# Patient Record
Sex: MALE | Race: WHITE | NOT HISPANIC OR LATINO | Employment: UNEMPLOYED | URBAN - METROPOLITAN AREA
[De-identification: names, ages, dates, MRNs, and addresses within clinical notes are randomized per-mention and may not be internally consistent; named-entity substitution may affect disease eponyms.]

---

## 2020-01-14 ENCOUNTER — HOSPITAL ENCOUNTER (INPATIENT)
Facility: HOSPITAL | Age: 30
LOS: 4 days | Discharge: HOME | End: 2020-01-18
Attending: EMERGENCY MEDICINE | Admitting: INTERNAL MEDICINE

## 2020-01-14 ENCOUNTER — APPOINTMENT (OUTPATIENT)
Dept: GENERAL RADIOLOGY | Facility: HOSPITAL | Age: 30
End: 2020-01-14

## 2020-01-14 ENCOUNTER — APPOINTMENT (OUTPATIENT)
Dept: CT IMAGING | Facility: HOSPITAL | Age: 30
End: 2020-01-14

## 2020-01-14 DIAGNOSIS — F10.10 ALCOHOL ABUSE: ICD-10-CM

## 2020-01-14 DIAGNOSIS — R11.2 NON-INTRACTABLE VOMITING WITH NAUSEA, UNSPECIFIED VOMITING TYPE: ICD-10-CM

## 2020-01-14 DIAGNOSIS — R79.89 ELEVATED LFTS: ICD-10-CM

## 2020-01-14 DIAGNOSIS — K85.20 ALCOHOL-INDUCED ACUTE PANCREATITIS WITHOUT INFECTION OR NECROSIS: Primary | ICD-10-CM

## 2020-01-14 DIAGNOSIS — E87.6 HYPOKALEMIA: ICD-10-CM

## 2020-01-14 PROBLEM — K85.90 PANCREATITIS: Status: ACTIVE | Noted: 2020-01-14

## 2020-01-14 LAB
ALBUMIN SERPL-MCNC: 4.5 G/DL (ref 3.5–5.2)
ALBUMIN/GLOB SERPL: 1.3 G/DL
ALP SERPL-CCNC: 124 U/L (ref 39–117)
ALT SERPL W P-5'-P-CCNC: 120 U/L (ref 1–41)
ANION GAP SERPL CALCULATED.3IONS-SCNC: 19 MMOL/L (ref 5–15)
AST SERPL-CCNC: 211 U/L (ref 1–40)
BASOPHILS # BLD AUTO: 0.06 10*3/MM3 (ref 0–0.2)
BASOPHILS NFR BLD AUTO: 0.7 % (ref 0–1.5)
BILIRUB SERPL-MCNC: 1 MG/DL (ref 0.2–1.2)
BILIRUB UR QL STRIP: NEGATIVE
BUN BLD-MCNC: 4 MG/DL (ref 6–20)
BUN/CREAT SERPL: 6.3 (ref 7–25)
CALCIUM SPEC-SCNC: 9.4 MG/DL (ref 8.6–10.5)
CHLORIDE SERPL-SCNC: 95 MMOL/L (ref 98–107)
CLARITY UR: CLEAR
CO2 SERPL-SCNC: 24 MMOL/L (ref 22–29)
COLOR UR: YELLOW
CREAT BLD-MCNC: 0.63 MG/DL (ref 0.76–1.27)
DEPRECATED RDW RBC AUTO: 43.3 FL (ref 37–54)
EOSINOPHIL # BLD AUTO: 0.13 10*3/MM3 (ref 0–0.4)
EOSINOPHIL NFR BLD AUTO: 1.4 % (ref 0.3–6.2)
ERYTHROCYTE [DISTWIDTH] IN BLOOD BY AUTOMATED COUNT: 12.1 % (ref 12.3–15.4)
GFR SERPL CREATININE-BSD FRML MDRD: 149 ML/MIN/1.73
GLOBULIN UR ELPH-MCNC: 3.4 GM/DL
GLUCOSE BLD-MCNC: 125 MG/DL (ref 65–99)
GLUCOSE UR STRIP-MCNC: NEGATIVE MG/DL
HCT VFR BLD AUTO: 48.4 % (ref 37.5–51)
HGB BLD-MCNC: 16.9 G/DL (ref 13–17.7)
HGB UR QL STRIP.AUTO: NEGATIVE
HOLD SPECIMEN: NORMAL
HOLD SPECIMEN: NORMAL
IMM GRANULOCYTES # BLD AUTO: 0.02 10*3/MM3 (ref 0–0.05)
IMM GRANULOCYTES NFR BLD AUTO: 0.2 % (ref 0–0.5)
KETONES UR QL STRIP: ABNORMAL
LEUKOCYTE ESTERASE UR QL STRIP.AUTO: NEGATIVE
LIPASE SERPL-CCNC: 785 U/L (ref 13–60)
LYMPHOCYTES # BLD AUTO: 1.93 10*3/MM3 (ref 0.7–3.1)
LYMPHOCYTES NFR BLD AUTO: 21.4 % (ref 19.6–45.3)
MCH RBC QN AUTO: 33.9 PG (ref 26.6–33)
MCHC RBC AUTO-ENTMCNC: 34.9 G/DL (ref 31.5–35.7)
MCV RBC AUTO: 97.2 FL (ref 79–97)
MONOCYTES # BLD AUTO: 1.21 10*3/MM3 (ref 0.1–0.9)
MONOCYTES NFR BLD AUTO: 13.4 % (ref 5–12)
NEUTROPHILS # BLD AUTO: 5.68 10*3/MM3 (ref 1.7–7)
NEUTROPHILS NFR BLD AUTO: 62.9 % (ref 42.7–76)
NITRITE UR QL STRIP: NEGATIVE
NRBC BLD AUTO-RTO: 0 /100 WBC (ref 0–0.2)
PH UR STRIP.AUTO: 6 [PH] (ref 5–8)
PLATELET # BLD AUTO: 125 10*3/MM3 (ref 140–450)
PMV BLD AUTO: 11.4 FL (ref 6–12)
POTASSIUM BLD-SCNC: 2.9 MMOL/L (ref 3.5–5.2)
PROT SERPL-MCNC: 7.9 G/DL (ref 6–8.5)
PROT UR QL STRIP: ABNORMAL
RBC # BLD AUTO: 4.98 10*6/MM3 (ref 4.14–5.8)
SODIUM BLD-SCNC: 138 MMOL/L (ref 136–145)
SP GR UR STRIP: 1.01 (ref 1–1.03)
UROBILINOGEN UR QL STRIP: ABNORMAL
WBC NRBC COR # BLD: 9.03 10*3/MM3 (ref 3.4–10.8)
WHOLE BLOOD HOLD SPECIMEN: NORMAL
WHOLE BLOOD HOLD SPECIMEN: NORMAL

## 2020-01-14 PROCEDURE — 81003 URINALYSIS AUTO W/O SCOPE: CPT | Performed by: EMERGENCY MEDICINE

## 2020-01-14 PROCEDURE — 84100 ASSAY OF PHOSPHORUS: CPT | Performed by: NURSE PRACTITIONER

## 2020-01-14 PROCEDURE — 83690 ASSAY OF LIPASE: CPT

## 2020-01-14 PROCEDURE — 25010000002 IOPAMIDOL 61 % SOLUTION: Performed by: EMERGENCY MEDICINE

## 2020-01-14 PROCEDURE — 80053 COMPREHEN METABOLIC PANEL: CPT

## 2020-01-14 PROCEDURE — 74177 CT ABD & PELVIS W/CONTRAST: CPT

## 2020-01-14 PROCEDURE — 25010000002 HYDROMORPHONE PER 4 MG: Performed by: EMERGENCY MEDICINE

## 2020-01-14 PROCEDURE — 80306 DRUG TEST PRSMV INSTRMNT: CPT | Performed by: NURSE PRACTITIONER

## 2020-01-14 PROCEDURE — 71045 X-RAY EXAM CHEST 1 VIEW: CPT

## 2020-01-14 PROCEDURE — 85025 COMPLETE CBC W/AUTO DIFF WBC: CPT

## 2020-01-14 PROCEDURE — 83735 ASSAY OF MAGNESIUM: CPT | Performed by: NURSE PRACTITIONER

## 2020-01-14 PROCEDURE — 80061 LIPID PANEL: CPT | Performed by: INTERNAL MEDICINE

## 2020-01-14 PROCEDURE — 99284 EMERGENCY DEPT VISIT MOD MDM: CPT

## 2020-01-14 PROCEDURE — 25010000003 POTASSIUM CHLORIDE 10 MEQ/100ML SOLUTION: Performed by: PHYSICIAN ASSISTANT

## 2020-01-14 PROCEDURE — 25010000002 ONDANSETRON PER 1 MG: Performed by: EMERGENCY MEDICINE

## 2020-01-14 RX ORDER — SODIUM CHLORIDE, SODIUM LACTATE, POTASSIUM CHLORIDE, CALCIUM CHLORIDE 600; 310; 30; 20 MG/100ML; MG/100ML; MG/100ML; MG/100ML
999 INJECTION, SOLUTION INTRAVENOUS CONTINUOUS
Status: ACTIVE | OUTPATIENT
Start: 2020-01-14 | End: 2020-01-15

## 2020-01-14 RX ORDER — ONDANSETRON 2 MG/ML
4 INJECTION INTRAMUSCULAR; INTRAVENOUS ONCE
Status: COMPLETED | OUTPATIENT
Start: 2020-01-14 | End: 2020-01-14

## 2020-01-14 RX ORDER — HYDROMORPHONE HYDROCHLORIDE 1 MG/ML
0.5 INJECTION, SOLUTION INTRAMUSCULAR; INTRAVENOUS; SUBCUTANEOUS ONCE
Status: COMPLETED | OUTPATIENT
Start: 2020-01-14 | End: 2020-01-14

## 2020-01-14 RX ORDER — SODIUM CHLORIDE 0.9 % (FLUSH) 0.9 %
10 SYRINGE (ML) INJECTION AS NEEDED
Status: DISCONTINUED | OUTPATIENT
Start: 2020-01-14 | End: 2020-01-18 | Stop reason: HOSPADM

## 2020-01-14 RX ORDER — SODIUM CHLORIDE, SODIUM LACTATE, POTASSIUM CHLORIDE, CALCIUM CHLORIDE 600; 310; 30; 20 MG/100ML; MG/100ML; MG/100ML; MG/100ML
250 INJECTION, SOLUTION INTRAVENOUS CONTINUOUS
Status: DISCONTINUED | OUTPATIENT
Start: 2020-01-14 | End: 2020-01-15

## 2020-01-14 RX ORDER — POTASSIUM CHLORIDE 7.45 MG/ML
10 INJECTION INTRAVENOUS
Status: DISPENSED | OUTPATIENT
Start: 2020-01-14 | End: 2020-01-15

## 2020-01-14 RX ADMIN — HYDROMORPHONE HYDROCHLORIDE 0.5 MG: 1 INJECTION, SOLUTION INTRAMUSCULAR; INTRAVENOUS; SUBCUTANEOUS at 22:55

## 2020-01-14 RX ADMIN — SODIUM CHLORIDE 1000 ML: 9 INJECTION, SOLUTION INTRAVENOUS at 22:52

## 2020-01-14 RX ADMIN — IOPAMIDOL 90 ML: 612 INJECTION, SOLUTION INTRAVENOUS at 23:12

## 2020-01-14 RX ADMIN — POTASSIUM CHLORIDE 10 MEQ: 7.46 INJECTION, SOLUTION INTRAVENOUS at 22:56

## 2020-01-14 RX ADMIN — ONDANSETRON 4 MG: 2 INJECTION INTRAMUSCULAR; INTRAVENOUS at 22:55

## 2020-01-15 PROBLEM — R79.89 ELEVATED LFTS: Status: ACTIVE | Noted: 2020-01-15

## 2020-01-15 PROBLEM — IMO0001 ALCOHOLISM /ALCOHOL ABUSE: Status: ACTIVE | Noted: 2020-01-15

## 2020-01-15 PROBLEM — E87.6 HYPOKALEMIA: Status: ACTIVE | Noted: 2020-01-15

## 2020-01-15 LAB
AMPHET+METHAMPHET UR QL: NEGATIVE
AMPHETAMINES UR QL: NEGATIVE
BARBITURATES UR QL SCN: NEGATIVE
BENZODIAZ UR QL SCN: POSITIVE
BUPRENORPHINE SERPL-MCNC: POSITIVE NG/ML
CANNABINOIDS SERPL QL: NEGATIVE
CHOLEST SERPL-MCNC: 156 MG/DL (ref 0–200)
COCAINE UR QL: NEGATIVE
HDLC SERPL-MCNC: 90 MG/DL (ref 40–60)
LDLC SERPL CALC-MCNC: 45 MG/DL (ref 0–100)
LDLC/HDLC SERPL: 0.5 {RATIO}
MAGNESIUM SERPL-MCNC: 1.8 MG/DL (ref 1.6–2.6)
MAGNESIUM SERPL-MCNC: 2.3 MG/DL (ref 1.6–2.6)
METHADONE UR QL SCN: NEGATIVE
OPIATES UR QL: NEGATIVE
OXYCODONE UR QL SCN: NEGATIVE
PCP UR QL SCN: NEGATIVE
PHOSPHATE SERPL-MCNC: 2.4 MG/DL (ref 2.5–4.5)
POTASSIUM BLD-SCNC: 3.5 MMOL/L (ref 3.5–5.2)
PROPOXYPH UR QL: NEGATIVE
TRICYCLICS UR QL SCN: NEGATIVE
TRIGL SERPL-MCNC: 107 MG/DL (ref 0–150)
TSH SERPL DL<=0.05 MIU/L-ACNC: 4.47 UIU/ML (ref 0.27–4.2)
VLDLC SERPL-MCNC: 21.4 MG/DL

## 2020-01-15 PROCEDURE — 25010000002 LORAZEPAM PER 2 MG: Performed by: NURSE PRACTITIONER

## 2020-01-15 PROCEDURE — 83735 ASSAY OF MAGNESIUM: CPT | Performed by: INTERNAL MEDICINE

## 2020-01-15 PROCEDURE — 99223 1ST HOSP IP/OBS HIGH 75: CPT | Performed by: INTERNAL MEDICINE

## 2020-01-15 PROCEDURE — 25010000002 HYDROMORPHONE PER 4 MG: Performed by: INTERNAL MEDICINE

## 2020-01-15 PROCEDURE — 25010000002 THIAMINE PER 100 MG: Performed by: NURSE PRACTITIONER

## 2020-01-15 PROCEDURE — 84443 ASSAY THYROID STIM HORMONE: CPT | Performed by: NURSE PRACTITIONER

## 2020-01-15 PROCEDURE — 25010000003 POTASSIUM CHLORIDE 10 MEQ/100ML SOLUTION: Performed by: PHYSICIAN ASSISTANT

## 2020-01-15 PROCEDURE — 25010000003 POTASSIUM CHLORIDE 10 MEQ/100ML SOLUTION: Performed by: NURSE PRACTITIONER

## 2020-01-15 PROCEDURE — 25010000002 ONDANSETRON PER 1 MG: Performed by: NURSE PRACTITIONER

## 2020-01-15 PROCEDURE — 84132 ASSAY OF SERUM POTASSIUM: CPT | Performed by: INTERNAL MEDICINE

## 2020-01-15 PROCEDURE — 25010000002 ENOXAPARIN PER 10 MG: Performed by: NURSE PRACTITIONER

## 2020-01-15 RX ORDER — ONDANSETRON 2 MG/ML
4 INJECTION INTRAMUSCULAR; INTRAVENOUS EVERY 6 HOURS PRN
Status: DISCONTINUED | OUTPATIENT
Start: 2020-01-15 | End: 2020-01-18 | Stop reason: HOSPADM

## 2020-01-15 RX ORDER — OXYCODONE AND ACETAMINOPHEN 7.5; 325 MG/1; MG/1
1 TABLET ORAL EVERY 4 HOURS PRN
Status: DISCONTINUED | OUTPATIENT
Start: 2020-01-15 | End: 2020-01-18 | Stop reason: HOSPADM

## 2020-01-15 RX ORDER — SODIUM CHLORIDE 0.9 % (FLUSH) 0.9 %
10 SYRINGE (ML) INJECTION EVERY 12 HOURS SCHEDULED
Status: DISCONTINUED | OUTPATIENT
Start: 2020-01-15 | End: 2020-01-18 | Stop reason: HOSPADM

## 2020-01-15 RX ORDER — MAGNESIUM SULFATE HEPTAHYDRATE 40 MG/ML
2 INJECTION, SOLUTION INTRAVENOUS AS NEEDED
Status: DISCONTINUED | OUTPATIENT
Start: 2020-01-15 | End: 2020-01-18 | Stop reason: HOSPADM

## 2020-01-15 RX ORDER — LORAZEPAM 2 MG/ML
2 INJECTION INTRAMUSCULAR
Status: DISCONTINUED | OUTPATIENT
Start: 2020-01-15 | End: 2020-01-16

## 2020-01-15 RX ORDER — SODIUM CHLORIDE 0.9 % (FLUSH) 0.9 %
10 SYRINGE (ML) INJECTION AS NEEDED
Status: DISCONTINUED | OUTPATIENT
Start: 2020-01-15 | End: 2020-01-18 | Stop reason: HOSPADM

## 2020-01-15 RX ORDER — LORAZEPAM 1 MG/1
1 TABLET ORAL
Status: DISCONTINUED | OUTPATIENT
Start: 2020-01-15 | End: 2020-01-16

## 2020-01-15 RX ORDER — HYDROMORPHONE HYDROCHLORIDE 1 MG/ML
0.5 INJECTION, SOLUTION INTRAMUSCULAR; INTRAVENOUS; SUBCUTANEOUS
Status: DISCONTINUED | OUTPATIENT
Start: 2020-01-15 | End: 2020-01-18 | Stop reason: HOSPADM

## 2020-01-15 RX ORDER — POTASSIUM CHLORIDE 1.5 G/1.77G
40 POWDER, FOR SOLUTION ORAL AS NEEDED
Status: DISCONTINUED | OUTPATIENT
Start: 2020-01-15 | End: 2020-01-18 | Stop reason: HOSPADM

## 2020-01-15 RX ORDER — MAGNESIUM SULFATE HEPTAHYDRATE 40 MG/ML
4 INJECTION, SOLUTION INTRAVENOUS AS NEEDED
Status: DISCONTINUED | OUTPATIENT
Start: 2020-01-15 | End: 2020-01-18 | Stop reason: HOSPADM

## 2020-01-15 RX ORDER — POTASSIUM CHLORIDE 7.45 MG/ML
10 INJECTION INTRAVENOUS
Status: DISCONTINUED | OUTPATIENT
Start: 2020-01-15 | End: 2020-01-18 | Stop reason: HOSPADM

## 2020-01-15 RX ORDER — LORAZEPAM 1 MG/1
2 TABLET ORAL
Status: DISCONTINUED | OUTPATIENT
Start: 2020-01-15 | End: 2020-01-16

## 2020-01-15 RX ORDER — BUPRENORPHINE HYDROCHLORIDE AND NALOXONE HYDROCHLORIDE DIHYDRATE 8; 2 MG/1; MG/1
1 TABLET SUBLINGUAL DAILY
COMMUNITY
End: 2020-01-18 | Stop reason: HOSPADM

## 2020-01-15 RX ORDER — BUSPIRONE HYDROCHLORIDE 10 MG/1
10 TABLET ORAL 2 TIMES DAILY
Status: DISCONTINUED | OUTPATIENT
Start: 2020-01-15 | End: 2020-01-18 | Stop reason: HOSPADM

## 2020-01-15 RX ORDER — SODIUM CHLORIDE 9 MG/ML
75 INJECTION, SOLUTION INTRAVENOUS CONTINUOUS
Status: DISCONTINUED | OUTPATIENT
Start: 2020-01-15 | End: 2020-01-18 | Stop reason: HOSPADM

## 2020-01-15 RX ORDER — ONDANSETRON 4 MG/1
4 TABLET, FILM COATED ORAL EVERY 6 HOURS PRN
Status: DISCONTINUED | OUTPATIENT
Start: 2020-01-15 | End: 2020-01-18 | Stop reason: HOSPADM

## 2020-01-15 RX ORDER — NICOTINE 21 MG/24HR
PATCH, TRANSDERMAL 24 HOURS TRANSDERMAL
Status: COMPLETED
Start: 2020-01-15 | End: 2020-01-15

## 2020-01-15 RX ORDER — NICOTINE 21 MG/24HR
1 PATCH, TRANSDERMAL 24 HOURS TRANSDERMAL
Status: DISCONTINUED | OUTPATIENT
Start: 2020-01-16 | End: 2020-01-15

## 2020-01-15 RX ORDER — NICOTINE 21 MG/24HR
1 PATCH, TRANSDERMAL 24 HOURS TRANSDERMAL
Status: DISCONTINUED | OUTPATIENT
Start: 2020-01-15 | End: 2020-01-18 | Stop reason: HOSPADM

## 2020-01-15 RX ORDER — POTASSIUM CHLORIDE 750 MG/1
40 CAPSULE, EXTENDED RELEASE ORAL AS NEEDED
Status: DISCONTINUED | OUTPATIENT
Start: 2020-01-15 | End: 2020-01-18 | Stop reason: HOSPADM

## 2020-01-15 RX ORDER — LORAZEPAM 2 MG/ML
1 INJECTION INTRAMUSCULAR
Status: DISCONTINUED | OUTPATIENT
Start: 2020-01-15 | End: 2020-01-16

## 2020-01-15 RX ADMIN — LORAZEPAM 2 MG: 1 TABLET ORAL at 19:41

## 2020-01-15 RX ADMIN — HYDROMORPHONE HYDROCHLORIDE 0.5 MG: 1 INJECTION, SOLUTION INTRAMUSCULAR; INTRAVENOUS; SUBCUTANEOUS at 19:36

## 2020-01-15 RX ADMIN — HYDROMORPHONE HYDROCHLORIDE 0.5 MG: 1 INJECTION, SOLUTION INTRAMUSCULAR; INTRAVENOUS; SUBCUTANEOUS at 16:05

## 2020-01-15 RX ADMIN — POTASSIUM CHLORIDE 10 MEQ: 7.46 INJECTION, SOLUTION INTRAVENOUS at 04:45

## 2020-01-15 RX ADMIN — LORAZEPAM 2 MG: 1 TABLET ORAL at 09:09

## 2020-01-15 RX ADMIN — ENOXAPARIN SODIUM 40 MG: 40 INJECTION SUBCUTANEOUS at 06:11

## 2020-01-15 RX ADMIN — ONDANSETRON 4 MG: 2 INJECTION INTRAMUSCULAR; INTRAVENOUS at 09:09

## 2020-01-15 RX ADMIN — MAGNESIUM SULFATE HEPTAHYDRATE 4 G: 40 INJECTION, SOLUTION INTRAVENOUS at 02:55

## 2020-01-15 RX ADMIN — LORAZEPAM 1 MG: 2 INJECTION INTRAMUSCULAR; INTRAVENOUS at 20:41

## 2020-01-15 RX ADMIN — SODIUM CHLORIDE, PRESERVATIVE FREE 10 ML: 5 INJECTION INTRAVENOUS at 08:17

## 2020-01-15 RX ADMIN — LORAZEPAM 1 MG: 2 INJECTION INTRAMUSCULAR; INTRAVENOUS at 04:52

## 2020-01-15 RX ADMIN — HYDROMORPHONE HYDROCHLORIDE 0.5 MG: 1 INJECTION, SOLUTION INTRAMUSCULAR; INTRAVENOUS; SUBCUTANEOUS at 09:09

## 2020-01-15 RX ADMIN — BUSPIRONE HYDROCHLORIDE 10 MG: 10 TABLET ORAL at 20:41

## 2020-01-15 RX ADMIN — POTASSIUM CHLORIDE 10 MEQ: 7.46 INJECTION, SOLUTION INTRAVENOUS at 07:00

## 2020-01-15 RX ADMIN — LORAZEPAM 1 MG: 2 INJECTION INTRAMUSCULAR; INTRAVENOUS at 02:44

## 2020-01-15 RX ADMIN — BUSPIRONE HYDROCHLORIDE 10 MG: 10 TABLET ORAL at 12:34

## 2020-01-15 RX ADMIN — POTASSIUM CHLORIDE 10 MEQ: 7.46 INJECTION, SOLUTION INTRAVENOUS at 05:54

## 2020-01-15 RX ADMIN — NICOTINE 1 PATCH: 21 PATCH, EXTENDED RELEASE TRANSDERMAL at 20:44

## 2020-01-15 RX ADMIN — SODIUM CHLORIDE, PRESERVATIVE FREE 10 ML: 5 INJECTION INTRAVENOUS at 02:55

## 2020-01-15 RX ADMIN — SODIUM CHLORIDE 150 ML/HR: 9 INJECTION, SOLUTION INTRAVENOUS at 02:55

## 2020-01-15 RX ADMIN — OXYCODONE HYDROCHLORIDE AND ACETAMINOPHEN 1 TABLET: 7.5; 325 TABLET ORAL at 12:33

## 2020-01-15 RX ADMIN — POTASSIUM CHLORIDE 10 MEQ: 7.46 INJECTION, SOLUTION INTRAVENOUS at 01:01

## 2020-01-15 RX ADMIN — HYDROMORPHONE HYDROCHLORIDE 0.5 MG: 1 INJECTION, SOLUTION INTRAMUSCULAR; INTRAVENOUS; SUBCUTANEOUS at 03:19

## 2020-01-15 RX ADMIN — SODIUM CHLORIDE, PRESERVATIVE FREE 10 ML: 5 INJECTION INTRAVENOUS at 20:41

## 2020-01-15 RX ADMIN — LORAZEPAM 2 MG: 2 INJECTION INTRAMUSCULAR; INTRAVENOUS at 08:17

## 2020-01-15 RX ADMIN — HYDROMORPHONE HYDROCHLORIDE 0.5 MG: 1 INJECTION, SOLUTION INTRAMUSCULAR; INTRAVENOUS; SUBCUTANEOUS at 06:10

## 2020-01-15 RX ADMIN — POTASSIUM CHLORIDE 10 MEQ: 7.46 INJECTION, SOLUTION INTRAVENOUS at 03:44

## 2020-01-15 RX ADMIN — LORAZEPAM 2 MG: 1 TABLET ORAL at 16:06

## 2020-01-15 RX ADMIN — NICOTINE: 21 PATCH, EXTENDED RELEASE TRANSDERMAL at 02:44

## 2020-01-15 RX ADMIN — POTASSIUM CHLORIDE 40 MEQ: 750 CAPSULE, EXTENDED RELEASE ORAL at 20:42

## 2020-01-15 RX ADMIN — ONDANSETRON 4 MG: 2 INJECTION INTRAMUSCULAR; INTRAVENOUS at 03:19

## 2020-01-15 RX ADMIN — THIAMINE HYDROCHLORIDE 100 ML/HR: 100 INJECTION, SOLUTION INTRAMUSCULAR; INTRAVENOUS at 08:17

## 2020-01-15 RX ADMIN — OXYCODONE HYDROCHLORIDE AND ACETAMINOPHEN 1 TABLET: 7.5; 325 TABLET ORAL at 17:11

## 2020-01-15 RX ADMIN — POTASSIUM CHLORIDE 40 MEQ: 750 CAPSULE, EXTENDED RELEASE ORAL at 17:11

## 2020-01-15 RX ADMIN — LORAZEPAM 2 MG: 2 INJECTION INTRAMUSCULAR; INTRAVENOUS at 10:53

## 2020-01-15 NOTE — PLAN OF CARE
Problem: Patient Care Overview  Goal: Plan of Care Review  Outcome: Ongoing (interventions implemented as appropriate)  Flowsheets (Taken 1/15/2020 0519)  Progress: no change  Plan of Care Reviewed With: patient; spouse  Outcome Summary: Pt newly admitted to floor tonight. Mg and potassium replaced per protocol. PRN medication given for pain. CIWAS at 8 and ativan given per protocol. Vital signs stable, will continue to monitor.     Problem: Pain, Chronic (Adult)  Goal: Identify Related Risk Factors and Signs and Symptoms  Outcome: Ongoing (interventions implemented as appropriate)  Flowsheets (Taken 1/15/2020 0519)  Related Risk Factors (Chronic Pain): pain control inadequate  Signs and Symptoms (Chronic Pain): verbalization of pain descriptors; verbalization of pain/discomfort for a prolonged time period     Problem: Pain, Chronic (Adult)  Goal: Acceptable Pain/Comfort Level and Functional Ability  Outcome: Ongoing (interventions implemented as appropriate)  Flowsheets (Taken 1/15/2020 0519)  Acceptable Pain/Comfort Level and Functional Ability: making progress toward outcome     Problem: Pancreatitis, Acute/Chronic (Adult)  Goal: Signs and Symptoms of Listed Potential Problems Will be Absent, Minimized or Managed (Pancreatitis, Acute/Chronic)  Outcome: Ongoing (interventions implemented as appropriate)  Flowsheets (Taken 1/15/2020 0519)  Problems Assessed (Pancreatitis): all  Problems Present (Pancreatitis): nausea and vomiting; pain     Problem: Alcohol Withdrawal Acute, Risk/Actual (Adult)  Goal: Signs and Symptoms of Listed Potential Problems Will be Absent, Minimized or Managed (Alcohol Withdrawal Acute, Risk/Actual)  Outcome: Ongoing (interventions implemented as appropriate)  Flowsheets (Taken 1/15/2020 0519)  Problems Assessed (Alcohol Withdrawal Syndrome): all  Problems Present (Alcohol W/D Syndrome): situational response

## 2020-01-15 NOTE — PROGRESS NOTES
Discharge Planning Assessment  [facility]     Patient Name: Eusebio Holley  MRN: [id number]  Today's Date: 1/15/2020    Admit Date: 1/14/2020    Discharge Needs Assessment     Row Name 01/15/20 1054       Living Environment    Lives With  spouse    Name(s) of Who Lives With Patient  Angélica Sher    Current Living Arrangements  home/apartment/condo    Primary Care Provided by  self    Provides Primary Care For  no one    Family Caregiver if Needed  spouse;parent(s)    Family Caregiver Names  Angélica Sher or mother    Quality of Family Relationships  involved mother    Able to Return to Prior Arrangements  yes possible rehab for chemical dependency       Resource/Environmental Concerns    Resource/Environmental Concerns  none       Transition Planning    Patient/Family Anticipates Transition to  home with family    Transportation Anticipated  family or friend will provide       Discharge Needs Assessment    Readmission Within the Last 30 Days  no previous admission in last 30 days    Concerns to be Addressed  no discharge needs identified;denies needs/concerns at this time    Equipment Currently Used at Home  none    Anticipated Changes Related to Illness  none    Equipment Needed After Discharge  none    Discharge Facility/Level of Care Needs  substance abuse facility    Provided post acute provider list?  Refused    Current Discharge Risk  substance use/abuse    Discharge Coordination/Progress  Home vs rehab for substance abuse        Discharge Plan     Row Name 01/15/20 1057       Plan    Plan  Home vs rehab for substance abuse    Patient/Family in Agreement with Plan  yes    Plan Comments  Spoke with patient at bedside regarding discharge planning, mother at bedside.  Patient denies use or need for HH or DME and reports that he has prescription coverage.  Patient reports he lives wiht he wife, Angélica, in a single level house with 3 steps to access and denies concerns regarding home safety.  Discussed with  "patient that the physican has placed an order for Chemical Dependency Team to see him and that they will be by today, patient mother verbalizes, \"HE NEEDS REHAB\".  No discharge needs verbalized at this time.  CM following.  Patient plan is either to discharge home or to rehab faciliyt for substance abuse.      Final Discharge Disposition Code  01 - home or self-care;65 - psychiatric hospital or unit    Row Name 01/15/20 0929       Plan    Plan  update    Plan Comments  Per CM consult, called Fabi, with Chemical Dependency Team to advise and left voicemail with request to see.  CM following.      Final Discharge Disposition Code  30 - still a patient        Destination      Coordination has not been started for this encounter.      Durable Medical Equipment      Coordination has not been started for this encounter.      Dialysis/Infusion      Coordination has not been started for this encounter.      Home Medical Care      Coordination has not been started for this encounter.      Therapy      Coordination has not been started for this encounter.      Community Resources      Coordination has not been started for this encounter.        Expected Discharge Date and Time     Expected Discharge Date Expected Discharge Time    Jan 17, 2020         Demographic Summary     Row Name 01/15/20 1041       General Information    Admission Type  inpatient    Arrived From  home    Referral Source  admission list    Reason for Consult  discharge planning    Preferred Language  English     Used During This Interaction  no    General Information Comments  Sam Watters MD       Contact Information    Permission Granted to Share Info With      Contact Information Obtained for      Contact Information Comments  Angélica Sher, spouse  [phone]        Functional Status     Row Name 01/15/20 1054       Functional Status    Usual Activity Tolerance  good    Current Activity Tolerance  moderate "       Functional Status, IADL    Medications  independent    Meal Preparation  independent    Housekeeping  independent    Laundry  independent    Shopping  independent       Employment/    Employment/ Comments  [place]        Psychosocial    No documentation.       Abuse/Neglect    No documentation.       Legal    No documentation.       Substance Abuse    No documentation.       Patient Forms    No documentation.           Alena Munoz RN

## 2020-01-15 NOTE — ED PROVIDER NOTES
Subjective    Eusebio Holley is a 29 y.o. male who presents to the ED with complaints of upper abdominal pain. Three days ago Mr. Holley began to experience abdominal pain, left chest pain, and middle back pain. He also endorses congestion, mild cough, decreased appetite, decreased bowel movements, nausea, and vomiting. He has vomited about 6 times today. He also notes burning and pressure with urination. He states he has had pancreatitis before and this pain feels similar. His last pancreatitis flare up was about 3 months ago. Mr. Holley has taken over the counter pain medications and Suboxone. He also gets mild relief from an ice pack on his abdomen. Mr. Holley has not had any past abdomen surgeries. He drinks about a pint of vodka every day and has decreased his alcohol intake after past ailments. He has no other acute complaints at this time.        History provided by:  Patient   used: No    Abdominal Pain   Pain location:  Generalized  Pain quality: burning and pressure    Pain radiates to:  Chest and back  Pain severity:  Moderate  Onset quality:  Sudden  Timing:  Constant  Chronicity:  New  Context: alcohol use and recent illness    Relieved by:  Cold and OTC medications  Associated symptoms: cough, dysuria, nausea and vomiting    Associated symptoms: no chills, no fever and no hematuria    Risk factors: alcohol abuse        Review of Systems   Constitutional: Positive for appetite change (Decreased). Negative for chills, diaphoresis and fever.   HENT: Positive for congestion and postnasal drip.    Respiratory: Positive for cough.    Gastrointestinal: Positive for abdominal pain (epigastric and LUQ), nausea and vomiting.   Genitourinary: Positive for dysuria. Negative for hematuria and urgency.   Psychiatric/Behavioral:        Daily ETOH use.   All other systems reviewed and are negative.      No past medical history on file.    Allergies   Allergen Reactions   • Penicillins Other (See  Comments)     Unknown; patient was a child       No past surgical history on file.    No family history on file.    Social History     Socioeconomic History   • Marital status:      Spouse name: Not on file   • Number of children: Not on file   • Years of education: Not on file   • Highest education level: Not on file         Objective   Physical Exam   Constitutional: He is oriented to person, place, and time. He appears well-developed and well-nourished.   Patient appears to be ill.   HENT:   Head: Normocephalic and atraumatic.   Nose: Nose normal.   Oral mucous membranes are dry.   Eyes: Conjunctivae are normal. No scleral icterus.   Neck: Normal range of motion. Neck supple.   Cardiovascular: Regular rhythm and normal heart sounds. Tachycardia present.   Mild tachycardia.   Pulmonary/Chest: Effort normal and breath sounds normal. No respiratory distress.   Abdominal: Soft. Bowel sounds are normal. There is tenderness. There is no CVA tenderness.   Moderate epigastric and left upper quadrant tenderness. Mild bilateral suprapubic tenderness. No flank or cva tenderness.   Musculoskeletal: Normal range of motion.   Neurological: He is alert and oriented to person, place, and time.   Skin: Skin is warm and dry.   Psychiatric: He has a normal mood and affect. His behavior is normal.   Nursing note and vitals reviewed.      Procedures         ED Course  ED Course as of Sanjiv 15 0008   Tue Jan 14, 2020 2130 Lipase(!): 785 [RS]   2130 ALT (SGPT)(!): 120 [RS]   2130 AST (SGOT)(!): 211 [RS]   2130 Alkaline Phosphatase(!): 124 [RS]   Wed Sanjiv 15, 2020   0001 CBC was within normal limits.  Chemistries reveal BUN and creatinine of 4 and 0.63.  Potassium is 2.9.  LFTs reveal , , and alk phos 124.  Total bilirubin is 1.0.  Lipase is 785.  CAT scan of the abdomen and pelvis with contrast reveals acute pancreatitis.  There is no drainable peripancreatic fluid collection.  Normal appendix.  Normal gallbladder.   Hepatic steatosis.  Patient is n.p.o. and we are giving aggressive IV fluids.  I ordered KCl 10 mEq per 100ml x3 runs.  I reevaluated the patient.  He is feeling much better and resting comfortably.  I paged the hospitalist to discuss admission.    [FC]   0002 I discussed the case with Dr. Prescott, hospitalist.  He is agreeable to admission on telemetry.  Patient exhibits no sign of alcohol withdrawal at this time.  Vital signs are stable.    [FC]      ED Course User Index  [FC] Kamille Ward PA-C  [RS] Berlin Hopson MD                                    Recent Results (from the past 24 hour(s))   Comprehensive Metabolic Panel    Collection Time: 01/14/20  8:34 PM   Result Value Ref Range    Glucose 125 (H) 65 - 99 mg/dL    BUN 4 (L) 6 - 20 mg/dL    Creatinine 0.63 (L) 0.76 - 1.27 mg/dL    Sodium 138 136 - 145 mmol/L    Potassium 2.9 (L) 3.5 - 5.2 mmol/L    Chloride 95 (L) 98 - 107 mmol/L    CO2 24.0 22.0 - 29.0 mmol/L    Calcium 9.4 8.6 - 10.5 mg/dL    Total Protein 7.9 6.0 - 8.5 g/dL    Albumin 4.50 3.50 - 5.20 g/dL    ALT (SGPT) 120 (H) 1 - 41 U/L    AST (SGOT) 211 (H) 1 - 40 U/L    Alkaline Phosphatase 124 (H) 39 - 117 U/L    Total Bilirubin 1.0 0.2 - 1.2 mg/dL    eGFR Non African Amer 149 >60 mL/min/1.73    Globulin 3.4 gm/dL    A/G Ratio 1.3 g/dL    BUN/Creatinine Ratio 6.3 (L) 7.0 - 25.0    Anion Gap 19.0 (H) 5.0 - 15.0 mmol/L   Lipase    Collection Time: 01/14/20  8:34 PM   Result Value Ref Range    Lipase 785 (H) 13 - 60 U/L   Light Blue Top    Collection Time: 01/14/20  8:34 PM   Result Value Ref Range    Extra Tube hold for add-on    Green Top (Gel)    Collection Time: 01/14/20  8:34 PM   Result Value Ref Range    Extra Tube Hold for add-ons.    Lavender Top    Collection Time: 01/14/20  8:34 PM   Result Value Ref Range    Extra Tube hold for add-on    Gold Top - SST    Collection Time: 01/14/20  8:34 PM   Result Value Ref Range    Extra Tube Hold for add-ons.    CBC Auto Differential     Collection Time: 01/14/20  8:34 PM   Result Value Ref Range    WBC 9.03 3.40 - 10.80 10*3/mm3    RBC 4.98 4.14 - 5.80 10*6/mm3    Hemoglobin 16.9 13.0 - 17.7 g/dL    Hematocrit 48.4 37.5 - 51.0 %    MCV 97.2 (H) 79.0 - 97.0 fL    MCH 33.9 (H) 26.6 - 33.0 pg    MCHC 34.9 31.5 - 35.7 g/dL    RDW 12.1 (L) 12.3 - 15.4 %    RDW-SD 43.3 37.0 - 54.0 fl    MPV 11.4 6.0 - 12.0 fL    Platelets 125 (L) 140 - 450 10*3/mm3    Neutrophil % 62.9 42.7 - 76.0 %    Lymphocyte % 21.4 19.6 - 45.3 %    Monocyte % 13.4 (H) 5.0 - 12.0 %    Eosinophil % 1.4 0.3 - 6.2 %    Basophil % 0.7 0.0 - 1.5 %    Immature Grans % 0.2 0.0 - 0.5 %    Neutrophils, Absolute 5.68 1.70 - 7.00 10*3/mm3    Lymphocytes, Absolute 1.93 0.70 - 3.10 10*3/mm3    Monocytes, Absolute 1.21 (H) 0.10 - 0.90 10*3/mm3    Eosinophils, Absolute 0.13 0.00 - 0.40 10*3/mm3    Basophils, Absolute 0.06 0.00 - 0.20 10*3/mm3    Immature Grans, Absolute 0.02 0.00 - 0.05 10*3/mm3    nRBC 0.0 0.0 - 0.2 /100 WBC   Urinalysis With Microscopic If Indicated (No Culture) - Urine, Clean Catch    Collection Time: 01/14/20  9:44 PM   Result Value Ref Range    Color, UA Yellow Yellow, Straw    Appearance, UA Clear Clear    pH, UA 6.0 5.0 - 8.0    Specific Gravity, UA 1.006 1.001 - 1.030    Glucose, UA Negative Negative    Ketones, UA Trace (A) Negative    Bilirubin, UA Negative Negative    Blood, UA Negative Negative    Protein, UA Trace (A) Negative    Leuk Esterase, UA Negative Negative    Nitrite, UA Negative Negative    Urobilinogen, UA 1.0 E.U./dL 0.2 - 1.0 E.U./dL     Note: In addition to lab results from this visit, the labs listed above may include labs taken at another facility or during a different encounter within the last 24 hours. Please correlate lab times with ED admission and discharge times for further clarification of the services performed during this visit.    CT Abdomen Pelvis With Contrast   Final Result      1. Acute pancreatitis. No drainable peripancreatic fluid  "collections.   2. Normal appendix.   3. Hepatic steatosis.   4. Small amount of free pelvic fluid.               Signer Name: Kurt Mandujano MD    Signed: 1/14/2020 11:30 PM    Workstation Name: [facility]     Radiology [facility]      XR Chest 1 View   Final Result   No acute cardiopulmonary findings.      Signer Name: Kurt Mandujano MD    Signed: 1/14/2020 10:40 PM    Workstation Name: [facility]        Vitals:    01/14/20 2018   BP: (!) 145/107   BP Location: Left arm   Patient Position: Sitting   Pulse: 105   Resp: 16   Temp: 98.3 °F (36.8 °C)   TempSrc: Oral   SpO2: 97%   Weight: 72.6 kg (160 lb)   Height: 177.8 cm (70\")     Medications   sodium chloride 0.9 % flush 10 mL (has no administration in time range)   potassium chloride 10 mEq in 100 mL IVPB (10 mEq Intravenous New Bag 1/14/20 2256)   lactated ringers infusion (has no administration in time range)   lactated ringers infusion (has no administration in time range)   thiamine (B-1) 100 mg in sodium chloride 0.9 % 100 mL IVPB (has no administration in time range)   sodium chloride 0.9 % bolus 1,000 mL (1,000 mL Intravenous New Bag 1/14/20 2252)   HYDROmorphone (DILAUDID) injection 0.5 mg (0.5 mg Intravenous Given 1/14/20 2255)   ondansetron (ZOFRAN) injection 4 mg (4 mg Intravenous Given 1/14/20 2255)   iopamidol (ISOVUE-300) 61 % injection 100 mL (90 mL Intravenous Given 1/14/20 1516)     ECG/EMG Results (last 24 hours)     ** No results found for the last 24 hours. **        No orders to display                   MDM    Final diagnoses:   Alcohol-induced acute pancreatitis without infection or necrosis   Hypokalemia   Non-intractable vomiting with nausea, unspecified vomiting type   Alcohol abuse   Elevated LFTs       Documentation assistance provided by deanne Galvan.  Information recorded by the scribe was done at my direction and has been verified and validated by me.     Cole, " Richard  01/14/20 2306       eJrry Gloria  01/14/20 2310       Kamille Wadr PA-C  01/15/20 0006       Kamille Ward PA-C  01/15/20 0008

## 2020-01-15 NOTE — H&P
[facility]  HISTORY AND PHYSICAL    Patient Name: Eusebio Holley  : 1990  MRN: [id number]  Primary Care Physician: Provider, No Known  Date of admission: 2020      Subjective   Subjective     Chief Complaint:  Abdominal Pain    HPI:  Eusebio Holley is a 29 y.o. male with a history of pancreatitis with the last flare up three months ago, alcohol abuse, and reports that he drinks a pint of vodka daily, present to the ED with complaints of upper abdominal pain with onset three days ago.  He reports left chest pain, middle back pain, loss of appetite, nausea, vomiting, diarrhea, and dysuria.  His abdominal pain is diffuse but worse in the left upper quadrant.  He denies fever, shortness of air, or any other complaints at this time.  He has taken over the counter pain medications, Suboxone, and ice packs to his abdomen for pain relief.  He was recently prescribed Suboxone but missed his appointment and has been buying them off the street for the past three weeks.  Reports that 2-3 months ago he went to inpatient rehab for his alcohol abuse.  Has went through withdrawal in the past but has never had a seizure.  CT of the abdomen pelvis shows acute pancreatitis, no drainable peripancreatic fluid collections.  Patient was bolused with IV fluids in the ED, and is being admitted to the Hospitalist for further evaluation and management.    Review of Systems   Constitutional: Positive for appetite change and fatigue. Negative for activity change, chills, diaphoresis and fever.   HENT: Negative.    Eyes: Negative.    Respiratory: Negative.    Cardiovascular: Positive for chest pain. Negative for palpitations and leg swelling.   Gastrointestinal: Positive for abdominal pain, diarrhea, nausea and vomiting. Negative for abdominal distention and blood in stool.   Endocrine: Negative.    Genitourinary: Positive for dysuria. Negative for difficulty urinating, frequency and  urgency.   Musculoskeletal: Positive for back pain. Negative for arthralgias, gait problem, joint swelling, neck pain and neck stiffness.   Skin: Negative.    Allergic/Immunologic: Negative.    Neurological: Positive for tremors (hands). Negative for dizziness, seizures, weakness, light-headedness, numbness and headaches.   Hematological: Negative.    Psychiatric/Behavioral: Negative.           Personal History     Past Medical History:   Diagnosis Date   • Hypertension        History reviewed. No pertinent surgical history.    Family History: family history is not on file. Otherwise pertinent FHx was reviewed and unremarkable.     Social History:  reports that he has been smoking cigarettes. He has been smoking about 1.00 pack per day. He has never used smokeless tobacco. He reports that he drinks alcohol. He reports that he has current or past drug history. Drugs: Benzodiazepines and Marijuana. Frequency: 1.00 time per week.  Social History     Social History Narrative   • Not on file       Medications:  Available home medication information reviewed.  Medications Prior to Admission   Medication Sig Dispense Refill Last Dose   • buprenorphine-naloxone (SUBOXONE) 8-2 MG per SL tablet Place 1 tablet under the tongue Daily.          Allergies   Allergen Reactions   • Penicillins Other (See Comments)     Unknown; patient was a child       Objective   Objective     Vital Signs:   Temp:  [98.3 °F (36.8 °C)-98.9 °F (37.2 °C)] 98.9 °F (37.2 °C)  Heart Rate:  [] 109  Resp:  [16-18] 18  BP: (127-145)/() 127/99        Physical Exam   Constitutional: He is oriented to person, place, and time. He appears well-developed. No distress.   HENT:   Head: Normocephalic.   Eyes: Pupils are equal, round, and reactive to light.   Neck: Normal range of motion. Neck supple.   Cardiovascular: Normal rate, regular rhythm, normal heart sounds and intact distal pulses. Exam reveals no gallop and no friction rub.   No murmur  heard.  Pulmonary/Chest: Effort normal and breath sounds normal. No stridor. No respiratory distress. He has no wheezes. He has no rales.   Abdominal: Soft. Bowel sounds are normal. He exhibits no distension and no mass. There is tenderness (diffuse but worse in the LUQ). There is no rebound and no guarding.   Musculoskeletal: Normal range of motion. He exhibits no edema, tenderness or deformity.   Neurological: He is alert and oriented to person, place, and time. No cranial nerve deficit.   Skin: Skin is warm and dry. No rash noted. He is not diaphoretic. No erythema. No pallor.   Psychiatric: He has a normal mood and affect. His behavior is normal. Judgment and thought content normal.          Results Reviewed:  I have personally reviewed current lab and radiology data.    Results from last 7 days   Lab Units 01/14/20 2034   WBC 10*3/mm3 9.03   HEMOGLOBIN g/dL 16.9   HEMATOCRIT % 48.4   PLATELETS 10*3/mm3 125*     Results from last 7 days   Lab Units 01/14/20 2034   SODIUM mmol/L 138   POTASSIUM mmol/L 2.9*   CHLORIDE mmol/L 95*   CO2 mmol/L 24.0   BUN mg/dL 4*   CREATININE mg/dL 0.63*   GLUCOSE mg/dL 125*   CALCIUM mg/dL 9.4   ALT (SGPT) U/L 120*   AST (SGOT) U/L 211*     Estimated Creatinine Clearance: 182.6 mL/min (A) (by C-G formula based on SCr of 0.63 mg/dL (L)).  Brief Urine Lab Results  (Last result in the past 365 days)      Color   Clarity   Blood   Leuk Est   Nitrite   Protein   CREAT   Urine HCG        01/14/20 2144 Yellow Clear Negative Negative Negative Trace             Imaging Results (Last 24 Hours)     Procedure Component Value Units Date/Time    CT Abdomen Pelvis With Contrast [[id number]] Collected:  01/14/20 2330     Updated:  01/14/20 2332    Narrative:       CT Abdomen Pelvis W    INDICATION:   29-year-old male with right lower quadrant abdominal pain for 2 to 3 days.    TECHNIQUE:   CT of the abdomen and pelvis with IV contrast. Coronal and sagittal reconstructions were obtained.   Radiation dose reduction techniques included automated exposure control or exposure modulation based on body size. Count of known CT and cardiac nuc med  studies performed in previous 12 months: 0.     COMPARISON:   None available.    FINDINGS:  Visualized lung bases are unremarkable.    Abdomen:   Diffuse fatty infiltration throughout the liver. The spleen is within normal limits. There is edema and inflammatory stranding surrounding the pancreas, consistent with acute pancreatitis. No peripancreatic fluid collections. The gallbladder is normal.  Both adrenal glands are normal. The kidneys are normal. Abdominal aorta normal in course and caliber. Small bowel is unremarkable without obstruction. Normal appendix. The colon is unremarkable. No free fluid or free air.    Pelvis:   The urinary bladder is unremarkable.  The prostate gland is unremarkable. Small amount of free pelvic fluid.    No acute osseous abnormality.        Impression:         1. Acute pancreatitis. No drainable peripancreatic fluid collections.  2. Normal appendix.  3. Hepatic steatosis.  4. Small amount of free pelvic fluid.          Signer Name: Kurt Mandujano MD   Signed: 1/14/2020 11:30 PM   Workstation Name: [facility]    Radiology [facility]    XR Chest 1 View [[id number]] Collected:  01/14/20 2240     Updated:  01/14/20 2243    Narrative:       CR Chest 1 Vw    INDICATION:   29-year-old male with mid chest pain over the last couple of months.     COMPARISON:    None available.    FINDINGS:  Single portable AP view(s) of the chest.  The heart and mediastinal contours are normal. The lungs are clear. No pneumothorax or pleural effusion.      Impression:       No acute cardiopulmonary findings.    Signer Name: Kurt Mandujano MD   Signed: 1/14/2020 10:40 PM   Workstation Name: [facility]    Radiology [facility]             Assessment/Plan   Assessment & Plan     Active Hospital Problems    Diagnosis POA   •  **Pancreatitis [K85.90] Yes   • Alcoholism /alcohol abuse (CMS/HCC) [F10.20] Yes   • Hypokalemia [E87.6] Yes   • Elevated LFTs [R94.5] Yes     Assessment and Plan:    Alcohol induced pancreatitis  Alcohol abuse  --received 1 liter of LR, and 1 liter of NS in the ED  --NS @ 150 ml/hr  --npo  --CIWA protocol  --Rally Pack x 3 days  --UDS  --prn ativan  --fall/seizure precautions  --consult case management  --bmp, tsh, in the am    Elevated LFT's  --check hepatitis panel    Hypokalemia  --sliding scale replacement  --check mg and po4 now  --bmp in the am    History of drug abuse  --recently treated at suboxone clinic, but missed appointment 3 weeks ago and has been buying it off the street.  --consult case management/addiction    Tobacco abuse  --nicotine patch  --smoking cessation education      DVT prophylaxis:  Lovenox    CODE STATUS:    Code Status and Medical Interventions:   Ordered at: 01/15/20 0117     Level Of Support Discussed With:    Patient     Code Status:    CPR     Medical Interventions (Level of Support Prior to Arrest):    Full       Admission Status:  I believe this patient meets INPATIENT status due to pancreatitis, alcohol withdrawal, alcohol dependency, polysubstance abuse.  I feel patient’s risk for adverse outcomes and need for care warrant INPATIENT evaluation and I predict the patient’s care encounter to likely last beyond 2 midnights.      Attending   Admission Attestation       I have seen and examined the patient, performing an independent face-to-face diagnostic evaluation with plan of care reviewed and developed with the advanced practice clinician (APC).      Brief Summary Statement:   Eusebio Holley is a 29 y.o. male with past medical history significant for alcoholism, polysubstance abuse, history of pancreatitis who drinks 1 pint of vodka daily with his last drink being approximately 24 hours to 36 hours ago who presents to the ER with complaints of abdominal pain with onset 3 days  ago.    Patient reports epigastric abdominal pain that radiates to his back, loss of appetite, nausea, vomiting, diarrhea, and dysuria.  Patient has been taking Suboxone for his abdominal pain.  He was previously prescribed this as an outpatient but missed 1 of his appointments and therefore has been buying it off the street.  He reports that he begins to have alcohol withdrawal after 24 to 48 hours of his last drink with tremors.  He denies ever having a seizure.  Pain rated 10 out of 10 in severity initially, now 4 out of 10 after Dilaudid administration    CT imaging in the ER noted to have evidence for pancreatitis.    Remainder of detailed HPI is as noted by APC and has been reviewed and/or edited by me for completeness.    Attending Physical Exam:  Constitutional: Awake, alert  Eyes: PERRLA, sclerae anicteric, no conjunctival injection  HENT: NCAT, mucous membranes moist  Neck: Supple, no thyromegaly, no lymphadenopathy, trachea midline  Respiratory: Clear to auscultation bilaterally, nonlabored respirations   Cardiovascular: Tachy, no murmurs, rubs, or gallops, palpable pedal pulses bilaterally  Gastrointestinal: Positive bowel sounds, soft, tenderness in the epigastric and left upper quadrant, nondistended  Musculoskeletal: No bilateral ankle edema, no clubbing or cyanosis to extremities  Psychiatric: Appropriate affect, cooperative  Neurologic: Oriented x 3, strength symmetric in all extremities, Cranial Nerves grossly intact to confrontation, speech clear  Skin: No rashes    Brief Assessment/Plan :  See detailed assessment and plan developed with APC which I have reviewed and/or edited for completeness.    Electronically signed by Reid Prescott DO, 01/15/20, 3:31 AM.              Electronically signed by URIAH Kaminski, 01/15/20, 12:55 AM.

## 2020-01-15 NOTE — PROGRESS NOTES
[facility]  ADMISSION FOLLOW-UP NOTE          Patient admitted after midnight, H&P by my partner performed earlier on today's date reviewed.  Interim findings, labs, and charting also reviewed.        The [facility] Problem List has been managed and updated to include any new diagnoses:  Active Hospital Problems    Diagnosis  POA   • **Pancreatitis [K85.90]  Yes   • Alcoholism /alcohol abuse (CMS/HCC) [F10.20]  Yes   • Hypokalemia [E87.6]  Yes   • Elevated LFTs [R94.5]  Yes      Resolved Hospital Problems   No resolved problems to display.         ADDITIONAL PLAN:  - detailed assessment and plan form admission reviewed  -Continue n.p.o. except water.  Possibly start clears tomorrow.  Continue IV fluid.  Continue monitoring of alcohol withdrawal and Ativan replacement as needed.  Electrolytes replaced.  Recheck laboratory studies tomorrow.  Need for sobriety discussed at length.  Patient is trying to decide if he is motivated to quit.  His family is encouraging him.      Electronically signed by Delon Smiley MD, 01/15/20, 11:05 AM.

## 2020-01-15 NOTE — PROGRESS NOTES
"Continued Stay Note  [facility]     Patient Name: Eusebio Holley  MRN: [id number]  Today's Date: 1/15/2020    Admit Date: 1/14/2020    Discharge Plan     Row Name 01/15/20 1543       Plan    Plan  Ongoing    Plan Comments Met with patient and patient's wife, Angélica at bedside.  Detailed substance history obtained.  ETOH last ingestion:  1 day prior to admission  ETOH level at admission: no ETOH level obtained  ETOH Hx:  Has been drinking since about a year and a half ago.  He also had an opiate use disorder in the past- he had been going to a Suboxone Clinic and missed his last appointment, he admits to buying it off the street since then.  He had previously been drinking a 5th of Vodka daily  ETOH current consumption:  Has weaned himself down to a pint of Vodka daily  AUDIT: 20= Severe level of AUD  CIWA:  11  Previous treatment: [facility] a few months ago for 5 days- he began drinking almost immediately after discharge  Longest length of sobriety: 1 year- \"I had a good job, things were going well for me\"  Support System: Mom, his wife Angélica- however, she is an admitted alcoholic as well and states that she has contributed to his drinking, she desires treatment as well  Potential Barriers: follow-through, and the fact that he and his wife are both alcoholic/addict.  They state that their current living environment contributes to increased drinking.    Insurance: [employer]  Recommendations: AUD treatment- 30 day is recommended secondary to AUDIT, CIWA, and previous treatment which was only a detox  Tentative Plan:  The patient appears to be agreeable to IOP (Intensive Outpatient Program) this would consist of group sessions 3 days a week for 3 hours at a time for 6-8 weeks.  The patient could be restarted on Suboxone therapy during that time as well.  Vivitrol would not be an option should he continue his Suboxone.  Therefore, if desired other pharmaceutical agents could be used for the AUD.  These meds " can also be prescribed at the IOP.  The patient and patient's wife are going to think it over.  [facility] is the program that is recommended for this patient.  There is a chance that the patient's wife can also enter treatment at the same IOP, she would just have to do opposite hours from the patient- i.e.- he does days, she does nights.  I discussed at length the health risks of continued ETOH consumption and the overall detrimental and deadly effects of AUD.    We will revisit the patient and attempt lock in a commitment for AUD treatment.  Thank you very much for this consult.        Discharge Codes    No documentation.       Expected Discharge Date and Time     Expected Discharge Date Expected Discharge Time    Jan 17, 2020             Fabi Valdez RN ,BSN   Addiction Coordinator

## 2020-01-15 NOTE — PROGRESS NOTES
"Continued Stay Note  [facility]     Patient Name: Eusebio Holley  MRN: [id number]  Today's Date: 1/15/2020    Admit Date: 1/14/2020    Discharge Plan     Row Name 01/15/20 1204       Plan    Plan  Ongoing    Plan Comments  Received consult on patient through case management- will be up to discuss options with him today.  Thank you very much for the consult.    Row Name 01/15/20 1057       Plan    Plan  Home vs rehab for substance abuse    Patient/Family in Agreement with Plan  yes    Plan Comments  Spoke with patient at bedside regarding discharge planning, mother at bedside.  Patient denies use or need for HH or DME and reports that he has prescription coverage.  Patient reports he lives wiht he wife, Angélica, in a single level house with 3 steps to access and denies concerns regarding home safety.  Discussed with patient that the physican has placed an order for Chemical Dependency Team to see him and that they will be by today, patient mother verbalizes, \"HE NEEDS REHAB\".  No discharge needs verbalized at this time.  CM following.  Patient plan is either to discharge home or to rehab faciliyt for substance abuse.      Final Discharge Disposition Code  01 - home or self-care;65 - psychiatric hospital or unit    Row Name 01/15/20 0929       Plan    Plan  update    Plan Comments  Per CM consult, called Fabi, with Chemical Dependency Team to advise and left voicemail with request to see.  CM following.      Final Discharge Disposition Code  30 - still a patient        Discharge Codes    No documentation.       Expected Discharge Date and Time     Expected Discharge Date Expected Discharge Time    Jan 17, 2020             Fabi Vadlez RN ,BSN   Addiction Coordinator     "

## 2020-01-15 NOTE — PROGRESS NOTES
Continued Stay Note  [facility]     Patient Name: Eusebio Holley  MRN: [id number]  Today's Date: 1/15/2020    Admit Date: 1/14/2020    Discharge Plan     Row Name 01/15/20 0929       Plan    Plan  update    Plan Comments  Per CM consult, called Fabi, with Chemical Dependency Team to advise and left voicemail with request to see.  CM following.      Final Discharge Disposition Code  30 - still a patient        Discharge Codes    No documentation.       Expected Discharge Date and Time     Expected Discharge Date Expected Discharge Time    Jan 17, 2020             Alena Munoz RN

## 2020-01-15 NOTE — PAYOR COMM NOTE
"Ref # [id number]  Jennifer Merino RN, BSN  Phone # [phone]  Fax # [phone]  Eusebio Morrissey (29 y.o. Male)     Date of Birth Social Security Number Address Home Phone MRN    1990  [address] [facility] [address] [phone] [id number]    Holiness Marital Status          Worship        Admission Date Admission Type Admitting Provider Attending Provider Department, Room/Bed    20 Emergency Delon Smiley MD Furlow, Stephen Matthew, MD [facility] 6B, N645/1    Discharge Date Discharge Disposition Discharge Destination                       Attending Provider:  Delon Smiley MD    Allergies:  Penicillins    Isolation:  None   Infection:  None   Code Status:  CPR    Ht:  175.3 cm (69\")   Wt:  76 kg (167 lb 9.6 oz)    Admission Cmt:  None   Principal Problem:  Pancreatitis [K85.90]                 Active Insurance as of 2020     Primary Coverage     Payor Plan Insurance Group Employer/Plan Group    ANTHEM MEDICAID [place] MEDICAID [id number]     Payor Plan Address Payor Plan Phone Number Payor Plan Fax Number Effective Dates    [address] [address] [phone]  2019 - None Entered    [facility] [id number]       Subscriber Name Subscriber Birth Date Member ID       EUSEBIO MORRISSEY 1990 [id number]                        History & Physical      Reid Prescott DO at 01/15/20 0055              [facility] Medicine Services  HISTORY AND PHYSICAL    Patient Name: Eusebio Morrissey  : 1990  MRN: [id number]  Primary Care Physician: Provider, No Known  Date of admission: 2020      Subjective   Subjective     Chief Complaint:  Abdominal Pain    HPI:  Eusebio Morrissey is a 29 y.o. male with a history of pancreatitis with the last flare up three months ago, alcohol abuse, and reports that he drinks a pint of vodka daily, present to the ED with complaints of upper abdominal pain with onset three days ago.  He " reports left chest pain, middle back pain, loss of appetite, nausea, vomiting, diarrhea, and dysuria.  His abdominal pain is diffuse but worse in the left upper quadrant.  He denies fever, shortness of air, or any other complaints at this time.  He has taken over the counter pain medications, Suboxone, and ice packs to his abdomen for pain relief.  He was recently prescribed Suboxone but missed his appointment and has been buying them off the street for the past three weeks.  Reports that 2-3 months ago he went to inpatient rehab for his alcohol abuse.  Has went through withdrawal in the past but has never had a seizure.  CT of the abdomen pelvis shows acute pancreatitis, no drainable peripancreatic fluid collections.  Patient was bolused with IV fluids in the ED, and is being admitted to the Hospitalist for further evaluation and management.    Review of Systems   Constitutional: Positive for appetite change and fatigue. Negative for activity change, chills, diaphoresis and fever.   HENT: Negative.    Eyes: Negative.    Respiratory: Negative.    Cardiovascular: Positive for chest pain. Negative for palpitations and leg swelling.   Gastrointestinal: Positive for abdominal pain, diarrhea, nausea and vomiting. Negative for abdominal distention and blood in stool.   Endocrine: Negative.    Genitourinary: Positive for dysuria. Negative for difficulty urinating, frequency and urgency.   Musculoskeletal: Positive for back pain. Negative for arthralgias, gait problem, joint swelling, neck pain and neck stiffness.   Skin: Negative.    Allergic/Immunologic: Negative.    Neurological: Positive for tremors (hands). Negative for dizziness, seizures, weakness, light-headedness, numbness and headaches.   Hematological: Negative.    Psychiatric/Behavioral: Negative.           Personal History     Past Medical History:   Diagnosis Date   • Hypertension        History reviewed. No pertinent surgical history.    Family History:  family history is not on file. Otherwise pertinent FHx was reviewed and unremarkable.     Social History:  reports that he has been smoking cigarettes. He has been smoking about 1.00 pack per day. He has never used smokeless tobacco. He reports that he drinks alcohol. He reports that he has current or past drug history. Drugs: Benzodiazepines and Marijuana. Frequency: 1.00 time per week.  Social History     Social History Narrative   • Not on file       Medications:  Available home medication information reviewed.  Medications Prior to Admission   Medication Sig Dispense Refill Last Dose   • buprenorphine-naloxone (SUBOXONE) 8-2 MG per SL tablet Place 1 tablet under the tongue Daily.          Allergies   Allergen Reactions   • Penicillins Other (See Comments)     Unknown; patient was a child       Objective   Objective     Vital Signs:   Temp:  [98.3 °F (36.8 °C)-98.9 °F (37.2 °C)] 98.9 °F (37.2 °C)  Heart Rate:  [] 109  Resp:  [16-18] 18  BP: (127-145)/() 127/99        Physical Exam   Constitutional: He is oriented to person, place, and time. He appears well-developed. No distress.   HENT:   Head: Normocephalic.   Eyes: Pupils are equal, round, and reactive to light.   Neck: Normal range of motion. Neck supple.   Cardiovascular: Normal rate, regular rhythm, normal heart sounds and intact distal pulses. Exam reveals no gallop and no friction rub.   No murmur heard.  Pulmonary/Chest: Effort normal and breath sounds normal. No stridor. No respiratory distress. He has no wheezes. He has no rales.   Abdominal: Soft. Bowel sounds are normal. He exhibits no distension and no mass. There is tenderness (diffuse but worse in the LUQ). There is no rebound and no guarding.   Musculoskeletal: Normal range of motion. He exhibits no edema, tenderness or deformity.   Neurological: He is alert and oriented to person, place, and time. No cranial nerve deficit.   Skin: Skin is warm and dry. No rash noted. He is not  diaphoretic. No erythema. No pallor.   Psychiatric: He has a normal mood and affect. His behavior is normal. Judgment and thought content normal.          Results Reviewed:  I have personally reviewed current lab and radiology data.    Results from last 7 days   Lab Units 01/14/20 2034   WBC 10*3/mm3 9.03   HEMOGLOBIN g/dL 16.9   HEMATOCRIT % 48.4   PLATELETS 10*3/mm3 125*     Results from last 7 days   Lab Units 01/14/20 2034   SODIUM mmol/L 138   POTASSIUM mmol/L 2.9*   CHLORIDE mmol/L 95*   CO2 mmol/L 24.0   BUN mg/dL 4*   CREATININE mg/dL 0.63*   GLUCOSE mg/dL 125*   CALCIUM mg/dL 9.4   ALT (SGPT) U/L 120*   AST (SGOT) U/L 211*     Estimated Creatinine Clearance: 182.6 mL/min (A) (by C-G formula based on SCr of 0.63 mg/dL (L)).  Brief Urine Lab Results  (Last result in the past 365 days)      Color   Clarity   Blood   Leuk Est   Nitrite   Protein   CREAT   Urine HCG        01/14/20 2144 Yellow Clear Negative Negative Negative Trace             Imaging Results (Last 24 Hours)     Procedure Component Value Units Date/Time    CT Abdomen Pelvis With Contrast [[id number]] Collected:  01/14/20 2330     Updated:  01/14/20 2332    Narrative:       CT Abdomen Pelvis W    INDICATION:   29-year-old male with right lower quadrant abdominal pain for 2 to 3 days.    TECHNIQUE:   CT of the abdomen and pelvis with IV contrast. Coronal and sagittal reconstructions were obtained.  Radiation dose reduction techniques included automated exposure control or exposure modulation based on body size. Count of known CT and cardiac nuc med  studies performed in previous 12 months: 0.     COMPARISON:   None available.    FINDINGS:  Visualized lung bases are unremarkable.    Abdomen:   Diffuse fatty infiltration throughout the liver. The spleen is within normal limits. There is edema and inflammatory stranding surrounding the pancreas, consistent with acute pancreatitis. No peripancreatic fluid collections. The gallbladder is normal.  Both  adrenal glands are normal. The kidneys are normal. Abdominal aorta normal in course and caliber. Small bowel is unremarkable without obstruction. Normal appendix. The colon is unremarkable. No free fluid or free air.    Pelvis:   The urinary bladder is unremarkable.  The prostate gland is unremarkable. Small amount of free pelvic fluid.    No acute osseous abnormality.        Impression:         1. Acute pancreatitis. No drainable peripancreatic fluid collections.  2. Normal appendix.  3. Hepatic steatosis.  4. Small amount of free pelvic fluid.          Signer Name: Kurt Mandujano MD   Signed: 1/14/2020 11:30 PM   Workstation Name: [email or web address]    [facility]    XR Chest 1 View [[id number]] Collected:  01/14/20 2240     Updated:  01/14/20 2243    Narrative:       CR Chest 1 Vw    INDICATION:   29-year-old male with mid chest pain over the last couple of months.     COMPARISON:    None available.    FINDINGS:  Single portable AP view(s) of the chest.  The heart and mediastinal contours are normal. The lungs are clear. No pneumothorax or pleural effusion.      Impression:       No acute cardiopulmonary findings.    Signer Name: Kurt Mandujano MD   Signed: 1/14/2020 10:40 PM   Workstation Name: [facility]    [facility]             Assessment/Plan   Assessment & Plan     Active Hospital Problems    Diagnosis POA   • **Pancreatitis [K85.90] Yes   • Alcoholism /alcohol abuse (CMS/HCC) [F10.20] Yes   • Hypokalemia [E87.6] Yes   • Elevated LFTs [R94.5] Yes     Assessment and Plan:    Alcohol induced pancreatitis  Alcohol abuse  --received 1 liter of LR, and 1 liter of NS in the ED  --NS @ 150 ml/hr  --npo  --CIWA protocol  --Rally Pack x 3 days  --UDS  --prn ativan  --fall/seizure precautions  --consult case management  --bmp, tsh, in the am    Elevated LFT's  --check hepatitis panel    Hypokalemia  --sliding scale replacement  --check mg and po4 now  --bmp in the  am    History of drug abuse  --recently treated at suboxone clinic, but missed appointment 3 weeks ago and has been buying it off the street.  --consult case management/addiction    Tobacco abuse  --nicotine patch  --smoking cessation education      DVT prophylaxis:  Lovenox    CODE STATUS:    Code Status and Medical Interventions:   Ordered at: 01/15/20 0117     Level Of Support Discussed With:    Patient     Code Status:    CPR     Medical Interventions (Level of Support Prior to Arrest):    Full       Admission Status:  I believe this patient meets INPATIENT status due to pancreatitis, alcohol withdrawal, alcohol dependency, polysubstance abuse.  I feel patient’s risk for adverse outcomes and need for care warrant INPATIENT evaluation and I predict the patient’s care encounter to likely last beyond 2 midnights.      Attending   Admission Attestation       I have seen and examined the patient, performing an independent face-to-face diagnostic evaluation with plan of care reviewed and developed with the advanced practice clinician (APC).      Brief Summary Statement:   Eusebio Holley is a 29 y.o. male with past medical history significant for alcoholism, polysubstance abuse, history of pancreatitis who drinks 1 pint of vodka daily with his last drink being approximately 24 hours to 36 hours ago who presents to the ER with complaints of abdominal pain with onset 3 days ago.    Patient reports epigastric abdominal pain that radiates to his back, loss of appetite, nausea, vomiting, diarrhea, and dysuria.  Patient has been taking Suboxone for his abdominal pain.  He was previously prescribed this as an outpatient but missed 1 of his appointments and therefore has been buying it off the street.  He reports that he begins to have alcohol withdrawal after 24 to 48 hours of his last drink with tremors.  He denies ever having a seizure.  Pain rated 10 out of 10 in severity initially, now 4 out of 10 after Dilaudid  administration    CT imaging in the ER noted to have evidence for pancreatitis.    Remainder of detailed HPI is as noted by APC and has been reviewed and/or edited by me for completeness.    Attending Physical Exam:  Constitutional: Awake, alert  Eyes: PERRLA, sclerae anicteric, no conjunctival injection  HENT: NCAT, mucous membranes moist  Neck: Supple, no thyromegaly, no lymphadenopathy, trachea midline  Respiratory: Clear to auscultation bilaterally, nonlabored respirations   Cardiovascular: Tachy, no murmurs, rubs, or gallops, palpable pedal pulses bilaterally  Gastrointestinal: Positive bowel sounds, soft, tenderness in the epigastric and left upper quadrant, nondistended  Musculoskeletal: No bilateral ankle edema, no clubbing or cyanosis to extremities  Psychiatric: Appropriate affect, cooperative  Neurologic: Oriented x 3, strength symmetric in all extremities, Cranial Nerves grossly intact to confrontation, speech clear  Skin: No rashes    Brief Assessment/Plan :  See detailed assessment and plan developed with APC which I have reviewed and/or edited for completeness.    Electronically signed by Reid Prescott DO, 01/15/20, 3:31 AM.              Electronically signed by URIAH Kaminski, 01/15/20, 12:55 AM.          Electronically signed by Reid Prescott DO at 01/15/20 0338          Emergency Department Notes      Kamille Ward PA-C at 01/14/20 2224     Attestation signed by Michele Wilkinson DO at 01/15/20 0700          For this patient encounter, I reviewed the NP or PA documentation, treatment plan, and medical decision making. Michele Wilkinson DO 1/15/2020 7:00 AM                  Subjective    Eusebio Holley is a 29 y.o. male who presents to the ED with complaints of upper abdominal pain. Three days ago Mr. Holley began to experience abdominal pain, left chest pain, and middle back pain. He also endorses congestion, mild cough, decreased appetite, decreased bowel movements, nausea, and vomiting. He  has vomited about 6 times today. He also notes burning and pressure with urination. He states he has had pancreatitis before and this pain feels similar. His last pancreatitis flare up was about 3 months ago. Mr. Holley has taken over the counter pain medications and Suboxone. He also gets mild relief from an ice pack on his abdomen. Mr. Holley has not had any past abdomen surgeries. He drinks about a pint of vodka every day and has decreased his alcohol intake after past ailments. He has no other acute complaints at this time.        History provided by:  Patient   used: No    Abdominal Pain   Pain location:  Generalized  Pain quality: burning and pressure    Pain radiates to:  Chest and back  Pain severity:  Moderate  Onset quality:  Sudden  Timing:  Constant  Chronicity:  New  Context: alcohol use and recent illness    Relieved by:  Cold and OTC medications  Associated symptoms: cough, dysuria, nausea and vomiting    Associated symptoms: no chills, no fever and no hematuria    Risk factors: alcohol abuse        Review of Systems   Constitutional: Positive for appetite change (Decreased). Negative for chills, diaphoresis and fever.   HENT: Positive for congestion and postnasal drip.    Respiratory: Positive for cough.    Gastrointestinal: Positive for abdominal pain (epigastric and LUQ), nausea and vomiting.   Genitourinary: Positive for dysuria. Negative for hematuria and urgency.   Psychiatric/Behavioral:        Daily ETOH use.   All other systems reviewed and are negative.      No past medical history on file.    Allergies   Allergen Reactions   • Penicillins Other (See Comments)     Unknown; patient was a child       No past surgical history on file.    No family history on file.    Social History     Socioeconomic History   • Marital status:      Spouse name: Not on file   • Number of children: Not on file   • Years of education: Not on file   • Highest education level: Not on  file         Objective   Physical Exam   Constitutional: He is oriented to person, place, and time. He appears well-developed and well-nourished.   Patient appears to be ill.   HENT:   Head: Normocephalic and atraumatic.   Nose: Nose normal.   Oral mucous membranes are dry.   Eyes: Conjunctivae are normal. No scleral icterus.   Neck: Normal range of motion. Neck supple.   Cardiovascular: Regular rhythm and normal heart sounds. Tachycardia present.   Mild tachycardia.   Pulmonary/Chest: Effort normal and breath sounds normal. No respiratory distress.   Abdominal: Soft. Bowel sounds are normal. There is tenderness. There is no CVA tenderness.   Moderate epigastric and left upper quadrant tenderness. Mild bilateral suprapubic tenderness. No flank or cva tenderness.   Musculoskeletal: Normal range of motion.   Neurological: He is alert and oriented to person, place, and time.   Skin: Skin is warm and dry.   Psychiatric: He has a normal mood and affect. His behavior is normal.   Nursing note and vitals reviewed.      Procedures        ED Course  ED Course as of Sanjiv 15 0008   Tue Jan 14, 2020 2130 Lipase(!): 785 [RS]   2130 ALT (SGPT)(!): 120 [RS]   2130 AST (SGOT)(!): 211 [RS]   2130 Alkaline Phosphatase(!): 124 [RS]   Wed Sanjiv 15, 2020   0001 CBC was within normal limits.  Chemistries reveal BUN and creatinine of 4 and 0.63.  Potassium is 2.9.  LFTs reveal , , and alk phos 124.  Total bilirubin is 1.0.  Lipase is 785.  CAT scan of the abdomen and pelvis with contrast reveals acute pancreatitis.  There is no drainable peripancreatic fluid collection.  Normal appendix.  Normal gallbladder.  Hepatic steatosis.  Patient is n.p.o. and we are giving aggressive IV fluids.  I ordered KCl 10 mEq per 100ml x3 runs.  I reevaluated the patient.  He is feeling much better and resting comfortably.  I paged the hospitalist to discuss admission.    [FC]   0002 I discussed the case with Dr. Prescott, hospitalist.  He is  agreeable to admission on telemetry.  Patient exhibits no sign of alcohol withdrawal at this time.  Vital signs are stable.    [FC]      ED Course User Index  [FC] Kamille Ward PA-C  [RS] Berlin Hopson MD                                    Recent Results (from the past 24 hour(s))   Comprehensive Metabolic Panel    Collection Time: 01/14/20  8:34 PM   Result Value Ref Range    Glucose 125 (H) 65 - 99 mg/dL    BUN 4 (L) 6 - 20 mg/dL    Creatinine 0.63 (L) 0.76 - 1.27 mg/dL    Sodium 138 136 - 145 mmol/L    Potassium 2.9 (L) 3.5 - 5.2 mmol/L    Chloride 95 (L) 98 - 107 mmol/L    CO2 24.0 22.0 - 29.0 mmol/L    Calcium 9.4 8.6 - 10.5 mg/dL    Total Protein 7.9 6.0 - 8.5 g/dL    Albumin 4.50 3.50 - 5.20 g/dL    ALT (SGPT) 120 (H) 1 - 41 U/L    AST (SGOT) 211 (H) 1 - 40 U/L    Alkaline Phosphatase 124 (H) 39 - 117 U/L    Total Bilirubin 1.0 0.2 - 1.2 mg/dL    eGFR Non African Amer 149 >60 mL/min/1.73    Globulin 3.4 gm/dL    A/G Ratio 1.3 g/dL    BUN/Creatinine Ratio 6.3 (L) 7.0 - 25.0    Anion Gap 19.0 (H) 5.0 - 15.0 mmol/L   Lipase    Collection Time: 01/14/20  8:34 PM   Result Value Ref Range    Lipase 785 (H) 13 - 60 U/L   Light Blue Top    Collection Time: 01/14/20  8:34 PM   Result Value Ref Range    Extra Tube hold for add-on    Green Top (Gel)    Collection Time: 01/14/20  8:34 PM   Result Value Ref Range    Extra Tube Hold for add-ons.    Lavender Top    Collection Time: 01/14/20  8:34 PM   Result Value Ref Range    Extra Tube hold for add-on    Gold Top - SST    Collection Time: 01/14/20  8:34 PM   Result Value Ref Range    Extra Tube Hold for add-ons.    CBC Auto Differential    Collection Time: 01/14/20  8:34 PM   Result Value Ref Range    WBC 9.03 3.40 - 10.80 10*3/mm3    RBC 4.98 4.14 - 5.80 10*6/mm3    Hemoglobin 16.9 13.0 - 17.7 g/dL    Hematocrit 48.4 37.5 - 51.0 %    MCV 97.2 (H) 79.0 - 97.0 fL    MCH 33.9 (H) 26.6 - 33.0 pg    MCHC 34.9 31.5 - 35.7 g/dL    RDW 12.1 (L) 12.3 - 15.4 %    RDW-SD  43.3 37.0 - 54.0 fl    MPV 11.4 6.0 - 12.0 fL    Platelets 125 (L) 140 - 450 10*3/mm3    Neutrophil % 62.9 42.7 - 76.0 %    Lymphocyte % 21.4 19.6 - 45.3 %    Monocyte % 13.4 (H) 5.0 - 12.0 %    Eosinophil % 1.4 0.3 - 6.2 %    Basophil % 0.7 0.0 - 1.5 %    Immature Grans % 0.2 0.0 - 0.5 %    Neutrophils, Absolute 5.68 1.70 - 7.00 10*3/mm3    Lymphocytes, Absolute 1.93 0.70 - 3.10 10*3/mm3    Monocytes, Absolute 1.21 (H) 0.10 - 0.90 10*3/mm3    Eosinophils, Absolute 0.13 0.00 - 0.40 10*3/mm3    Basophils, Absolute 0.06 0.00 - 0.20 10*3/mm3    Immature Grans, Absolute 0.02 0.00 - 0.05 10*3/mm3    nRBC 0.0 0.0 - 0.2 /100 WBC   Urinalysis With Microscopic If Indicated (No Culture) - Urine, Clean Catch    Collection Time: 01/14/20  9:44 PM   Result Value Ref Range    Color, UA Yellow Yellow, Straw    Appearance, UA Clear Clear    pH, UA 6.0 5.0 - 8.0    Specific Gravity, UA 1.006 1.001 - 1.030    Glucose, UA Negative Negative    Ketones, UA Trace (A) Negative    Bilirubin, UA Negative Negative    Blood, UA Negative Negative    Protein, UA Trace (A) Negative    Leuk Esterase, UA Negative Negative    Nitrite, UA Negative Negative    Urobilinogen, UA 1.0 E.U./dL 0.2 - 1.0 E.U./dL     Note: In addition to lab results from this visit, the labs listed above may include labs taken at another facility or during a different encounter within the last 24 hours. Please correlate lab times with ED admission and discharge times for further clarification of the services performed during this visit.    CT Abdomen Pelvis With Contrast   Final Result      1. Acute pancreatitis. No drainable peripancreatic fluid collections.   2. Normal appendix.   3. Hepatic steatosis.   4. Small amount of free pelvic fluid.               Signer Name: Kurt Mandujano MD    Signed: 1/14/2020 11:30 PM    Workstation Name: [email or web address]     [facility]      XR Chest 1 View   Final Result   No acute cardiopulmonary findings.      Signer  "Name: Kurt Mandujano MD    Signed: 1/14/2020 10:40 PM    Workstation Name: [email or web address]     [facility] [place]        Vitals:    01/14/20 2018   BP: (!) 145/107   BP Location: Left arm   Patient Position: Sitting   Pulse: 105   Resp: 16   Temp: 98.3 °F (36.8 °C)   TempSrc: Oral   SpO2: 97%   Weight: 72.6 kg (160 lb)   Height: 177.8 cm (70\")     Medications   sodium chloride 0.9 % flush 10 mL (has no administration in time range)   potassium chloride 10 mEq in 100 mL IVPB (10 mEq Intravenous New Bag 1/14/20 2256)   lactated ringers infusion (has no administration in time range)   lactated ringers infusion (has no administration in time range)   thiamine (B-1) 100 mg in sodium chloride 0.9 % 100 mL IVPB (has no administration in time range)   sodium chloride 0.9 % bolus 1,000 mL (1,000 mL Intravenous New Bag 1/14/20 2252)   HYDROmorphone (DILAUDID) injection 0.5 mg (0.5 mg Intravenous Given 1/14/20 2255)   ondansetron (ZOFRAN) injection 4 mg (4 mg Intravenous Given 1/14/20 2255)   iopamidol (ISOVUE-300) 61 % injection 100 mL (90 mL Intravenous Given 1/14/20 2312)     ECG/EMG Results (last 24 hours)     ** No results found for the last 24 hours. **        No orders to display                   MDM    Final diagnoses:   Alcohol-induced acute pancreatitis without infection or necrosis   Hypokalemia   Non-intractable vomiting with nausea, unspecified vomiting type   Alcohol abuse   Elevated LFTs       Documentation assistance provided by deanne Galvan.  Information recorded by the scribjaime was done at my direction and has been verified and validated by me.     Richard Galvan  01/14/20 3971       Jerry Gloria  01/14/20 2310       Kamille Ward PA-C  01/15/20 0006       Kamille Ward PA-C  01/15/20 0008      Electronically signed by Michele Wilkinson DO at 01/15/20 0700              Physician Progress Notes (last 72 hours) (Notes from 01/12/20 1145 through 01/15/20 1145)      Delon Smiley" MD Homer at 01/15/20 1105                [facility] Medicine Services  ADMISSION FOLLOW-UP NOTE          Patient admitted after midnight, H&P by my partner performed earlier on today's date reviewed.  Interim findings, labs, and charting also reviewed.        The [facility] Problem List has been managed and updated to include any new diagnoses:  Active Hospital Problems    Diagnosis  POA   • **Pancreatitis [K85.90]  Yes   • Alcoholism /alcohol abuse (CMS/HCC) [F10.20]  Yes   • Hypokalemia [E87.6]  Yes   • Elevated LFTs [R94.5]  Yes      Resolved Hospital Problems   No resolved problems to display.         ADDITIONAL PLAN:  - detailed assessment and plan form admission reviewed  -Continue n.p.o. except water.  Possibly start clears tomorrow.  Continue IV fluid.  Continue monitoring of alcohol withdrawal and Ativan replacement as needed.  Electrolytes replaced.  Recheck laboratory studies tomorrow.  Need for sobriety discussed at length.  Patient is trying to decide if he is motivated to quit.  His family is encouraging him.      Electronically signed by Delon Smiley MD, 01/15/20, 11:05 AM.      Electronically signed by Delon Smiley MD at 01/15/20 1106       Alena Munoz, RN      Case Management   Progress Notes   Signed   Date of Service:  01/15/20 0929   Creation Time:  01/15/20 0929            Signed             Show:Clear all  []Manual[x]Template[]Copied    Added by:  [x]Alena Munoz, RN    []Cierra for details  Continued Stay Note  [facility]     Patient Name: Eusebio Holley                     MRN: [id number]  Today's Date: 1/15/2020                     Admit Date: 1/14/2020          Discharge Plan      Row Name 01/15/20 0929           Plan     Plan  update     Plan Comments  Per CM consult, called Fabi, with Chemical Dependency Team to advise and left voicemail with request to see.  CM following.       Final Discharge Disposition Code  30  - still a patient          Discharge Codes    No documentation.              Expected Discharge Date and Time      Expected Discharge Date Expected Discharge Time     Jan 17, 2020                  Alena Munoz, Alena Mckeon, RN      Case Management   Progress Notes   Signed   Date of Service:  01/15/20 1102   Creation Time:  01/15/20 1102            Signed             Show:Clear all  []Manual[x]Template[]Copied    Added by:  [x]Alena Munoz, RN    []Cierra for details  Discharge Planning Assessment  [facility]     Patient Name: Eusebio Holley                     MRN: [id number]  Today's Date: 1/15/2020                     Admit Date: 1/14/2020          Discharge Needs Assessment      Row Name 01/15/20 1054           Living Environment     Lives With  spouse     Name(s) of Who Lives With Patient  Angélica Sher     Current Living Arrangements  home/apartment/condo     Primary Care Provided by  self     Provides Primary Care For  no one     Family Caregiver if Needed  spouse;parent(s)     Family Caregiver Names  Angélica Sher or mother     Quality of Family Relationships  involved mother     Able to Return to Prior Arrangements  yes possible rehab for chemical dependency           Resource/Environmental Concerns     Resource/Environmental Concerns  none           Transition Planning     Patient/Family Anticipates Transition to  home with family     Transportation Anticipated  family or friend will provide           Discharge Needs Assessment     Readmission Within the Last 30 Days  no previous admission in last 30 days     Concerns to be Addressed  no discharge needs identified;denies needs/concerns at this time     Equipment Currently Used at Home  none     Anticipated Changes Related to Illness  none     Equipment Needed After Discharge  none     Discharge Facility/Level of Care Needs  substance abuse facility     Provided post acute provider list?   "Refused     Current Discharge Risk  substance use/abuse     Discharge Coordination/Progress  Home vs rehab for substance abuse               Discharge Plan      Row Name 01/15/20 7107           Plan     Plan  Home vs rehab for substance abuse     Patient/Family in Agreement with Plan  yes     Plan Comments  Spoke with patient at bedside regarding discharge planning, mother at bedside.  Patient denies use or need for HH or DME and reports that he has prescription coverage.  Patient reports he lives wiht he wife, Angélica, in a single level house with 3 steps to access and denies concerns regarding home safety.  Discussed with patient that the physican has placed an order for Chemical Dependency Team to see him and that they will be by today, patient mother verbalizes, \"HE NEEDS REHAB\".  No discharge needs verbalized at this time.  CM following.  Patient plan is either to discharge home or to rehab faciliyt for substance abuse.       Final Discharge Disposition Code  01 - home or self-care;65 - psychiatric hospital or unit     Row Name 01/15/20 0929           Plan     Plan  update     Plan Comments  Per CM consult, called Fabi, with Chemical Dependency Team to advise and left voicemail with request to see.  CM following.       Final Discharge Disposition Code  30 - still a patient              Destination       Coordination has not been started for this encounter.               Durable Medical Equipment       Coordination has not been started for this encounter.           Dialysis/Infusion       Coordination has not been started for this encounter.               Home Medical Care       Coordination has not been started for this encounter.               Therapy       Coordination has not been started for this encounter.               Community Resources       Coordination has not been started for this encounter.               Expected Discharge Date and Time      Expected Discharge Date Expected Discharge Time     Sanjiv " 17, 2020                 Demographic Summary      Row Name 01/15/20 1041           General Information     Admission Type  inpatient     Arrived From  home     Referral Source  admission list     Reason for Consult  discharge planning     Preferred Language  English      Used During This Interaction  no     General Information Comments  Sam Watters MD           Contact Information     Permission Granted to Share Info With       Contact Information Obtained for       Contact Information Comments  Angélica Sher, spouse  [phone]               Functional Status      Row Name 01/15/20 1054           Functional Status     Usual Activity Tolerance  good     Current Activity Tolerance  moderate           Functional Status, IADL     Medications  independent     Meal Preparation  independent     Housekeeping  independent     Laundry  independent     Shopping  independent           Employment/     Employment/ Comments  [place] [employer]          Psychosocial    No documentation.         Abuse/Neglect    No documentation.         Legal    No documentation.         Substance Abuse    No documentation.         Patient Forms    No documentation.               Alena Munoz, RN                                     Study Result     CT Abdomen Pelvis W     INDICATION:   29-year-old male with right lower quadrant abdominal pain for 2 to 3 days.     TECHNIQUE:   CT of the abdomen and pelvis with IV contrast. Coronal and sagittal reconstructions were obtained.  Radiation dose reduction techniques included automated exposure control or exposure modulation based on body size. Count of known CT and cardiac nuc med  studies performed in previous 12 months: 0.      COMPARISON:   None available.     FINDINGS:  Visualized lung bases are unremarkable.     Abdomen:   Diffuse fatty infiltration throughout the liver. The spleen is within normal limits. There is edema and inflammatory  stranding surrounding the pancreas, consistent with acute pancreatitis. No peripancreatic fluid collections. The gallbladder is normal.  Both adrenal glands are normal. The kidneys are normal. Abdominal aorta normal in course and caliber. Small bowel is unremarkable without obstruction. Normal appendix. The colon is unremarkable. No free fluid or free air.     Pelvis:   The urinary bladder is unremarkable.  The prostate gland is unremarkable. Small amount of free pelvic fluid.     No acute osseous abnormality.        IMPRESSION:     1. Acute pancreatitis. No drainable peripancreatic fluid collections.  2. Normal appendix.  3. Hepatic steatosis.  4. Small amount of free pelvic fluid.              Signer Name: Kurt Mandujano MD   Signed: 1/14/2020 11:30 PM   Workstation Name: [email or web address]

## 2020-01-16 PROBLEM — I10 ESSENTIAL HYPERTENSION: Status: ACTIVE | Noted: 2020-01-16

## 2020-01-16 PROBLEM — F10.239 ALCOHOL DEPENDENCE WITH WITHDRAWAL (HCC): Status: ACTIVE | Noted: 2020-01-15

## 2020-01-16 PROBLEM — F11.10 NARCOTIC ABUSE (HCC): Status: ACTIVE | Noted: 2020-01-16

## 2020-01-16 LAB
ALBUMIN SERPL-MCNC: 3.7 G/DL (ref 3.5–5.2)
ALBUMIN/GLOB SERPL: 1.4 G/DL
ALP SERPL-CCNC: 101 U/L (ref 39–117)
ALT SERPL W P-5'-P-CCNC: 80 U/L (ref 1–41)
ANION GAP SERPL CALCULATED.3IONS-SCNC: 13 MMOL/L (ref 5–15)
AST SERPL-CCNC: 135 U/L (ref 1–40)
BILIRUB SERPL-MCNC: 1 MG/DL (ref 0.2–1.2)
BUN BLD-MCNC: 3 MG/DL (ref 6–20)
BUN/CREAT SERPL: 5.7 (ref 7–25)
CALCIUM SPEC-SCNC: 8.7 MG/DL (ref 8.6–10.5)
CHLORIDE SERPL-SCNC: 103 MMOL/L (ref 98–107)
CO2 SERPL-SCNC: 23 MMOL/L (ref 22–29)
CREAT BLD-MCNC: 0.53 MG/DL (ref 0.76–1.27)
DEPRECATED RDW RBC AUTO: 46 FL (ref 37–54)
ERYTHROCYTE [DISTWIDTH] IN BLOOD BY AUTOMATED COUNT: 12.3 % (ref 12.3–15.4)
GFR SERPL CREATININE-BSD FRML MDRD: >150 ML/MIN/1.73
GLOBULIN UR ELPH-MCNC: 2.6 GM/DL
GLUCOSE BLD-MCNC: 77 MG/DL (ref 65–99)
HCT VFR BLD AUTO: 40.7 % (ref 37.5–51)
HGB BLD-MCNC: 13.5 G/DL (ref 13–17.7)
MCH RBC QN AUTO: 33.8 PG (ref 26.6–33)
MCHC RBC AUTO-ENTMCNC: 33.2 G/DL (ref 31.5–35.7)
MCV RBC AUTO: 102 FL (ref 79–97)
PLATELET # BLD AUTO: 102 10*3/MM3 (ref 140–450)
PMV BLD AUTO: 12.2 FL (ref 6–12)
POTASSIUM BLD-SCNC: 4.2 MMOL/L (ref 3.5–5.2)
PROT SERPL-MCNC: 6.3 G/DL (ref 6–8.5)
RBC # BLD AUTO: 3.99 10*6/MM3 (ref 4.14–5.8)
SODIUM BLD-SCNC: 139 MMOL/L (ref 136–145)
WBC NRBC COR # BLD: 5.79 10*3/MM3 (ref 3.4–10.8)

## 2020-01-16 PROCEDURE — 25010000002 ENOXAPARIN PER 10 MG: Performed by: NURSE PRACTITIONER

## 2020-01-16 PROCEDURE — 25010000002 LORAZEPAM PER 2 MG: Performed by: NURSE PRACTITIONER

## 2020-01-16 PROCEDURE — 25010000002 THIAMINE PER 100 MG: Performed by: NURSE PRACTITIONER

## 2020-01-16 PROCEDURE — 85027 COMPLETE CBC AUTOMATED: CPT | Performed by: INTERNAL MEDICINE

## 2020-01-16 PROCEDURE — 99233 SBSQ HOSP IP/OBS HIGH 50: CPT | Performed by: INTERNAL MEDICINE

## 2020-01-16 PROCEDURE — 25010000002 ONDANSETRON PER 1 MG: Performed by: NURSE PRACTITIONER

## 2020-01-16 PROCEDURE — 80053 COMPREHEN METABOLIC PANEL: CPT | Performed by: INTERNAL MEDICINE

## 2020-01-16 RX ORDER — THIAMINE MONONITRATE (VIT B1) 100 MG
200 TABLET ORAL DAILY
Status: DISCONTINUED | OUTPATIENT
Start: 2020-01-16 | End: 2020-01-18 | Stop reason: HOSPADM

## 2020-01-16 RX ORDER — SUMATRIPTAN 50 MG/1
50 TABLET, FILM COATED ORAL
COMMUNITY
End: 2021-03-07

## 2020-01-16 RX ORDER — LORAZEPAM 0.5 MG/1
0.5 TABLET ORAL
Status: DISCONTINUED | OUTPATIENT
Start: 2020-01-16 | End: 2020-01-18 | Stop reason: HOSPADM

## 2020-01-16 RX ORDER — LORAZEPAM 1 MG/1
1 TABLET ORAL
Status: DISCONTINUED | OUTPATIENT
Start: 2020-01-16 | End: 2020-01-17

## 2020-01-16 RX ORDER — LORAZEPAM 2 MG/ML
0.5 INJECTION INTRAMUSCULAR
Status: DISCONTINUED | OUTPATIENT
Start: 2020-01-16 | End: 2020-01-18 | Stop reason: HOSPADM

## 2020-01-16 RX ORDER — LORAZEPAM 2 MG/ML
1 INJECTION INTRAMUSCULAR
Status: DISCONTINUED | OUTPATIENT
Start: 2020-01-16 | End: 2020-01-17

## 2020-01-16 RX ORDER — AMLODIPINE BESYLATE 5 MG/1
5 TABLET ORAL
Status: DISCONTINUED | OUTPATIENT
Start: 2020-01-16 | End: 2020-01-16

## 2020-01-16 RX ORDER — LABETALOL HYDROCHLORIDE 5 MG/ML
10 INJECTION, SOLUTION INTRAVENOUS
Status: DISCONTINUED | OUTPATIENT
Start: 2020-01-16 | End: 2020-01-18 | Stop reason: HOSPADM

## 2020-01-16 RX ORDER — CHOLECALCIFEROL (VITAMIN D3) 125 MCG
5 CAPSULE ORAL NIGHTLY
Status: DISCONTINUED | OUTPATIENT
Start: 2020-01-16 | End: 2020-01-18 | Stop reason: HOSPADM

## 2020-01-16 RX ORDER — SUMATRIPTAN 50 MG/1
50 TABLET, FILM COATED ORAL
Status: DISCONTINUED | OUTPATIENT
Start: 2020-01-16 | End: 2020-01-18 | Stop reason: HOSPADM

## 2020-01-16 RX ADMIN — SODIUM CHLORIDE 100 ML/HR: 9 INJECTION, SOLUTION INTRAVENOUS at 14:05

## 2020-01-16 RX ADMIN — MELATONIN TAB 5 MG 5 MG: 5 TAB at 19:43

## 2020-01-16 RX ADMIN — OXYCODONE HYDROCHLORIDE AND ACETAMINOPHEN 1 TABLET: 7.5; 325 TABLET ORAL at 08:27

## 2020-01-16 RX ADMIN — ENOXAPARIN SODIUM 40 MG: 40 INJECTION SUBCUTANEOUS at 07:38

## 2020-01-16 RX ADMIN — BUSPIRONE HYDROCHLORIDE 10 MG: 10 TABLET ORAL at 19:44

## 2020-01-16 RX ADMIN — NICOTINE 1 PATCH: 21 PATCH, EXTENDED RELEASE TRANSDERMAL at 08:27

## 2020-01-16 RX ADMIN — Medication 200 MG: at 13:30

## 2020-01-16 RX ADMIN — OXYCODONE HYDROCHLORIDE AND ACETAMINOPHEN 1 TABLET: 7.5; 325 TABLET ORAL at 19:43

## 2020-01-16 RX ADMIN — SODIUM CHLORIDE 150 ML/HR: 9 INJECTION, SOLUTION INTRAVENOUS at 01:12

## 2020-01-16 RX ADMIN — LORAZEPAM 2 MG: 2 INJECTION INTRAMUSCULAR; INTRAVENOUS at 08:26

## 2020-01-16 RX ADMIN — SODIUM CHLORIDE, PRESERVATIVE FREE 10 ML: 5 INJECTION INTRAVENOUS at 19:44

## 2020-01-16 RX ADMIN — SODIUM CHLORIDE 150 ML/HR: 9 INJECTION, SOLUTION INTRAVENOUS at 08:04

## 2020-01-16 RX ADMIN — THIAMINE HYDROCHLORIDE 100 ML/HR: 100 INJECTION, SOLUTION INTRAMUSCULAR; INTRAVENOUS at 08:26

## 2020-01-16 RX ADMIN — OXYCODONE HYDROCHLORIDE AND ACETAMINOPHEN 1 TABLET: 7.5; 325 TABLET ORAL at 01:26

## 2020-01-16 RX ADMIN — OXYCODONE HYDROCHLORIDE AND ACETAMINOPHEN 1 TABLET: 7.5; 325 TABLET ORAL at 14:18

## 2020-01-16 RX ADMIN — ONDANSETRON 4 MG: 2 INJECTION INTRAMUSCULAR; INTRAVENOUS at 01:27

## 2020-01-16 RX ADMIN — SODIUM CHLORIDE, PRESERVATIVE FREE 10 ML: 5 INJECTION INTRAVENOUS at 08:31

## 2020-01-16 RX ADMIN — BUSPIRONE HYDROCHLORIDE 10 MG: 10 TABLET ORAL at 08:27

## 2020-01-16 NOTE — PLAN OF CARE
Problem: Patient Care Overview  Goal: Plan of Care Review  Outcome: Ongoing (interventions implemented as appropriate)  Flowsheets (Taken 1/15/2020 1800)  Plan of Care Reviewed With: patient  Note:   Pt a/o, BP  elevated, MD notified. Tachycardic, RA. C/o pain addressed with PRN medication. Continue monitoring electrolytes. CIWA assessment ranging from 8-16, Ativan given per protocol. Continue monitoring.

## 2020-01-16 NOTE — PLAN OF CARE
Problem: Patient Care Overview  Goal: Plan of Care Review  Outcome: Ongoing (interventions implemented as appropriate)  Flowsheets (Taken 1/16/2020 0341)  Progress: no change  Plan of Care Reviewed With: patient; spouse; family  Outcome Summary: Pt extremely anxious and upset at beginning of shift and telling nurse that he is thinking of leaving AMA. Charge RN talked to pt and told her he was just very anxious and uncomfortable. CIWA at 11. IV pain mediation and ativan tablet administerd. Pt appeared anxious and with tremors and diastolic bp elevated in the one teens about an hour later. CIWA at 12. IV ativan administered and after pt appeared to rest well and bp decreased. PO pain tablet given for headache and zofran given for nausea. Will continue to monitor.     Problem: Pain, Chronic (Adult)  Goal: Identify Related Risk Factors and Signs and Symptoms  Outcome: Ongoing (interventions implemented as appropriate)  Flowsheets (Taken 1/16/2020 0341)  Related Risk Factors (Chronic Pain): pain control inadequate; coping ineffective; psychosocial factor  Signs and Symptoms (Chronic Pain): verbalization of pain/discomfort for a prolonged time period; verbalization of pain descriptors; guarding behavior/splinting/limp     Problem: Pain, Chronic (Adult)  Goal: Acceptable Pain/Comfort Level and Functional Ability  Outcome: Ongoing (interventions implemented as appropriate)  Flowsheets (Taken 1/16/2020 0341)  Acceptable Pain/Comfort Level and Functional Ability: making progress toward outcome     Problem: Pancreatitis, Acute/Chronic (Adult)  Goal: Signs and Symptoms of Listed Potential Problems Will be Absent, Minimized or Managed (Pancreatitis, Acute/Chronic)  Outcome: Ongoing (interventions implemented as appropriate)  Flowsheets (Taken 1/16/2020 0341)  Problems Assessed (Pancreatitis): all  Problems Present (Pancreatitis): fluid imbalance; pain; nausea and vomiting     Problem: Alcohol Withdrawal Acute, Risk/Actual  (Adult)  Goal: Signs and Symptoms of Listed Potential Problems Will be Absent, Minimized or Managed (Alcohol Withdrawal Acute, Risk/Actual)  Outcome: Ongoing (interventions implemented as appropriate)  Flowsheets (Taken 1/16/2020 0341)  Problems Assessed (Alcohol Withdrawal Syndrome): all  Problems Present (Alcohol W/D Syndrome): effects of CHINMAY (alcohol withdrawal syndrome)     Problem: Fall Risk (Adult)  Goal: Identify Related Risk Factors and Signs and Symptoms  Outcome: Ongoing (interventions implemented as appropriate)  Flowsheets (Taken 1/16/2020 0341)  Related Risk Factors (Fall Risk): depression/anxiety; gait/mobility problems; environment unfamiliar; polypharmacy  Signs and Symptoms (Fall Risk): presence of risk factors     Problem: Fall Risk (Adult)  Goal: Absence of Fall  Outcome: Ongoing (interventions implemented as appropriate)  Flowsheets (Taken 1/16/2020 0341)  Absence of Fall: making progress toward outcome

## 2020-01-16 NOTE — PROGRESS NOTES
[facility] Medicine Services  PROGRESS NOTE    Patient Name: Eusebio Holley  : 1990  MRN: [id number]    Date of Admission: 2020  Primary Care Physician: Alexis Watters MD    Subjective   Subjective     CC:  Abdominal pain and alcohol withdrawal    HPI:  Patient states abdominal pain continues but is substantially improved since yesterday.  He also feels his alcohol withdrawal is greatly improved.  He says he wants to take the Ativan just for anxiety and I have explained that it is primarily for alcohol withdrawal.  He is willing to try BuSpar for anxiety.  He reports being able to tolerate some Jell-O this morning.  His family is at the bedside providing supportive care.  He denies any confusion.  No other new complaints.  He reports intermittent tremors from his alcohol withdrawal.    Review of Systems  No current fevers or chills  No current shortness of breath or cough  No current dysuria or hematuria  No current chest pain or palpitations      Objective   Objective     Vital Signs:   Temp:  [97.9 °F (36.6 °C)-98.5 °F (36.9 °C)] 98.2 °F (36.8 °C)  Heart Rate:  [] 90  Resp:  [18-20] 18  BP: (130-152)/() 145/117        Physical Exam:  Constitutional:Awake, alert  HENT: NCAT, mucous membranes moist, neck supple  Respiratory: Clear to auscultation bilaterally, respiratory effort normal, nonlabored breathing   Cardiovascular: RRR, S1, S2, normal radial pulses  Gastrointestinal: Positive bowel sounds, soft, upper abdominal tenderness without guarding, nondistended  Musculoskeletal: Normal musculature for age, no lower extremity edema, BMI 24  Psychiatric: Appropriate affect, cooperative, conversational  Neurologic: No slurred speech or facial droop, follows commands, oriented  Skin: No rashes or jaundice, warm      Results Reviewed:  Results from last 7 days   Lab Units 20  0110 20   WBC 10*3/mm3 5.79 9.03   HEMOGLOBIN g/dL 13.5 16.9    HEMATOCRIT % 40.7 48.4   PLATELETS 10*3/mm3 102* 125*     Results from last 7 days   Lab Units 01/16/20  0110 01/15/20  1258 01/14/20  2034   SODIUM mmol/L 139  --  138   POTASSIUM mmol/L 4.2 3.5 2.9*   CHLORIDE mmol/L 103  --  95*   CO2 mmol/L 23.0  --  24.0   BUN mg/dL 3*  --  4*   CREATININE mg/dL 0.53*  --  0.63*   GLUCOSE mg/dL 77  --  125*   CALCIUM mg/dL 8.7  --  9.4   ALT (SGPT) U/L 80*  --  120*   AST (SGOT) U/L 135*  --  211*     Estimated Creatinine Clearance: 217.1 mL/min (A) (by C-G formula based on SCr of 0.53 mg/dL (L)).    Microbiology Results Abnormal     None          Imaging Results (Last 24 Hours)     ** No results found for the last 24 hours. **               I have reviewed the medications:  Scheduled Meds:  amLODIPine 5 mg Oral Q24H   busPIRone 10 mg Oral BID   enoxaparin 40 mg Subcutaneous Q24H   melatonin 5 mg Oral Nightly   IV Fluids 1000 mL + additives 100 mL/hr Intravenous Daily   nicotine 1 patch Transdermal Q24H   sodium chloride 10 mL Intravenous Q12H     Continuous Infusions:  sodium chloride 100 mL/hr Last Rate: 150 mL/hr (01/16/20 0804)     PRN Meds:.HYDROmorphone  •  labetalol  •  LORazepam **OR** LORazepam **OR** LORazepam **OR** LORazepam **OR** LORazepam **OR** LORazepam  •  magnesium sulfate **OR** magnesium sulfate **OR** magnesium sulfate  •  ondansetron **OR** ondansetron  •  oxyCODONE-acetaminophen  •  potassium chloride **OR** potassium chloride **OR** potassium chloride  •  sodium chloride  •  sodium chloride    Assessment/Plan   Assessment & Plan     Active Hospital Problems    Diagnosis  POA   • **Pancreatitis [K85.90]  Yes   • Suboxone/narcotic abuse, reportedly buys this on the street [F11.10]  Yes   • Essential hypertension [I10]  Yes   • Alcohol dependence with withdrawal (CMS/HCC) [F10.239]  Yes   • Hypokalemia [E87.6]  Yes   • Elevated LFTs [R94.5]  Yes      Resolved Hospital Problems   No resolved problems to display.        Brief Hospital Course to  date:  Eusebio Holley is a 29 y.o. male with history of polysubstance abuse including alcohol abuse/dependence, Suboxone abuse presents the hospital with upper abdominal pain and was found to have acute on chronic alcoholic pancreatitis.    Plan/comments: IV fluid level decreased.  Diet advanced to clear diet.  Labetalol added as needed for hypertension.  Consult placed to addiction team patient has been provided resources.  Extensive counseling regarding food.  Extensive counseling regarding need for sobriety and alcohol abuse.  Recheck labs tomorrow including CMP and CBC to reassess LFTs.    Acute on chronic alcoholic pancreatitis:  Alcohol cessation counseled.  Received aggressive IV fluid resuscitation  Initially made n.p.o. with gradual advancing diet.  Counseled against spicy or fatty foods.    Hypokalemia: Replaced and normalized.    Alcohol with dependence with withdrawal:  No delirium thus far.  Primarily withdrawal symptoms have been tachycardia and tremor.  Careful adjustment with withdrawal protocol.  Cessation counseled.  Seen by addiction team.    Suboxone abuse:  No narcotic withdrawal currently.  Counseled against buying Suboxone on the street.  Seen by addiction team.  Patient states he is interested in outpatient treatment options.  He has been provided resources    Tobacco abuse: Cessation counseled.  Nicotine patch.    Mild alcoholic hepatitis/transaminitis:  Mild.  Monitor intermittently.    Essential hypertension:  Labetalol added as needed.  Likely elevated due to pancreatitis pain.  Needs blood pressure check at primary care follow-up.        DVT Prophylaxis: Lovenox    Disposition: I expect the patient to be discharged home when improved    CODE STATUS:   Code Status and Medical Interventions:   Ordered at: 01/15/20 0117     Level Of Support Discussed With:    Patient     Code Status:    CPR     Medical Interventions (Level of Support Prior to Arrest):    Full         Electronically signed by  Delon Smiley MD, 01/16/20, 11:57 AM.

## 2020-01-16 NOTE — PROGRESS NOTES
Continued Stay Note  [facility]     Patient Name: Eusebio Holley  MRN: [id number]  Today's Date: 1/16/2020    Admit Date: 1/14/2020    Discharge Plan     Row Name 01/16/20 1404       Plan    Plan  update    Patient/Family in Agreement with Plan  yes    Plan Comments  Spoke with patient and wife at bedside regarding discharge plan.  Patient reports that he is leaning toward outpatient therapy.  No new discharge needs verbalized.  Chemical Dependency following.  CM following.  Patient plan is to discharge home and pursue outpatient therpay for subastance abuse via car with family to transport.      Final Discharge Disposition Code  01 - home or self-care        Discharge Codes    No documentation.       Expected Discharge Date and Time     Expected Discharge Date Expected Discharge Time    Jan 17, 2020             Alena Munoz RN

## 2020-01-17 PROBLEM — D69.6 THROMBOCYTOPENIA (HCC): Status: ACTIVE | Noted: 2020-01-17

## 2020-01-17 PROBLEM — G43.009 MIGRAINE WITHOUT AURA: Status: ACTIVE | Noted: 2020-01-17

## 2020-01-17 LAB
ALBUMIN SERPL-MCNC: 4.3 G/DL (ref 3.5–5.2)
ALBUMIN/GLOB SERPL: 1.1 G/DL
ALP SERPL-CCNC: 142 U/L (ref 39–117)
ALT SERPL W P-5'-P-CCNC: 122 U/L (ref 1–41)
ANION GAP SERPL CALCULATED.3IONS-SCNC: 15 MMOL/L (ref 5–15)
AST SERPL-CCNC: 262 U/L (ref 1–40)
BILIRUB SERPL-MCNC: 1.1 MG/DL (ref 0.2–1.2)
BUN BLD-MCNC: 3 MG/DL (ref 6–20)
BUN/CREAT SERPL: 4.5 (ref 7–25)
CALCIUM SPEC-SCNC: 10 MG/DL (ref 8.6–10.5)
CHLORIDE SERPL-SCNC: 100 MMOL/L (ref 98–107)
CO2 SERPL-SCNC: 25 MMOL/L (ref 22–29)
CREAT BLD-MCNC: 0.67 MG/DL (ref 0.76–1.27)
DEPRECATED RDW RBC AUTO: 44.9 FL (ref 37–54)
ERYTHROCYTE [DISTWIDTH] IN BLOOD BY AUTOMATED COUNT: 12 % (ref 12.3–15.4)
GFR SERPL CREATININE-BSD FRML MDRD: 138 ML/MIN/1.73
GLOBULIN UR ELPH-MCNC: 3.8 GM/DL
GLUCOSE BLD-MCNC: 91 MG/DL (ref 65–99)
HCT VFR BLD AUTO: 50 % (ref 37.5–51)
HGB BLD-MCNC: 16.6 G/DL (ref 13–17.7)
MCH RBC QN AUTO: 33.3 PG (ref 26.6–33)
MCHC RBC AUTO-ENTMCNC: 33.2 G/DL (ref 31.5–35.7)
MCV RBC AUTO: 100.4 FL (ref 79–97)
PLATELET # BLD AUTO: 131 10*3/MM3 (ref 140–450)
PMV BLD AUTO: 12.3 FL (ref 6–12)
POTASSIUM BLD-SCNC: 4.4 MMOL/L (ref 3.5–5.2)
PROT SERPL-MCNC: 8.1 G/DL (ref 6–8.5)
RBC # BLD AUTO: 4.98 10*6/MM3 (ref 4.14–5.8)
SODIUM BLD-SCNC: 140 MMOL/L (ref 136–145)
WBC NRBC COR # BLD: 6.33 10*3/MM3 (ref 3.4–10.8)

## 2020-01-17 PROCEDURE — 25010000002 ONDANSETRON PER 1 MG: Performed by: NURSE PRACTITIONER

## 2020-01-17 PROCEDURE — 85027 COMPLETE CBC AUTOMATED: CPT | Performed by: INTERNAL MEDICINE

## 2020-01-17 PROCEDURE — 25010000002 LORAZEPAM PER 2 MG: Performed by: INTERNAL MEDICINE

## 2020-01-17 PROCEDURE — 80053 COMPREHEN METABOLIC PANEL: CPT | Performed by: INTERNAL MEDICINE

## 2020-01-17 PROCEDURE — 25010000002 HYDROMORPHONE PER 4 MG: Performed by: INTERNAL MEDICINE

## 2020-01-17 PROCEDURE — 99233 SBSQ HOSP IP/OBS HIGH 50: CPT | Performed by: INTERNAL MEDICINE

## 2020-01-17 RX ADMIN — BUSPIRONE HYDROCHLORIDE 10 MG: 10 TABLET ORAL at 23:07

## 2020-01-17 RX ADMIN — SODIUM CHLORIDE 75 ML/HR: 9 INJECTION, SOLUTION INTRAVENOUS at 23:07

## 2020-01-17 RX ADMIN — ONDANSETRON 4 MG: 2 INJECTION INTRAMUSCULAR; INTRAVENOUS at 17:19

## 2020-01-17 RX ADMIN — LORAZEPAM 0.5 MG: 0.5 TABLET ORAL at 13:31

## 2020-01-17 RX ADMIN — ONDANSETRON 4 MG: 2 INJECTION INTRAMUSCULAR; INTRAVENOUS at 06:26

## 2020-01-17 RX ADMIN — Medication 200 MG: at 10:12

## 2020-01-17 RX ADMIN — LORAZEPAM 0.5 MG: 2 INJECTION INTRAMUSCULAR; INTRAVENOUS at 20:07

## 2020-01-17 RX ADMIN — SODIUM CHLORIDE 100 ML/HR: 9 INJECTION, SOLUTION INTRAVENOUS at 10:09

## 2020-01-17 RX ADMIN — OXYCODONE HYDROCHLORIDE AND ACETAMINOPHEN 1 TABLET: 7.5; 325 TABLET ORAL at 10:24

## 2020-01-17 RX ADMIN — OXYCODONE HYDROCHLORIDE AND ACETAMINOPHEN 1 TABLET: 7.5; 325 TABLET ORAL at 17:10

## 2020-01-17 RX ADMIN — SODIUM CHLORIDE 100 ML/HR: 9 INJECTION, SOLUTION INTRAVENOUS at 00:00

## 2020-01-17 RX ADMIN — LORAZEPAM 1 MG: 2 INJECTION INTRAMUSCULAR; INTRAVENOUS at 06:27

## 2020-01-17 RX ADMIN — HYDROMORPHONE HYDROCHLORIDE 0.5 MG: 1 INJECTION, SOLUTION INTRAMUSCULAR; INTRAVENOUS; SUBCUTANEOUS at 20:12

## 2020-01-17 RX ADMIN — HYDROMORPHONE HYDROCHLORIDE 0.5 MG: 1 INJECTION, SOLUTION INTRAMUSCULAR; INTRAVENOUS; SUBCUTANEOUS at 06:26

## 2020-01-17 RX ADMIN — LORAZEPAM 0.5 MG: 0.5 TABLET ORAL at 17:19

## 2020-01-17 RX ADMIN — SUMATRIPTAN SUCCINATE 50 MG: 50 TABLET ORAL at 04:56

## 2020-01-17 RX ADMIN — SODIUM CHLORIDE, PRESERVATIVE FREE 10 ML: 5 INJECTION INTRAVENOUS at 20:06

## 2020-01-17 RX ADMIN — MELATONIN TAB 5 MG 5 MG: 5 TAB at 23:07

## 2020-01-17 RX ADMIN — NICOTINE 1 PATCH: 21 PATCH, EXTENDED RELEASE TRANSDERMAL at 10:13

## 2020-01-17 RX ADMIN — BUSPIRONE HYDROCHLORIDE 10 MG: 10 TABLET ORAL at 10:13

## 2020-01-17 NOTE — PROGRESS NOTES
Continued Stay Note  [facility]     Patient Name: Eusebio Holley  MRN: [id number]  Today's Date: 1/17/2020    Admit Date: 1/14/2020    Discharge Plan     Row Name 01/17/20 1448       Plan    Plan  Home    Patient/Family in Agreement with Plan  yes    Plan Comments  Met with patient and his wife in the room to discuss the discharge plan. Patient's plan remains to begin outpatient substance abuse treatment at discharge. Chemical Dependency team is working with patient on options. CM will continue to follow.     Final Discharge Disposition Code  01 - home or self-care        Discharge Codes    No documentation.       Expected Discharge Date and Time     Expected Discharge Date Expected Discharge Time    Jan 19, 2020             Shonda Jeter RN

## 2020-01-17 NOTE — PLAN OF CARE
Problem: Patient Care Overview  Goal: Plan of Care Review  Outcome: Ongoing (interventions implemented as appropriate)  Flowsheets (Taken 1/17/2020 0325)  Progress: no change  Plan of Care Reviewed With: patient; spouse  Outcome Summary: Pt calm and appeared to rest well througout the shift. PRN medication given for pain. Vital signs stable, will continue to monitor.     Problem: Pain, Chronic (Adult)  Goal: Identify Related Risk Factors and Signs and Symptoms  Outcome: Ongoing (interventions implemented as appropriate)  Flowsheets (Taken 1/17/2020 0325)  Related Risk Factors (Chronic Pain): pain control inadequate; coping ineffective; psychosocial factor  Signs and Symptoms (Chronic Pain): verbalization of pain/discomfort for a prolonged time period; verbalization of pain descriptors     Problem: Pain, Chronic (Adult)  Goal: Acceptable Pain/Comfort Level and Functional Ability  Outcome: Ongoing (interventions implemented as appropriate)  Flowsheets (Taken 1/17/2020 0325)  Acceptable Pain/Comfort Level and Functional Ability: making progress toward outcome     Problem: Pancreatitis, Acute/Chronic (Adult)  Goal: Signs and Symptoms of Listed Potential Problems Will be Absent, Minimized or Managed (Pancreatitis, Acute/Chronic)  Outcome: Ongoing (interventions implemented as appropriate)  Flowsheets (Taken 1/17/2020 0325)  Problems Assessed (Pancreatitis): all  Problems Present (Pancreatitis): fluid imbalance; pain; nausea and vomiting     Problem: Alcohol Withdrawal Acute, Risk/Actual (Adult)  Goal: Signs and Symptoms of Listed Potential Problems Will be Absent, Minimized or Managed (Alcohol Withdrawal Acute, Risk/Actual)  Outcome: Ongoing (interventions implemented as appropriate)  Flowsheets (Taken 1/17/2020 0325)  Problems Assessed (Alcohol Withdrawal Syndrome): all  Problems Present (Alcohol W/D Syndrome): effects of CHINMAY (alcohol withdrawal syndrome)

## 2020-01-17 NOTE — PROGRESS NOTES
Continued Stay Note  [facility]     Patient Name: Eusebio Holley  MRN: [id number]  Today's Date: 1/17/2020    Admit Date: 1/14/2020    Discharge Plan     Row Name 01/17/20 1759       Plan    Plan  [facility] with Suboxone    Plan Comments  Met with patient again today- he is not feeling well, feels as if he is going through withdrawal.  At this time, he is still interested in going into IOP at [facility].  Pt had previously been on Suboxone at [facility]- he missed he last appointment and had been buying the Suboxone off the street.  He desires to go into treatment for AUD and for maintenance therapy with Suboxone for his OUD.  We are not in this weekend, and my partner, Elizabeth Grace, [facility] will be in on Monday.  The patient and wife have all of the necessary paperwork for [facility] as well as outreach numbers for us.  If he is still inpatient on Monday and still desires treatment, the navigator for [facility] can be called to do his intake assessment for treatment at their [facility].  Thank you very much for this consult and involving us in his care.        Discharge Codes    No documentation.       Expected Discharge Date and Time     Expected Discharge Date Expected Discharge Time    Jan 19, 2020             Fabi Valdez RN ,BSN   Addiction Coordinator

## 2020-01-17 NOTE — PLAN OF CARE
Problem: Patient Care Overview  Goal: Plan of Care Review  Outcome: Ongoing (interventions implemented as appropriate)  Flowsheets  Taken 1/16/2020 2042 by Ellen Harmon, RN  Progress: improving  Outcome Summary: ciwa 11 at beginning and then 7, calm today, refused to amb, now clear liquids with no nausea, migraine HA today  Taken 1/16/2020 0341 by Prema Gar RN  Plan of Care Reviewed With: patient;spouse;family

## 2020-01-17 NOTE — PROGRESS NOTES
[facility] Medicine Services  PROGRESS NOTE    Patient Name: Eusebio Holley  : 1990  MRN: [id number]    Date of Admission: 2020  Primary Care Physician: Alexis Watters MD    Subjective   Subjective     CC:  Abdominal pain and alcohol withdrawal    HPI:  Patient states he is mostly doing better.  He did have a bout of nausea vomiting and increased abdominal pain transiently at approximately 3 AM.  He resolved with pain medication and nausea medication.  He is tolerating his clear and is requesting solid food today.  He is hoping he can go home as early as tomorrow.  His family is at the bedside providing support and care.  Patient is saying he is agreeable to try outpatient rehabilitation options.      Review of Systems  No current fevers or chills  No current shortness of breath or cough  No current dysuria or hematuria  No current chest pain or palpitations    Objective   Objective     Vital Signs:   Temp:  [97.5 °F (36.4 °C)-98.4 °F (36.9 °C)] 97.5 °F (36.4 °C)  Heart Rate:  [] 92  Resp:  [16-18] 18  BP: (132-150)/() 150/115        Physical Exam:  Constitutional:Awake, alert  HENT: NCAT, mucous membranes moist, neck supple  Respiratory: Clear to auscultation bilaterally, respiratory effort normal, nonlabored breathing   Cardiovascular: RRR, S1, S2, normal radial pulses  Gastrointestinal: Positive bowel sounds, soft, decreasing upper abdominal tenderness without guarding, nondistended  Musculoskeletal: Normal musculature for age, no lower extremity edema, BMI 24  Psychiatric: Appropriate affect, cooperative, conversational  Neurologic: No slurred speech or facial droop, follows commands, oriented  Skin: No rashes or jaundice, warm      Results Reviewed:  Results from last 7 days   Lab Units 20  0800 20  0110 20   WBC 10*3/mm3 6.33 5.79 9.03   HEMOGLOBIN g/dL 16.6 13.5 16.9   HEMATOCRIT % 50.0 40.7 48.4   PLATELETS 10*3/mm3 131*  102* 125*     Results from last 7 days   Lab Units 01/17/20  0800 01/16/20  0110 01/15/20  1258 01/14/20  2034   SODIUM mmol/L 140 139  --  138   POTASSIUM mmol/L 4.4 4.2 3.5 2.9*   CHLORIDE mmol/L 100 103  --  95*   CO2 mmol/L 25.0 23.0  --  24.0   BUN mg/dL 3* 3*  --  4*   CREATININE mg/dL 0.67* 0.53*  --  0.63*   GLUCOSE mg/dL 91 77  --  125*   CALCIUM mg/dL 10.0 8.7  --  9.4   ALT (SGPT) U/L 122* 80*  --  120*   AST (SGOT) U/L 262* 135*  --  211*     Estimated Creatinine Clearance: 171.7 mL/min (A) (by C-G formula based on SCr of 0.67 mg/dL (L)).    Microbiology Results Abnormal     None          Imaging Results (Last 24 Hours)     ** No results found for the last 24 hours. **               I have reviewed the medications:  Scheduled Meds:    busPIRone 10 mg Oral BID   enoxaparin 40 mg Subcutaneous Q24H   melatonin 5 mg Oral Nightly   nicotine 1 patch Transdermal Q24H   sodium chloride 10 mL Intravenous Q12H   thiamine 200 mg Oral Daily     Continuous Infusions:    sodium chloride 75 mL/hr Last Rate: 100 mL/hr (01/17/20 1009)     PRN Meds:.HYDROmorphone  •  labetalol  •  LORazepam **OR** LORazepam **OR** [DISCONTINUED] LORazepam **OR** [DISCONTINUED] LORazepam **OR** [DISCONTINUED] LORazepam **OR** [DISCONTINUED] LORazepam  •  magnesium sulfate **OR** magnesium sulfate **OR** magnesium sulfate  •  ondansetron **OR** ondansetron  •  oxyCODONE-acetaminophen  •  potassium chloride **OR** potassium chloride **OR** potassium chloride  •  sodium chloride  •  sodium chloride  •  SUMAtriptan    Assessment/Plan   Assessment & Plan     Active Hospital Problems    Diagnosis  POA   • **Pancreatitis [K85.90]  Yes   • Migraine without aura [G43.009]  Yes   • Thrombocytopenia, mild felt to be alcohol related [D69.6]  Yes   • Suboxone/narcotic abuse, reportedly buys this on the street [F11.10]  Yes   • Essential hypertension [I10]  Yes   • Alcohol dependence with withdrawal (CMS/HCC) [F10.239]  Yes   • Hypokalemia [E87.6]  Yes    • Elevated LFTs [R94.5]  Yes      Resolved Hospital Problems   No resolved problems to display.        Brief Hospital Course to date:  Eusebio Holley is a 29 y.o. male with history of polysubstance abuse including alcohol abuse/dependence, Suboxone abuse presents the hospital with upper abdominal pain and was found to have acute on chronic alcoholic pancreatitis.    Plan/comments: Mild thrombocytopenia stable.  Felt to be related to alcohol intake.  Transaminitis slightly increased today but clinically looks better.  Plan to repeat laboratory studies tomorrow including CMP to reassess and continue to monitor clinically.  Advance diet to a bland low-fat mechanical soft diet with no eggs.  Imitrex added for migraines as patient tolerates this medication well.  He is having some improvement so far with migraine control.  IV fluid decreased.  Ativan scale for withdrawal decreased as withdrawal resolving.     Acute on chronic alcoholic pancreatitis:   Alcohol cessation counseled.  Received aggressive IV fluid resuscitation  Initially made n.p.o. with gradual advancing diet.  Counseled against spicy or fatty foods.    Hypokalemia: Replaced and normalized.    Alcohol with dependence with withdrawal:  No delirium    Primarily withdrawal symptoms have been tachycardia and tremor.  Cessation counseled.  Seen by addiction team.    Suboxone abuse:  No narcotic withdrawal currently.  Counseled against buying Suboxone on the street.  Seen by addiction team.  Patient states he is interested in outpatient treatment options.  He has been provided resources    Tobacco abuse: Cessation counseled.  Nicotine patch.    Mild alcoholic hepatitis/transaminitis:  Mild.  Monitor intermittently.    Essential hypertension:  Labetalol added as needed.  Likely elevated due to pancreatitis pain.  Needs blood pressure check at primary care follow-up.        DVT Prophylaxis: Lovenox    Disposition: I expect the patient to be discharged home when  improved    CODE STATUS:   Code Status and Medical Interventions:   Ordered at: 01/15/20 0117     Level Of Support Discussed With:    Patient     Code Status:    CPR     Medical Interventions (Level of Support Prior to Arrest):    Full         Electronically signed by Delon Smiley MD, 01/17/20, 11:30 AM.

## 2020-01-18 VITALS
DIASTOLIC BLOOD PRESSURE: 91 MMHG | WEIGHT: 167.6 LBS | OXYGEN SATURATION: 95 % | SYSTOLIC BLOOD PRESSURE: 127 MMHG | BODY MASS INDEX: 24.82 KG/M2 | TEMPERATURE: 98.3 F | HEART RATE: 85 BPM | HEIGHT: 69 IN | RESPIRATION RATE: 18 BRPM

## 2020-01-18 PROBLEM — E87.6 HYPOKALEMIA: Status: RESOLVED | Noted: 2020-01-15 | Resolved: 2020-01-18

## 2020-01-18 PROBLEM — G43.009 MIGRAINE WITHOUT AURA: Status: RESOLVED | Noted: 2020-01-17 | Resolved: 2020-01-18

## 2020-01-18 LAB
ALBUMIN SERPL-MCNC: 3.8 G/DL (ref 3.5–5.2)
ALBUMIN/GLOB SERPL: 1.3 G/DL
ALP SERPL-CCNC: 118 U/L (ref 39–117)
ALT SERPL W P-5'-P-CCNC: 103 U/L (ref 1–41)
ANION GAP SERPL CALCULATED.3IONS-SCNC: 13 MMOL/L (ref 5–15)
AST SERPL-CCNC: 203 U/L (ref 1–40)
BILIRUB SERPL-MCNC: 0.9 MG/DL (ref 0.2–1.2)
BUN BLD-MCNC: 6 MG/DL (ref 6–20)
BUN/CREAT SERPL: 9.2 (ref 7–25)
CALCIUM SPEC-SCNC: 9.6 MG/DL (ref 8.6–10.5)
CHLORIDE SERPL-SCNC: 102 MMOL/L (ref 98–107)
CO2 SERPL-SCNC: 23 MMOL/L (ref 22–29)
CREAT BLD-MCNC: 0.65 MG/DL (ref 0.76–1.27)
GFR SERPL CREATININE-BSD FRML MDRD: 143 ML/MIN/1.73
GLOBULIN UR ELPH-MCNC: 2.9 GM/DL
GLUCOSE BLD-MCNC: 100 MG/DL (ref 65–99)
POTASSIUM BLD-SCNC: 3.2 MMOL/L (ref 3.5–5.2)
PROT SERPL-MCNC: 6.7 G/DL (ref 6–8.5)
SODIUM BLD-SCNC: 138 MMOL/L (ref 136–145)

## 2020-01-18 PROCEDURE — 25010000002 ENOXAPARIN PER 10 MG: Performed by: NURSE PRACTITIONER

## 2020-01-18 PROCEDURE — 25010000002 ONDANSETRON PER 1 MG: Performed by: NURSE PRACTITIONER

## 2020-01-18 PROCEDURE — 80053 COMPREHEN METABOLIC PANEL: CPT | Performed by: INTERNAL MEDICINE

## 2020-01-18 PROCEDURE — 99239 HOSP IP/OBS DSCHRG MGMT >30: CPT | Performed by: NURSE PRACTITIONER

## 2020-01-18 RX ORDER — BUSPIRONE HYDROCHLORIDE 10 MG/1
10 TABLET ORAL 2 TIMES DAILY
Qty: 60 TABLET | Refills: 0 | Status: ON HOLD | OUTPATIENT
Start: 2020-01-18 | End: 2020-10-26 | Stop reason: SDUPTHER

## 2020-01-18 RX ORDER — TRAMADOL HYDROCHLORIDE 50 MG/1
50 TABLET ORAL EVERY 8 HOURS PRN
Qty: 8 TABLET | Refills: 0 | Status: SHIPPED | OUTPATIENT
Start: 2020-01-18 | End: 2020-05-22

## 2020-01-18 RX ADMIN — POTASSIUM CHLORIDE 40 MEQ: 750 CAPSULE, EXTENDED RELEASE ORAL at 10:35

## 2020-01-18 RX ADMIN — LORAZEPAM 0.5 MG: 0.5 TABLET ORAL at 14:30

## 2020-01-18 RX ADMIN — ONDANSETRON 4 MG: 2 INJECTION INTRAMUSCULAR; INTRAVENOUS at 06:40

## 2020-01-18 RX ADMIN — BUSPIRONE HYDROCHLORIDE 10 MG: 10 TABLET ORAL at 09:53

## 2020-01-18 RX ADMIN — LORAZEPAM 0.5 MG: 0.5 TABLET ORAL at 06:45

## 2020-01-18 RX ADMIN — ENOXAPARIN SODIUM 40 MG: 40 INJECTION SUBCUTANEOUS at 09:54

## 2020-01-18 RX ADMIN — Medication 200 MG: at 09:53

## 2020-01-18 RX ADMIN — POTASSIUM CHLORIDE 40 MEQ: 750 CAPSULE, EXTENDED RELEASE ORAL at 14:30

## 2020-01-18 RX ADMIN — OXYCODONE HYDROCHLORIDE AND ACETAMINOPHEN 1 TABLET: 7.5; 325 TABLET ORAL at 10:35

## 2020-01-18 RX ADMIN — OXYCODONE HYDROCHLORIDE AND ACETAMINOPHEN 1 TABLET: 7.5; 325 TABLET ORAL at 06:40

## 2020-01-18 RX ADMIN — LORAZEPAM 0.5 MG: 0.5 TABLET ORAL at 10:35

## 2020-01-18 NOTE — PLAN OF CARE
Problem: Patient Care Overview  Goal: Plan of Care Review  Outcome: Ongoing (interventions implemented as appropriate)  Flowsheets (Taken 1/18/2020 0328)  Progress: no change  Plan of Care Reviewed With: patient; spouse  Outcome Summary: CIWA at 10 at beginning of shift. IV ativan and pain medication administered and pt appeared to rest well the rest of shift. BP slightly elevated but decreased after IV medications administered. Will continue to monitor.     Problem: Pain, Chronic (Adult)  Goal: Identify Related Risk Factors and Signs and Symptoms  Outcome: Ongoing (interventions implemented as appropriate)  Flowsheets (Taken 1/18/2020 0328)  Related Risk Factors (Chronic Pain): pain control inadequate; coping ineffective; psychosocial factor  Signs and Symptoms (Chronic Pain): abnormal posturing/positioning; verbalization of pain/discomfort for a prolonged time period; verbalization of pain descriptors     Problem: Pain, Chronic (Adult)  Goal: Acceptable Pain/Comfort Level and Functional Ability  Outcome: Ongoing (interventions implemented as appropriate)  Flowsheets (Taken 1/18/2020 0328)  Acceptable Pain/Comfort Level and Functional Ability: making progress toward outcome     Problem: Pancreatitis, Acute/Chronic (Adult)  Goal: Signs and Symptoms of Listed Potential Problems Will be Absent, Minimized or Managed (Pancreatitis, Acute/Chronic)  Outcome: Ongoing (interventions implemented as appropriate)  Flowsheets (Taken 1/18/2020 0328)  Problems Assessed (Pancreatitis): all  Problems Present (Pancreatitis): pain; nausea and vomiting     Problem: Alcohol Withdrawal Acute, Risk/Actual (Adult)  Goal: Signs and Symptoms of Listed Potential Problems Will be Absent, Minimized or Managed (Alcohol Withdrawal Acute, Risk/Actual)  Outcome: Ongoing (interventions implemented as appropriate)  Flowsheets (Taken 1/18/2020 0328)  Problems Assessed (Alcohol Withdrawal Syndrome): all  Problems Present (Alcohol W/D Syndrome):  effects of CHINMAY (alcohol withdrawal syndrome)

## 2020-01-18 NOTE — DISCHARGE SUMMARY
[facility] Medicine Services  DISCHARGE SUMMARY    Patient Name: Eusebio Holley  : 1990  MRN: [id number]    Date of Admission: 2020  9:42 PM  Date of Discharge:  20  Primary Care Physician: Alexis Watters MD    Consults     No orders found from 2019 to 1/15/2020.          Hospital Course     Presenting Problem:   Pancreatitis [K85.90]    Active Hospital Problems    Diagnosis  POA   • **Pancreatitis [K85.90]  Yes   • Thrombocytopenia, mild felt to be alcohol related [D69.6]  Yes   • Suboxone/narcotic abuse, reportedly buys this on the street [F11.10]  Yes   • Essential hypertension [I10]  Yes   • Alcohol dependence with withdrawal (CMS/HCC) [F10.239]  Yes   • Elevated LFTs [R94.5]  Yes      Resolved Hospital Problems    Diagnosis Date Resolved POA   • Migraine without aura [G43.009] 2020 Yes   • Hypokalemia [E87.6] 2020 Yes          Hospital Course:  Eusebio Holley is a 29 y.o. male with history of polysubstance abuse including alcohol abuse/dependence, Suboxone abuse presents the hospital with upper abdominal pain and was found to have acute on chronic alcoholic pancreatitis. Patient received IV thiamine, IV pain medication and aggressive IV fluid resuscitation during stay. Pain has improved and withdrawal symptoms have improved. He was seen by the addiction counselor and was provided with resources for outpatient rehab programs. The patient reports that he intends on attending [facility].      Acute on chronic alcoholic pancreatitis:   Alcohol cessation counseled.  Received aggressive IV fluid resuscitation  Initially made n.p.o. with gradual advancing diet.  Counseled against spicy or fatty foods.  Pain improved  Tolerating oral intake     Hypokalemia  Replaced potassium chloride 40 mEq x 2 today  Follow up BMP with PCP in 1 week     Alcohol with dependence with withdrawal:  No delirium, tremors improved    Primarily withdrawal symptoms  have been tachycardia and tremor.  Cessation counseled.  Seen by addiction team.   Per CM, patient has plans to enroll in [facility] substance abuse program     Suboxone abuse:  No narcotic withdrawal currently.  Counseled against buying Suboxone on the street.  Seen by addiction team.  Patient states he is interested in outpatient treatment at [facility].  He has been provided resources     Tobacco abuse:   Cessation counseled.      Mild alcoholic hepatitis/transaminitis:  Mild.  Monitor intermittently.     Essential hypertension:  Improved  Likely elevated due to pancreatitis pain.  Needs blood pressure check at primary care follow-up.      Discharge Follow Up Recommendations for outpatient labs/diagnostics:  Follow up with PCP in 1 week to check blood pressure and get BMP.     Day of Discharge     HPI:   Patient resting in bed. Significant other at bedside. No acute events overnight per nursing. He states that his is ready to go home. He reports that his tremors have improved. He denies nausea, vomiting, or diarrhea. Patient counseled     Review of Systems  Gen- No fevers, chills  CV- No chest pain, palpitations  Resp- No cough, dyspnea  GI- No N/V/D, abd pain    Vital Signs:   Temp:  [97.8 °F (36.6 °C)-98.5 °F (36.9 °C)] 98.3 °F (36.8 °C)  Heart Rate:  [] 85  Resp:  [18] 18  BP: (125-155)/() 127/91     Physical Exam:  Constitutional: No acute distress, awake, alert  HENT: NCAT, mucous membranes moist  Respiratory: Clear to auscultation bilaterally, respiratory effort normal   Cardiovascular: RRR, no murmurs, palpable pedal pulses bilaterally  Gastrointestinal: Positive bowel sounds, soft, nontender, nondistended  Musculoskeletal: No bilateral ankle edema  Psychiatric: Appropriate affect, cooperative  Neurologic: Oriented x 3, strength symmetric in all extremities, speech clear  Skin: warm, dry, no visible rashes    Pertinent  and/or Most Recent Results     Results from last 7 days   Lab Units  01/18/20  0739 01/17/20  0800 01/16/20  0110 01/15/20  1258 01/14/20  2034   WBC 10*3/mm3  --  6.33 5.79  --  9.03   HEMOGLOBIN g/dL  --  16.6 13.5  --  16.9   HEMATOCRIT %  --  50.0 40.7  --  48.4   PLATELETS 10*3/mm3  --  131* 102*  --  125*   SODIUM mmol/L 138 140 139  --  138   POTASSIUM mmol/L 3.2* 4.4 4.2 3.5 2.9*   CHLORIDE mmol/L 102 100 103  --  95*   CO2 mmol/L 23.0 25.0 23.0  --  24.0   BUN mg/dL 6 3* 3*  --  4*   CREATININE mg/dL 0.65* 0.67* 0.53*  --  0.63*   GLUCOSE mg/dL 100* 91 77  --  125*   CALCIUM mg/dL 9.6 10.0 8.7  --  9.4     Results from last 7 days   Lab Units 01/18/20  0739 01/17/20  0800 01/16/20  0110 01/14/20  2034   BILIRUBIN mg/dL 0.9 1.1 1.0 1.0   ALK PHOS U/L 118* 142* 101 124*   ALT (SGPT) U/L 103* 122* 80* 120*   AST (SGOT) U/L 203* 262* 135* 211*     Results from last 7 days   Lab Units 01/14/20  2034   CHOLESTEROL mg/dL 156   TRIGLYCERIDES mg/dL 107   HDL CHOL mg/dL 90*     Results from last 7 days   Lab Units 01/15/20  1258   TSH uIU/mL 4.470*       Brief Urine Lab Results  (Last result in the past 365 days)      Color   Clarity   Blood   Leuk Est   Nitrite   Protein   CREAT   Urine HCG        01/14/20 2144 Yellow Clear Negative Negative Negative Trace               Microbiology Results Abnormal     None          Imaging Results (All)     Procedure Component Value Units Date/Time    CT Abdomen Pelvis With Contrast [[id number]] Collected:  01/14/20 2330     Updated:  01/14/20 2332    Narrative:       CT Abdomen Pelvis W    INDICATION:   29-year-old male with right lower quadrant abdominal pain for 2 to 3 days.    TECHNIQUE:   CT of the abdomen and pelvis with IV contrast. Coronal and sagittal reconstructions were obtained.  Radiation dose reduction techniques included automated exposure control or exposure modulation based on body size. Count of known CT and cardiac nuc med  studies performed in previous 12 months: 0.     COMPARISON:   None available.    FINDINGS:  Visualized lung  bases are unremarkable.    Abdomen:   Diffuse fatty infiltration throughout the liver. The spleen is within normal limits. There is edema and inflammatory stranding surrounding the pancreas, consistent with acute pancreatitis. No peripancreatic fluid collections. The gallbladder is normal.  Both adrenal glands are normal. The kidneys are normal. Abdominal aorta normal in course and caliber. Small bowel is unremarkable without obstruction. Normal appendix. The colon is unremarkable. No free fluid or free air.    Pelvis:   The urinary bladder is unremarkable.  The prostate gland is unremarkable. Small amount of free pelvic fluid.    No acute osseous abnormality.        Impression:         1. Acute pancreatitis. No drainable peripancreatic fluid collections.  2. Normal appendix.  3. Hepatic steatosis.  4. Small amount of free pelvic fluid.          Signer Name: Kurt Mandujano MD   Signed: 1/14/2020 11:30 PM   [facility]: [email or web address]    [facility]    XR Chest 1 View [[id number]] Collected:  01/14/20 2240     Updated:  01/14/20 2243    Narrative:       CR Chest 1 Vw    INDICATION:   29-year-old male with mid chest pain over the last couple of months.     COMPARISON:    None available.    FINDINGS:  Single portable AP view(s) of the chest.  The heart and mediastinal contours are normal. The lungs are clear. No pneumothorax or pleural effusion.      Impression:       No acute cardiopulmonary findings.    Signer Name: Kurt Mandujano MD   Signed: 1/14/2020 10:40 PM   [facility]station Name: [email or web address]    [facility]iology [facility]                         Plan for Follow-up of Pending Labs/Results: Follow up with PCP in 1 week.     Discharge Details        Discharge Medications      New Medications      Instructions Start Date   busPIRone 10 MG tablet  Commonly known as:  BUSPAR   10 mg, Oral, 2 Times Daily      traMADol 50 MG tablet  Commonly known as:  ULTRAM   50 mg, Oral, Every 8 Hours  PRN         Continue These Medications      Instructions Start Date   SUMAtriptan 50 MG tablet  Commonly known as:  IMITREX   50 mg, Oral, Every 2 Hours PRN, Take one tablet at onset of headache. May repeat dose one time in 2 hours if headache not relieved.          Stop These Medications    buprenorphine-naloxone 8-2 MG per SL tablet  Commonly known as:  SUBOXONE            Allergies   Allergen Reactions   • Penicillins Other (See Comments)     Unknown; patient was a child         Discharge Disposition:  Home or Self Care    Diet:  Hospital:  Diet Order   Procedures   • Diet Regular; [place], GI Soft, Low Fat       Activity:  Activity Instructions     Activity as Tolerated               CODE STATUS:    Code Status and Medical Interventions:   Ordered at: 01/15/20 0117     Level Of Support Discussed With:    Patient     Code Status:    CPR     Medical Interventions (Level of Support Prior to Arrest):    Full       No future appointments.    Additional Instructions for the Follow-ups that You Need to Schedule     Discharge Follow-up with PCP   As directed       Currently Documented PCP:    Alexsi Watters MD    PCP Phone Number:    [phone]     Follow Up Details:  Follow up with PCP in 1 week               Time Spent on Discharge:  31 minutes    Electronically signed by URIAH Bolaños, 01/18/20, 1:18 PM.

## 2020-01-18 NOTE — PLAN OF CARE
Problem: Patient Care Overview  Goal: Plan of Care Review  Outcome: Ongoing (interventions implemented as appropriate)  Flowsheets  Taken 1/17/2020 2116 by Ellen Harmon, RN  Progress: no change  Outcome Summary: ciwa 6-11, no n/v, intermittent abd pain and HA, denies migraines today, amb to BR, BM today, GI soft bland diet today eating well, ns at 75ml/hr  Taken 1/17/2020 1024 by Joanne Camejo, Nursing Student  Plan of Care Reviewed With: patient;spouse

## 2020-01-18 NOTE — PROGRESS NOTES
Case Management Discharge Note      Final Note: Spoke with patient and wife at bedside regarding discharge plan.  Patient reports he has all the paperwork and numbers to call to make arrangments with [facility].  Patient denies discharge needs.  Patient plan is to discharge home today via car with family to transport.      Provided post acute provider list?: Refused    Destination      No service has been selected for the patient.      Durable Medical Equipment      No service has been selected for the patient.      Dialysis/Infusion      No service has been selected for the patient.      Home Medical Care      No service has been selected for the patient.      Therapy      No service has been selected for the patient.      Community Resources      No service has been selected for the patient.             Final Discharge Disposition Code: 01 - home or self-care

## 2020-01-19 ENCOUNTER — READMISSION MANAGEMENT (OUTPATIENT)
Dept: CALL CENTER | Facility: HOSPITAL | Age: 30
End: 2020-01-19

## 2020-01-19 NOTE — OUTREACH NOTE
Prep Survey      Responses   Facility patient discharged from?  [place]   Is patient eligible?  No   What are the reasons patient is not eligible?  [facility]   Discharge diagnosis  A/C alcoholic pancreatitis   Does the patient have one of the following disease processes/diagnoses(primary or secondary)?  Other   Prep survey completed?  Yes          Gala Luke RN

## 2020-05-22 ENCOUNTER — APPOINTMENT (OUTPATIENT)
Dept: GENERAL RADIOLOGY | Facility: HOSPITAL | Age: 30
End: 2020-05-22

## 2020-05-22 ENCOUNTER — HOSPITAL ENCOUNTER (INPATIENT)
Facility: HOSPITAL | Age: 30
LOS: 7 days | Discharge: LEFT BEFORE DISCHARGE | End: 2020-05-29
Attending: EMERGENCY MEDICINE | Admitting: INTERNAL MEDICINE

## 2020-05-22 ENCOUNTER — APPOINTMENT (OUTPATIENT)
Dept: CT IMAGING | Facility: HOSPITAL | Age: 30
End: 2020-05-22

## 2020-05-22 DIAGNOSIS — R19.7 NAUSEA VOMITING AND DIARRHEA: ICD-10-CM

## 2020-05-22 DIAGNOSIS — K85.20 ALCOHOL-INDUCED ACUTE PANCREATITIS WITHOUT INFECTION OR NECROSIS: Primary | ICD-10-CM

## 2020-05-22 DIAGNOSIS — F10.931 DELIRIUM TREMENS (HCC): ICD-10-CM

## 2020-05-22 DIAGNOSIS — K70.10 ALCOHOLIC HEPATITIS WITHOUT ASCITES: ICD-10-CM

## 2020-05-22 DIAGNOSIS — R11.2 NAUSEA VOMITING AND DIARRHEA: ICD-10-CM

## 2020-05-22 DIAGNOSIS — F10.20 ALCOHOLISM (HCC): ICD-10-CM

## 2020-05-22 DIAGNOSIS — R10.84 GENERALIZED ABDOMINAL PAIN: ICD-10-CM

## 2020-05-22 PROBLEM — E87.20 LACTIC ACIDOSIS: Status: ACTIVE | Noted: 2020-05-22

## 2020-05-22 LAB
ALBUMIN SERPL-MCNC: 3 G/DL (ref 3.5–5.2)
ALBUMIN SERPL-MCNC: 3.5 G/DL (ref 3.5–5.2)
ALBUMIN/GLOB SERPL: 0.9 G/DL
ALBUMIN/GLOB SERPL: 1.1 G/DL
ALP SERPL-CCNC: 418 U/L (ref 39–117)
ALP SERPL-CCNC: 490 U/L (ref 39–117)
ALT SERPL W P-5'-P-CCNC: 105 U/L (ref 1–41)
ALT SERPL W P-5'-P-CCNC: 120 U/L (ref 1–41)
AMPHET+METHAMPHET UR QL: NEGATIVE
AMPHETAMINES UR QL: NEGATIVE
ANION GAP SERPL CALCULATED.3IONS-SCNC: 21 MMOL/L (ref 5–15)
ANION GAP SERPL CALCULATED.3IONS-SCNC: 23 MMOL/L (ref 5–15)
APTT PPP: 37.9 SECONDS (ref 24–37)
ARTERIAL PATENCY WRIST A: ABNORMAL
AST SERPL-CCNC: 461 U/L (ref 1–40)
AST SERPL-CCNC: 508 U/L (ref 1–40)
ATMOSPHERIC PRESS: ABNORMAL MM[HG]
BARBITURATES UR QL SCN: NEGATIVE
BASE EXCESS BLDA CALC-SCNC: -0.1 MMOL/L (ref 0–2)
BASOPHILS # BLD AUTO: 0.1 10*3/MM3 (ref 0–0.2)
BASOPHILS # BLD AUTO: 0.12 10*3/MM3 (ref 0–0.2)
BASOPHILS NFR BLD AUTO: 1 % (ref 0–1.5)
BASOPHILS NFR BLD AUTO: 1.2 % (ref 0–1.5)
BDY SITE: ABNORMAL
BENZODIAZ UR QL SCN: NEGATIVE
BILIRUB SERPL-MCNC: 7.4 MG/DL (ref 0.2–1.2)
BILIRUB SERPL-MCNC: 7.4 MG/DL (ref 0.2–1.2)
BILIRUB UR QL STRIP: ABNORMAL
BODY TEMPERATURE: 37 C
BUN BLD-MCNC: 2 MG/DL (ref 6–20)
BUN BLD-MCNC: 2 MG/DL (ref 6–20)
BUN/CREAT SERPL: 3.3 (ref 7–25)
BUN/CREAT SERPL: 3.3 (ref 7–25)
BUPRENORPHINE SERPL-MCNC: POSITIVE NG/ML
CALCIUM SPEC-SCNC: 8.2 MG/DL (ref 8.6–10.5)
CALCIUM SPEC-SCNC: 8.9 MG/DL (ref 8.6–10.5)
CANNABINOIDS SERPL QL: NEGATIVE
CHLORIDE SERPL-SCNC: 100 MMOL/L (ref 98–107)
CHLORIDE SERPL-SCNC: 92 MMOL/L (ref 98–107)
CLARITY UR: CLEAR
CO2 BLDA-SCNC: 23.8 MMOL/L (ref 22–33)
CO2 SERPL-SCNC: 18 MMOL/L (ref 22–29)
CO2 SERPL-SCNC: 23 MMOL/L (ref 22–29)
COCAINE UR QL: NEGATIVE
COHGB MFR BLD: 2 % (ref 0–2)
COLOR UR: ABNORMAL
CREAT BLD-MCNC: 0.6 MG/DL (ref 0.76–1.27)
CREAT BLD-MCNC: 0.6 MG/DL (ref 0.76–1.27)
D-LACTATE SERPL-SCNC: 6.3 MMOL/L (ref 0.5–2)
D-LACTATE SERPL-SCNC: 7.1 MMOL/L (ref 0.5–2)
D-LACTATE SERPL-SCNC: 9.1 MMOL/L (ref 0.5–2)
DEPRECATED RDW RBC AUTO: 66.2 FL (ref 37–54)
DEPRECATED RDW RBC AUTO: 74 FL (ref 37–54)
EOSINOPHIL # BLD AUTO: 0.01 10*3/MM3 (ref 0–0.4)
EOSINOPHIL # BLD AUTO: 0.14 10*3/MM3 (ref 0–0.4)
EOSINOPHIL NFR BLD AUTO: 0.1 % (ref 0.3–6.2)
EOSINOPHIL NFR BLD AUTO: 1.4 % (ref 0.3–6.2)
ERYTHROCYTE [DISTWIDTH] IN BLOOD BY AUTOMATED COUNT: 19.2 % (ref 12.3–15.4)
ERYTHROCYTE [DISTWIDTH] IN BLOOD BY AUTOMATED COUNT: 20 % (ref 12.3–15.4)
ETHANOL BLD-MCNC: 213 MG/DL (ref 0–10)
GFR SERPL CREATININE-BSD FRML MDRD: >150 ML/MIN/1.73
GFR SERPL CREATININE-BSD FRML MDRD: >150 ML/MIN/1.73
GLOBULIN UR ELPH-MCNC: 3.2 GM/DL
GLOBULIN UR ELPH-MCNC: 3.4 GM/DL
GLUCOSE BLD-MCNC: 106 MG/DL (ref 65–99)
GLUCOSE BLD-MCNC: 106 MG/DL (ref 65–99)
GLUCOSE UR STRIP-MCNC: NEGATIVE MG/DL
HAV IGM SERPL QL IA: NORMAL
HBV CORE IGM SERPL QL IA: NORMAL
HBV SURFACE AG SERPL QL IA: NORMAL
HCO3 BLDA-SCNC: 22.8 MMOL/L (ref 20–26)
HCT VFR BLD AUTO: 40.7 % (ref 37.5–51)
HCT VFR BLD AUTO: 42.3 % (ref 37.5–51)
HCT VFR BLD CALC: 40.6 %
HCV AB SER DONR QL: NORMAL
HGB BLD-MCNC: 13.9 G/DL (ref 13–17.7)
HGB BLD-MCNC: 15.3 G/DL (ref 13–17.7)
HGB BLDA-MCNC: 13.3 G/DL (ref 13.5–17.5)
HGB UR QL STRIP.AUTO: NEGATIVE
HOLD SPECIMEN: NORMAL
HOLD SPECIMEN: NORMAL
HOROWITZ INDEX BLD+IHG-RTO: 21 %
IMM GRANULOCYTES # BLD AUTO: 0.02 10*3/MM3 (ref 0–0.05)
IMM GRANULOCYTES # BLD AUTO: 0.05 10*3/MM3 (ref 0–0.05)
IMM GRANULOCYTES NFR BLD AUTO: 0.2 % (ref 0–0.5)
IMM GRANULOCYTES NFR BLD AUTO: 0.5 % (ref 0–0.5)
INR PPP: 1.37 (ref 0.85–1.16)
KETONES UR QL STRIP: ABNORMAL
LACTATE HOLD SPECIMEN: NORMAL
LEUKOCYTE ESTERASE UR QL STRIP.AUTO: NEGATIVE
LIPASE SERPL-CCNC: 245 U/L (ref 13–60)
LIPASE SERPL-CCNC: 331 U/L (ref 13–60)
LYMPHOCYTES # BLD AUTO: 0.94 10*3/MM3 (ref 0.7–3.1)
LYMPHOCYTES # BLD AUTO: 2.72 10*3/MM3 (ref 0.7–3.1)
LYMPHOCYTES NFR BLD AUTO: 27.1 % (ref 19.6–45.3)
LYMPHOCYTES NFR BLD AUTO: 9 % (ref 19.6–45.3)
MAGNESIUM SERPL-MCNC: 1.9 MG/DL (ref 1.6–2.6)
MCH RBC QN AUTO: 34.6 PG (ref 26.6–33)
MCH RBC QN AUTO: 34.7 PG (ref 26.6–33)
MCHC RBC AUTO-ENTMCNC: 34.2 G/DL (ref 31.5–35.7)
MCHC RBC AUTO-ENTMCNC: 36.2 G/DL (ref 31.5–35.7)
MCV RBC AUTO: 101.2 FL (ref 79–97)
MCV RBC AUTO: 95.9 FL (ref 79–97)
METHADONE UR QL SCN: NEGATIVE
METHGB BLD QL: 1 % (ref 0–1.5)
MODALITY: ABNORMAL
MONOCYTES # BLD AUTO: 1.19 10*3/MM3 (ref 0.1–0.9)
MONOCYTES # BLD AUTO: 1.25 10*3/MM3 (ref 0.1–0.9)
MONOCYTES NFR BLD AUTO: 11.5 % (ref 5–12)
MONOCYTES NFR BLD AUTO: 12.5 % (ref 5–12)
NEUTROPHILS # BLD AUTO: 5.77 10*3/MM3 (ref 1.7–7)
NEUTROPHILS # BLD AUTO: 8.1 10*3/MM3 (ref 1.7–7)
NEUTROPHILS NFR BLD AUTO: 57.6 % (ref 42.7–76)
NEUTROPHILS NFR BLD AUTO: 77.9 % (ref 42.7–76)
NITRITE UR QL STRIP: NEGATIVE
NOTE: ABNORMAL
NRBC BLD AUTO-RTO: 0 /100 WBC (ref 0–0.2)
NRBC BLD AUTO-RTO: 0 /100 WBC (ref 0–0.2)
OPIATES UR QL: NEGATIVE
OXYCODONE UR QL SCN: NEGATIVE
OXYHGB MFR BLDV: 91.6 % (ref 94–99)
PCO2 BLDA: 31.5 MM HG (ref 35–45)
PCO2 TEMP ADJ BLD: 31.5 MM HG (ref 35–48)
PCP UR QL SCN: NEGATIVE
PH BLDA: 7.47 PH UNITS (ref 7.35–7.45)
PH UR STRIP.AUTO: 6.5 [PH] (ref 5–8)
PH, TEMP CORRECTED: 7.47 PH UNITS
PLATELET # BLD AUTO: 112 10*3/MM3 (ref 140–450)
PLATELET # BLD AUTO: 135 10*3/MM3 (ref 140–450)
PMV BLD AUTO: 12.2 FL (ref 6–12)
PMV BLD AUTO: 12.4 FL (ref 6–12)
PO2 BLDA: 70.3 MM HG (ref 83–108)
PO2 TEMP ADJ BLD: 70.3 MM HG (ref 83–108)
POTASSIUM BLD-SCNC: 3.5 MMOL/L (ref 3.5–5.2)
POTASSIUM BLD-SCNC: 4.2 MMOL/L (ref 3.5–5.2)
PROPOXYPH UR QL: NEGATIVE
PROT SERPL-MCNC: 6.4 G/DL (ref 6–8.5)
PROT SERPL-MCNC: 6.7 G/DL (ref 6–8.5)
PROT UR QL STRIP: NEGATIVE
PROTHROMBIN TIME: 16.6 SECONDS (ref 11.5–14)
RBC # BLD AUTO: 4.02 10*6/MM3 (ref 4.14–5.8)
RBC # BLD AUTO: 4.41 10*6/MM3 (ref 4.14–5.8)
SODIUM BLD-SCNC: 138 MMOL/L (ref 136–145)
SODIUM BLD-SCNC: 139 MMOL/L (ref 136–145)
SP GR UR STRIP: 1.01 (ref 1–1.03)
TRICYCLICS UR QL SCN: NEGATIVE
TRIGL SERPL-MCNC: 235 MG/DL (ref 0–150)
UROBILINOGEN UR QL STRIP: ABNORMAL
VENTILATOR MODE: ABNORMAL
WBC NRBC COR # BLD: 10.02 10*3/MM3 (ref 3.4–10.8)
WBC NRBC COR # BLD: 10.39 10*3/MM3 (ref 3.4–10.8)
WHOLE BLOOD HOLD SPECIMEN: NORMAL
WHOLE BLOOD HOLD SPECIMEN: NORMAL

## 2020-05-22 PROCEDURE — 80307 DRUG TEST PRSMV CHEM ANLYZR: CPT | Performed by: PHYSICIAN ASSISTANT

## 2020-05-22 PROCEDURE — 85025 COMPLETE CBC W/AUTO DIFF WBC: CPT | Performed by: EMERGENCY MEDICINE

## 2020-05-22 PROCEDURE — 99253 IP/OBS CNSLTJ NEW/EST LOW 45: CPT | Performed by: PHYSICIAN ASSISTANT

## 2020-05-22 PROCEDURE — 83605 ASSAY OF LACTIC ACID: CPT | Performed by: EMERGENCY MEDICINE

## 2020-05-22 PROCEDURE — 99291 CRITICAL CARE FIRST HOUR: CPT | Performed by: INTERNAL MEDICINE

## 2020-05-22 PROCEDURE — 63710000001 PROMETHAZINE PER 12.5 MG: Performed by: INTERNAL MEDICINE

## 2020-05-22 PROCEDURE — 99223 1ST HOSP IP/OBS HIGH 75: CPT | Performed by: INTERNAL MEDICINE

## 2020-05-22 PROCEDURE — 74177 CT ABD & PELVIS W/CONTRAST: CPT

## 2020-05-22 PROCEDURE — 36600 WITHDRAWAL OF ARTERIAL BLOOD: CPT

## 2020-05-22 PROCEDURE — 83735 ASSAY OF MAGNESIUM: CPT | Performed by: PHYSICIAN ASSISTANT

## 2020-05-22 PROCEDURE — 25010000002 LORAZEPAM PER 2 MG: Performed by: INTERNAL MEDICINE

## 2020-05-22 PROCEDURE — 71045 X-RAY EXAM CHEST 1 VIEW: CPT

## 2020-05-22 PROCEDURE — 82805 BLOOD GASES W/O2 SATURATION: CPT

## 2020-05-22 PROCEDURE — 81003 URINALYSIS AUTO W/O SCOPE: CPT | Performed by: EMERGENCY MEDICINE

## 2020-05-22 PROCEDURE — 25010000002 THIAMINE PER 100 MG: Performed by: PHYSICIAN ASSISTANT

## 2020-05-22 PROCEDURE — 84478 ASSAY OF TRIGLYCERIDES: CPT | Performed by: INTERNAL MEDICINE

## 2020-05-22 PROCEDURE — 80053 COMPREHEN METABOLIC PANEL: CPT | Performed by: EMERGENCY MEDICINE

## 2020-05-22 PROCEDURE — 83690 ASSAY OF LIPASE: CPT | Performed by: INTERNAL MEDICINE

## 2020-05-22 PROCEDURE — 85610 PROTHROMBIN TIME: CPT | Performed by: PHYSICIAN ASSISTANT

## 2020-05-22 PROCEDURE — 80074 ACUTE HEPATITIS PANEL: CPT | Performed by: PHYSICIAN ASSISTANT

## 2020-05-22 PROCEDURE — 83605 ASSAY OF LACTIC ACID: CPT | Performed by: INTERNAL MEDICINE

## 2020-05-22 PROCEDURE — 25010000002 ONDANSETRON PER 1 MG: Performed by: PHYSICIAN ASSISTANT

## 2020-05-22 PROCEDURE — 25010000002 CEFTRIAXONE PER 250 MG: Performed by: INTERNAL MEDICINE

## 2020-05-22 PROCEDURE — 85025 COMPLETE CBC W/AUTO DIFF WBC: CPT | Performed by: INTERNAL MEDICINE

## 2020-05-22 PROCEDURE — 80053 COMPREHEN METABOLIC PANEL: CPT | Performed by: INTERNAL MEDICINE

## 2020-05-22 PROCEDURE — 99284 EMERGENCY DEPT VISIT MOD MDM: CPT

## 2020-05-22 PROCEDURE — 87040 BLOOD CULTURE FOR BACTERIA: CPT | Performed by: INTERNAL MEDICINE

## 2020-05-22 PROCEDURE — 25010000002 DIAZEPAM PER 5 MG: Performed by: INTERNAL MEDICINE

## 2020-05-22 PROCEDURE — 83690 ASSAY OF LIPASE: CPT | Performed by: EMERGENCY MEDICINE

## 2020-05-22 PROCEDURE — 25010000002 IOPAMIDOL 61 % SOLUTION: Performed by: EMERGENCY MEDICINE

## 2020-05-22 PROCEDURE — 25010000002 HYDROMORPHONE PER 4 MG: Performed by: INTERNAL MEDICINE

## 2020-05-22 PROCEDURE — 25010000002 VITAMIN K1 PER 1 MG: Performed by: PHYSICIAN ASSISTANT

## 2020-05-22 PROCEDURE — 85730 THROMBOPLASTIN TIME PARTIAL: CPT | Performed by: PHYSICIAN ASSISTANT

## 2020-05-22 RX ORDER — FAMOTIDINE 20 MG/1
20 TABLET, FILM COATED ORAL 2 TIMES DAILY
COMMUNITY
End: 2020-10-20

## 2020-05-22 RX ORDER — LORAZEPAM 2 MG/ML
2 INJECTION INTRAMUSCULAR
Status: DISCONTINUED | OUTPATIENT
Start: 2020-05-22 | End: 2020-05-29

## 2020-05-22 RX ORDER — PANTOPRAZOLE SODIUM 40 MG/10ML
80 INJECTION, POWDER, LYOPHILIZED, FOR SOLUTION INTRAVENOUS ONCE
Status: COMPLETED | OUTPATIENT
Start: 2020-05-22 | End: 2020-05-22

## 2020-05-22 RX ORDER — CLONIDINE HYDROCHLORIDE 0.1 MG/1
0.1 TABLET ORAL 3 TIMES DAILY PRN
Status: DISCONTINUED | OUTPATIENT
Start: 2020-05-24 | End: 2020-05-22

## 2020-05-22 RX ORDER — CLONIDINE HYDROCHLORIDE 0.1 MG/1
0.1 TABLET ORAL ONCE AS NEEDED
Status: DISCONTINUED | OUTPATIENT
Start: 2020-05-26 | End: 2020-05-22

## 2020-05-22 RX ORDER — PROMETHAZINE HYDROCHLORIDE 25 MG/1
12.5 SUPPOSITORY RECTAL EVERY 6 HOURS PRN
Status: DISCONTINUED | OUTPATIENT
Start: 2020-05-22 | End: 2020-05-29 | Stop reason: HOSPADM

## 2020-05-22 RX ORDER — ZINC SULFATE 50(220)MG
220 CAPSULE ORAL 2 TIMES DAILY
Status: DISCONTINUED | OUTPATIENT
Start: 2020-05-22 | End: 2020-05-29 | Stop reason: HOSPADM

## 2020-05-22 RX ORDER — DIAZEPAM 5 MG/ML
10 INJECTION, SOLUTION INTRAMUSCULAR; INTRAVENOUS EVERY 8 HOURS
Status: DISCONTINUED | OUTPATIENT
Start: 2020-05-22 | End: 2020-05-23

## 2020-05-22 RX ORDER — LORAZEPAM 2 MG/ML
1 INJECTION INTRAMUSCULAR
Status: DISCONTINUED | OUTPATIENT
Start: 2020-05-22 | End: 2020-05-29

## 2020-05-22 RX ORDER — FOLIC ACID 5 MG/ML
1 INJECTION, SOLUTION INTRAMUSCULAR; INTRAVENOUS; SUBCUTANEOUS DAILY
Status: DISCONTINUED | OUTPATIENT
Start: 2020-05-23 | End: 2020-05-22

## 2020-05-22 RX ORDER — NICOTINE 21 MG/24HR
1 PATCH, TRANSDERMAL 24 HOURS TRANSDERMAL
Status: DISCONTINUED | OUTPATIENT
Start: 2020-05-22 | End: 2020-05-29 | Stop reason: HOSPADM

## 2020-05-22 RX ORDER — SODIUM CHLORIDE 0.9 % (FLUSH) 0.9 %
10 SYRINGE (ML) INJECTION AS NEEDED
Status: DISCONTINUED | OUTPATIENT
Start: 2020-05-22 | End: 2020-05-22 | Stop reason: SDUPTHER

## 2020-05-22 RX ORDER — SODIUM CHLORIDE 0.9 % (FLUSH) 0.9 %
10 SYRINGE (ML) INJECTION EVERY 12 HOURS SCHEDULED
Status: DISCONTINUED | OUTPATIENT
Start: 2020-05-22 | End: 2020-05-29

## 2020-05-22 RX ORDER — PROMETHAZINE HYDROCHLORIDE 25 MG/ML
12.5 INJECTION, SOLUTION INTRAMUSCULAR; INTRAVENOUS EVERY 6 HOURS PRN
Status: DISCONTINUED | OUTPATIENT
Start: 2020-05-22 | End: 2020-05-29

## 2020-05-22 RX ORDER — PROMETHAZINE HYDROCHLORIDE 12.5 MG/1
12.5 TABLET ORAL EVERY 6 HOURS PRN
Status: DISCONTINUED | OUTPATIENT
Start: 2020-05-22 | End: 2020-05-29 | Stop reason: HOSPADM

## 2020-05-22 RX ORDER — DEXMEDETOMIDINE HYDROCHLORIDE 4 UG/ML
.2-1.5 INJECTION, SOLUTION INTRAVENOUS
Status: DISCONTINUED | OUTPATIENT
Start: 2020-05-22 | End: 2020-05-24

## 2020-05-22 RX ORDER — SODIUM CHLORIDE, SODIUM LACTATE, POTASSIUM CHLORIDE, CALCIUM CHLORIDE 600; 310; 30; 20 MG/100ML; MG/100ML; MG/100ML; MG/100ML
200 INJECTION, SOLUTION INTRAVENOUS CONTINUOUS
Status: ACTIVE | OUTPATIENT
Start: 2020-05-22 | End: 2020-05-23

## 2020-05-22 RX ORDER — SODIUM CHLORIDE, SODIUM LACTATE, POTASSIUM CHLORIDE, CALCIUM CHLORIDE 600; 310; 30; 20 MG/100ML; MG/100ML; MG/100ML; MG/100ML
150 INJECTION, SOLUTION INTRAVENOUS CONTINUOUS
Status: DISCONTINUED | OUTPATIENT
Start: 2020-05-23 | End: 2020-05-23

## 2020-05-22 RX ORDER — CLONIDINE HYDROCHLORIDE 0.1 MG/1
0.1 TABLET ORAL 2 TIMES DAILY PRN
Status: DISCONTINUED | OUTPATIENT
Start: 2020-05-25 | End: 2020-05-22

## 2020-05-22 RX ORDER — THIAMINE HYDROCHLORIDE 100 MG/ML
500 INJECTION, SOLUTION INTRAMUSCULAR; INTRAVENOUS EVERY 8 HOURS
Status: DISCONTINUED | OUTPATIENT
Start: 2020-05-22 | End: 2020-05-22

## 2020-05-22 RX ORDER — BUPRENORPHINE HYDROCHLORIDE AND NALOXONE HYDROCHLORIDE DIHYDRATE 8; 2 MG/1; MG/1
1 TABLET SUBLINGUAL DAILY
Status: DISCONTINUED | OUTPATIENT
Start: 2020-05-22 | End: 2020-05-29 | Stop reason: HOSPADM

## 2020-05-22 RX ORDER — FAMOTIDINE 10 MG/ML
20 INJECTION, SOLUTION INTRAVENOUS ONCE
Status: COMPLETED | OUTPATIENT
Start: 2020-05-22 | End: 2020-05-22

## 2020-05-22 RX ORDER — DIAZEPAM 5 MG/ML
10 INJECTION, SOLUTION INTRAMUSCULAR; INTRAVENOUS ONCE
Status: COMPLETED | OUTPATIENT
Start: 2020-05-22 | End: 2020-05-22

## 2020-05-22 RX ORDER — BUPRENORPHINE HYDROCHLORIDE AND NALOXONE HYDROCHLORIDE DIHYDRATE 8; 2 MG/1; MG/1
1 TABLET SUBLINGUAL DAILY
COMMUNITY
End: 2020-10-26 | Stop reason: HOSPADM

## 2020-05-22 RX ORDER — HYDROMORPHONE HYDROCHLORIDE 1 MG/ML
0.5 INJECTION, SOLUTION INTRAMUSCULAR; INTRAVENOUS; SUBCUTANEOUS ONCE
Status: COMPLETED | OUTPATIENT
Start: 2020-05-22 | End: 2020-05-22

## 2020-05-22 RX ORDER — CLONIDINE HYDROCHLORIDE 0.1 MG/1
0.1 TABLET ORAL 4 TIMES DAILY PRN
Status: DISCONTINUED | OUTPATIENT
Start: 2020-05-23 | End: 2020-05-22

## 2020-05-22 RX ORDER — SODIUM CHLORIDE 0.9 % (FLUSH) 0.9 %
10 SYRINGE (ML) INJECTION AS NEEDED
Status: DISCONTINUED | OUTPATIENT
Start: 2020-05-22 | End: 2020-05-29 | Stop reason: HOSPADM

## 2020-05-22 RX ORDER — BISACODYL 5 MG/1
5 TABLET, DELAYED RELEASE ORAL DAILY PRN
Status: DISCONTINUED | OUTPATIENT
Start: 2020-05-22 | End: 2020-05-29 | Stop reason: HOSPADM

## 2020-05-22 RX ORDER — LORAZEPAM 1 MG/1
1 TABLET ORAL
Status: DISCONTINUED | OUTPATIENT
Start: 2020-05-22 | End: 2020-05-29 | Stop reason: HOSPADM

## 2020-05-22 RX ORDER — CLONIDINE HYDROCHLORIDE 0.1 MG/1
0.1 TABLET ORAL 4 TIMES DAILY PRN
Status: DISCONTINUED | OUTPATIENT
Start: 2020-05-22 | End: 2020-05-22

## 2020-05-22 RX ORDER — PANTOPRAZOLE SODIUM 40 MG/10ML
40 INJECTION, POWDER, LYOPHILIZED, FOR SOLUTION INTRAVENOUS
Status: DISCONTINUED | OUTPATIENT
Start: 2020-05-23 | End: 2020-05-23

## 2020-05-22 RX ORDER — ONDANSETRON 2 MG/ML
4 INJECTION INTRAMUSCULAR; INTRAVENOUS ONCE
Status: COMPLETED | OUTPATIENT
Start: 2020-05-22 | End: 2020-05-22

## 2020-05-22 RX ORDER — POTASSIUM CHLORIDE 750 MG/1
40 CAPSULE, EXTENDED RELEASE ORAL ONCE
Status: COMPLETED | OUTPATIENT
Start: 2020-05-22 | End: 2020-05-22

## 2020-05-22 RX ORDER — LORAZEPAM 1 MG/1
2 TABLET ORAL
Status: DISCONTINUED | OUTPATIENT
Start: 2020-05-22 | End: 2020-05-29 | Stop reason: HOSPADM

## 2020-05-22 RX ADMIN — IOPAMIDOL 95 ML: 612 INJECTION, SOLUTION INTRAVENOUS at 10:56

## 2020-05-22 RX ADMIN — HYDROMORPHONE HYDROCHLORIDE 0.5 MG: 1 INJECTION, SOLUTION INTRAMUSCULAR; INTRAVENOUS; SUBCUTANEOUS at 20:58

## 2020-05-22 RX ADMIN — SODIUM CHLORIDE 1000 ML: 9 INJECTION, SOLUTION INTRAVENOUS at 11:52

## 2020-05-22 RX ADMIN — SODIUM CHLORIDE, POTASSIUM CHLORIDE, SODIUM LACTATE AND CALCIUM CHLORIDE 150 ML/HR: 600; 310; 30; 20 INJECTION, SOLUTION INTRAVENOUS at 14:11

## 2020-05-22 RX ADMIN — POTASSIUM CHLORIDE 40 MEQ: 10 CAPSULE, COATED, EXTENDED RELEASE ORAL at 14:11

## 2020-05-22 RX ADMIN — PANTOPRAZOLE SODIUM 80 MG: 40 INJECTION, POWDER, FOR SOLUTION INTRAVENOUS at 09:35

## 2020-05-22 RX ADMIN — SODIUM CHLORIDE, PRESERVATIVE FREE 10 ML: 5 INJECTION INTRAVENOUS at 21:31

## 2020-05-22 RX ADMIN — ONDANSETRON 4 MG: 2 INJECTION INTRAMUSCULAR; INTRAVENOUS at 09:36

## 2020-05-22 RX ADMIN — LORAZEPAM 2 MG: 2 INJECTION INTRAMUSCULAR; INTRAVENOUS at 21:14

## 2020-05-22 RX ADMIN — SODIUM CHLORIDE 2000 ML: 9 INJECTION, SOLUTION INTRAVENOUS at 09:36

## 2020-05-22 RX ADMIN — LORAZEPAM 2 MG: 2 INJECTION INTRAMUSCULAR; INTRAVENOUS at 20:21

## 2020-05-22 RX ADMIN — LORAZEPAM 2 MG: 2 INJECTION INTRAMUSCULAR; INTRAVENOUS at 20:58

## 2020-05-22 RX ADMIN — FAMOTIDINE 20 MG: 10 INJECTION, SOLUTION INTRAVENOUS at 09:36

## 2020-05-22 RX ADMIN — DEXMEDETOMIDINE HYDROCHLORIDE 0.2 MCG/KG/HR: 4 INJECTION, SOLUTION INTRAVENOUS at 22:26

## 2020-05-22 RX ADMIN — PROMETHAZINE HYDROCHLORIDE 12.5 MG: 12.5 TABLET ORAL at 14:02

## 2020-05-22 RX ADMIN — PHYTONADIONE 10 MG: 10 INJECTION, EMULSION INTRAMUSCULAR; INTRAVENOUS; SUBCUTANEOUS at 16:04

## 2020-05-22 RX ADMIN — LORAZEPAM 2 MG: 2 INJECTION INTRAMUSCULAR; INTRAVENOUS at 20:40

## 2020-05-22 RX ADMIN — LORAZEPAM 2 MG: 2 INJECTION INTRAMUSCULAR; INTRAVENOUS at 21:30

## 2020-05-22 RX ADMIN — LORAZEPAM 2 MG: 2 INJECTION INTRAMUSCULAR; INTRAVENOUS at 19:06

## 2020-05-22 RX ADMIN — DIAZEPAM 10 MG: 5 INJECTION, SOLUTION INTRAMUSCULAR; INTRAVENOUS at 22:39

## 2020-05-22 RX ADMIN — LORAZEPAM 2 MG: 2 INJECTION INTRAMUSCULAR; INTRAVENOUS at 20:06

## 2020-05-22 RX ADMIN — NICOTINE 1 PATCH: 21 PATCH TRANSDERMAL at 14:11

## 2020-05-22 RX ADMIN — CEFTRIAXONE 1 G: 1 INJECTION, POWDER, FOR SOLUTION INTRAMUSCULAR; INTRAVENOUS at 23:08

## 2020-05-22 RX ADMIN — SODIUM CHLORIDE, PRESERVATIVE FREE 10 ML: 5 INJECTION INTRAVENOUS at 14:29

## 2020-05-22 RX ADMIN — LORAZEPAM 2 MG: 2 INJECTION INTRAMUSCULAR; INTRAVENOUS at 21:47

## 2020-05-22 RX ADMIN — SODIUM CHLORIDE, POTASSIUM CHLORIDE, SODIUM LACTATE AND CALCIUM CHLORIDE 1000 ML: 600; 310; 30; 20 INJECTION, SOLUTION INTRAVENOUS at 21:27

## 2020-05-22 RX ADMIN — BUPRENORPHINE AND NALOXONE 1 TABLET: 8; 2 TABLET SUBLINGUAL at 20:41

## 2020-05-22 RX ADMIN — ZINC SULFATE 220 MG (50 MG) CAPSULE 220 MG: CAPSULE at 20:41

## 2020-05-22 RX ADMIN — LORAZEPAM 2 MG: 2 INJECTION INTRAMUSCULAR; INTRAVENOUS at 14:02

## 2020-05-22 RX ADMIN — DIAZEPAM 10 MG: 5 INJECTION, SOLUTION INTRAMUSCULAR; INTRAVENOUS at 21:14

## 2020-05-22 RX ADMIN — LORAZEPAM 2 MG: 2 INJECTION INTRAMUSCULAR; INTRAVENOUS at 22:25

## 2020-05-22 RX ADMIN — THIAMINE HYDROCHLORIDE 100 MG: 100 INJECTION, SOLUTION INTRAMUSCULAR; INTRAVENOUS at 10:27

## 2020-05-22 RX ADMIN — LORAZEPAM 1 MG: 2 INJECTION INTRAMUSCULAR; INTRAVENOUS at 18:04

## 2020-05-22 RX ADMIN — LORAZEPAM 2 MG: 2 INJECTION INTRAMUSCULAR; INTRAVENOUS at 19:51

## 2020-05-23 PROBLEM — F10.931 DELIRIUM TREMENS (HCC): Status: ACTIVE | Noted: 2020-05-23

## 2020-05-23 PROBLEM — R79.89 ELEVATED LFTS: Status: RESOLVED | Noted: 2020-01-15 | Resolved: 2020-05-23

## 2020-05-23 LAB
ALBUMIN SERPL-MCNC: 2.4 G/DL (ref 3.5–5.2)
ALBUMIN/GLOB SERPL: 1 G/DL
ALP SERPL-CCNC: 318 U/L (ref 39–117)
ALT SERPL W P-5'-P-CCNC: 79 U/L (ref 1–41)
ANION GAP SERPL CALCULATED.3IONS-SCNC: 13 MMOL/L (ref 5–15)
ARTERIAL PATENCY WRIST A: ABNORMAL
AST SERPL-CCNC: 343 U/L (ref 1–40)
ATMOSPHERIC PRESS: ABNORMAL MM[HG]
BASE EXCESS BLDA CALC-SCNC: 2.3 MMOL/L (ref 0–2)
BASOPHILS # BLD AUTO: 0.07 10*3/MM3 (ref 0–0.2)
BASOPHILS NFR BLD AUTO: 0.9 % (ref 0–1.5)
BDY SITE: ABNORMAL
BILIRUB SERPL-MCNC: 5.9 MG/DL (ref 0.2–1.2)
BODY TEMPERATURE: 37 C
BUN BLD-MCNC: 2 MG/DL (ref 6–20)
BUN/CREAT SERPL: 4.4 (ref 7–25)
CALCIUM SPEC-SCNC: 7.5 MG/DL (ref 8.6–10.5)
CHLORIDE SERPL-SCNC: 102 MMOL/L (ref 98–107)
CO2 BLDA-SCNC: 27.6 MMOL/L (ref 22–33)
CO2 SERPL-SCNC: 22 MMOL/L (ref 22–29)
COHGB MFR BLD: 1.7 % (ref 0–2)
CREAT BLD-MCNC: 0.45 MG/DL (ref 0.76–1.27)
D-LACTATE SERPL-SCNC: 2.1 MMOL/L (ref 0.5–2)
D-LACTATE SERPL-SCNC: 3.7 MMOL/L (ref 0.5–2)
D-LACTATE SERPL-SCNC: 4.4 MMOL/L (ref 0.5–2)
DEPRECATED RDW RBC AUTO: 72 FL (ref 37–54)
EOSINOPHIL # BLD AUTO: 0.07 10*3/MM3 (ref 0–0.4)
EOSINOPHIL NFR BLD AUTO: 0.9 % (ref 0.3–6.2)
ERYTHROCYTE [DISTWIDTH] IN BLOOD BY AUTOMATED COUNT: 19.9 % (ref 12.3–15.4)
GFR SERPL CREATININE-BSD FRML MDRD: >150 ML/MIN/1.73
GLOBULIN UR ELPH-MCNC: 2.5 GM/DL
GLUCOSE BLD-MCNC: 88 MG/DL (ref 65–99)
HCO3 BLDA-SCNC: 26.4 MMOL/L (ref 20–26)
HCT VFR BLD AUTO: 33.5 % (ref 37.5–51)
HCT VFR BLD CALC: 35.4 %
HGB BLD-MCNC: 11.5 G/DL (ref 13–17.7)
HGB BLDA-MCNC: 11.6 G/DL (ref 13.5–17.5)
HOROWITZ INDEX BLD+IHG-RTO: 32 %
IMM GRANULOCYTES # BLD AUTO: 0.05 10*3/MM3 (ref 0–0.05)
IMM GRANULOCYTES NFR BLD AUTO: 0.6 % (ref 0–0.5)
INR PPP: 1.46 (ref 0.85–1.16)
LIPASE SERPL-CCNC: 235 U/L (ref 13–60)
LYMPHOCYTES # BLD AUTO: 1.17 10*3/MM3 (ref 0.7–3.1)
LYMPHOCYTES NFR BLD AUTO: 15 % (ref 19.6–45.3)
MAGNESIUM SERPL-MCNC: 1.4 MG/DL (ref 1.6–2.6)
MCH RBC QN AUTO: 34.6 PG (ref 26.6–33)
MCHC RBC AUTO-ENTMCNC: 34.3 G/DL (ref 31.5–35.7)
MCV RBC AUTO: 100.9 FL (ref 79–97)
METHGB BLD QL: 0.5 % (ref 0–1.5)
MODALITY: ABNORMAL
MONOCYTES # BLD AUTO: 0.71 10*3/MM3 (ref 0.1–0.9)
MONOCYTES NFR BLD AUTO: 9.1 % (ref 5–12)
NEUTROPHILS # BLD AUTO: 5.72 10*3/MM3 (ref 1.7–7)
NEUTROPHILS NFR BLD AUTO: 73.5 % (ref 42.7–76)
NOTE: ABNORMAL
NRBC BLD AUTO-RTO: 0 /100 WBC (ref 0–0.2)
OXYHGB MFR BLDV: 87.5 % (ref 94–99)
PCO2 BLDA: 38.2 MM HG (ref 35–45)
PCO2 TEMP ADJ BLD: 38.2 MM HG (ref 35–48)
PH BLDA: 7.45 PH UNITS (ref 7.35–7.45)
PH, TEMP CORRECTED: 7.45 PH UNITS
PLATELET # BLD AUTO: 91 10*3/MM3 (ref 140–450)
PMV BLD AUTO: 12.7 FL (ref 6–12)
PO2 BLDA: 55.3 MM HG (ref 83–108)
PO2 TEMP ADJ BLD: 55.3 MM HG (ref 83–108)
POTASSIUM BLD-SCNC: 3.9 MMOL/L (ref 3.5–5.2)
PROT SERPL-MCNC: 4.9 G/DL (ref 6–8.5)
PROTHROMBIN TIME: 17.4 SECONDS (ref 11.5–14)
RBC # BLD AUTO: 3.32 10*6/MM3 (ref 4.14–5.8)
SODIUM BLD-SCNC: 137 MMOL/L (ref 136–145)
VENTILATOR MODE: ABNORMAL
WBC NRBC COR # BLD: 7.79 10*3/MM3 (ref 3.4–10.8)

## 2020-05-23 PROCEDURE — 82805 BLOOD GASES W/O2 SATURATION: CPT

## 2020-05-23 PROCEDURE — 80053 COMPREHEN METABOLIC PANEL: CPT | Performed by: INTERNAL MEDICINE

## 2020-05-23 PROCEDURE — 83605 ASSAY OF LACTIC ACID: CPT | Performed by: INTERNAL MEDICINE

## 2020-05-23 PROCEDURE — 83690 ASSAY OF LIPASE: CPT | Performed by: INTERNAL MEDICINE

## 2020-05-23 PROCEDURE — 25010000002 MAGNESIUM SULFATE 2 GM/50ML SOLUTION: Performed by: NURSE PRACTITIONER

## 2020-05-23 PROCEDURE — 25010000002 CEFTRIAXONE PER 250 MG: Performed by: INTERNAL MEDICINE

## 2020-05-23 PROCEDURE — 25010000002 DIAZEPAM PER 5 MG: Performed by: INTERNAL MEDICINE

## 2020-05-23 PROCEDURE — 85610 PROTHROMBIN TIME: CPT | Performed by: INTERNAL MEDICINE

## 2020-05-23 PROCEDURE — 25010000002 VANCOMYCIN 10 G RECONSTITUTED SOLUTION

## 2020-05-23 PROCEDURE — 83735 ASSAY OF MAGNESIUM: CPT | Performed by: INTERNAL MEDICINE

## 2020-05-23 PROCEDURE — 25010000002 HYDROMORPHONE PER 4 MG: Performed by: NURSE PRACTITIONER

## 2020-05-23 PROCEDURE — 25010000002 LORAZEPAM PER 2 MG: Performed by: INTERNAL MEDICINE

## 2020-05-23 PROCEDURE — 94799 UNLISTED PULMONARY SVC/PX: CPT

## 2020-05-23 PROCEDURE — 36600 WITHDRAWAL OF ARTERIAL BLOOD: CPT

## 2020-05-23 PROCEDURE — 94660 CPAP INITIATION&MGMT: CPT

## 2020-05-23 PROCEDURE — 99233 SBSQ HOSP IP/OBS HIGH 50: CPT | Performed by: INTERNAL MEDICINE

## 2020-05-23 PROCEDURE — 85025 COMPLETE CBC W/AUTO DIFF WBC: CPT | Performed by: PHYSICIAN ASSISTANT

## 2020-05-23 PROCEDURE — 25010000003 MAGNESIUM SULFATE 4 GM/100ML SOLUTION: Performed by: NURSE PRACTITIONER

## 2020-05-23 RX ORDER — MAGNESIUM SULFATE HEPTAHYDRATE 40 MG/ML
4 INJECTION, SOLUTION INTRAVENOUS AS NEEDED
Status: DISCONTINUED | OUTPATIENT
Start: 2020-05-23 | End: 2020-05-29 | Stop reason: HOSPADM

## 2020-05-23 RX ORDER — HYDROMORPHONE HYDROCHLORIDE 1 MG/ML
0.5 INJECTION, SOLUTION INTRAMUSCULAR; INTRAVENOUS; SUBCUTANEOUS ONCE
Status: COMPLETED | OUTPATIENT
Start: 2020-05-23 | End: 2020-05-23

## 2020-05-23 RX ORDER — FOLIC ACID 1 MG/1
1 TABLET ORAL DAILY
Status: DISCONTINUED | OUTPATIENT
Start: 2020-05-24 | End: 2020-05-29 | Stop reason: HOSPADM

## 2020-05-23 RX ORDER — MAGNESIUM SULFATE HEPTAHYDRATE 40 MG/ML
2 INJECTION, SOLUTION INTRAVENOUS AS NEEDED
Status: DISCONTINUED | OUTPATIENT
Start: 2020-05-23 | End: 2020-05-29 | Stop reason: HOSPADM

## 2020-05-23 RX ORDER — THIAMINE MONONITRATE (VIT B1) 100 MG
100 TABLET ORAL DAILY
Status: DISCONTINUED | OUTPATIENT
Start: 2020-05-23 | End: 2020-05-29 | Stop reason: HOSPADM

## 2020-05-23 RX ORDER — SODIUM CHLORIDE, SODIUM LACTATE, POTASSIUM CHLORIDE, CALCIUM CHLORIDE 600; 310; 30; 20 MG/100ML; MG/100ML; MG/100ML; MG/100ML
200 INJECTION, SOLUTION INTRAVENOUS CONTINUOUS
Status: DISCONTINUED | OUTPATIENT
Start: 2020-05-23 | End: 2020-05-24

## 2020-05-23 RX ORDER — DIAZEPAM 5 MG/1
10 TABLET ORAL EVERY 8 HOURS SCHEDULED
Status: DISCONTINUED | OUTPATIENT
Start: 2020-05-23 | End: 2020-05-29 | Stop reason: HOSPADM

## 2020-05-23 RX ORDER — PANTOPRAZOLE SODIUM 40 MG/1
40 TABLET, DELAYED RELEASE ORAL
Status: DISCONTINUED | OUTPATIENT
Start: 2020-05-24 | End: 2020-05-29 | Stop reason: HOSPADM

## 2020-05-23 RX ADMIN — LORAZEPAM 2 MG: 1 TABLET ORAL at 15:54

## 2020-05-23 RX ADMIN — LORAZEPAM 2 MG: 2 INJECTION INTRAMUSCULAR; INTRAVENOUS at 19:52

## 2020-05-23 RX ADMIN — ZINC SULFATE 220 MG (50 MG) CAPSULE 220 MG: CAPSULE at 21:40

## 2020-05-23 RX ADMIN — SODIUM CHLORIDE, POTASSIUM CHLORIDE, SODIUM LACTATE AND CALCIUM CHLORIDE 200 ML/HR: 600; 310; 30; 20 INJECTION, SOLUTION INTRAVENOUS at 20:00

## 2020-05-23 RX ADMIN — FOLIC ACID 1 MG: 5 INJECTION, SOLUTION INTRAMUSCULAR; INTRAVENOUS; SUBCUTANEOUS at 08:10

## 2020-05-23 RX ADMIN — BUPRENORPHINE AND NALOXONE 1 TABLET: 8; 2 TABLET SUBLINGUAL at 21:40

## 2020-05-23 RX ADMIN — LORAZEPAM 2 MG: 1 TABLET ORAL at 12:20

## 2020-05-23 RX ADMIN — MAGNESIUM SULFATE IN WATER 2 G: 40 INJECTION, SOLUTION INTRAVENOUS at 06:39

## 2020-05-23 RX ADMIN — DIAZEPAM 10 MG: 5 TABLET ORAL at 21:40

## 2020-05-23 RX ADMIN — SODIUM CHLORIDE, POTASSIUM CHLORIDE, SODIUM LACTATE AND CALCIUM CHLORIDE 150 ML/HR: 600; 310; 30; 20 INJECTION, SOLUTION INTRAVENOUS at 08:10

## 2020-05-23 RX ADMIN — LORAZEPAM 2 MG: 2 INJECTION INTRAMUSCULAR; INTRAVENOUS at 22:59

## 2020-05-23 RX ADMIN — PANTOPRAZOLE SODIUM 40 MG: 40 INJECTION, POWDER, FOR SOLUTION INTRAVENOUS at 05:11

## 2020-05-23 RX ADMIN — HYDROMORPHONE HYDROCHLORIDE 0.5 MG: 1 INJECTION, SOLUTION INTRAMUSCULAR; INTRAVENOUS; SUBCUTANEOUS at 23:44

## 2020-05-23 RX ADMIN — CEFTRIAXONE 1 G: 1 INJECTION, POWDER, FOR SOLUTION INTRAMUSCULAR; INTRAVENOUS at 21:40

## 2020-05-23 RX ADMIN — SODIUM CHLORIDE, POTASSIUM CHLORIDE, SODIUM LACTATE AND CALCIUM CHLORIDE 200 ML/HR: 600; 310; 30; 20 INJECTION, SOLUTION INTRAVENOUS at 03:52

## 2020-05-23 RX ADMIN — NICOTINE 1 PATCH: 21 PATCH TRANSDERMAL at 08:12

## 2020-05-23 RX ADMIN — DIAZEPAM 10 MG: 5 TABLET ORAL at 14:02

## 2020-05-23 RX ADMIN — VANCOMYCIN HYDROCHLORIDE 1250 MG: 10 INJECTION, POWDER, LYOPHILIZED, FOR SOLUTION INTRAVENOUS at 11:01

## 2020-05-23 RX ADMIN — VANCOMYCIN HYDROCHLORIDE 1250 MG: 10 INJECTION, POWDER, LYOPHILIZED, FOR SOLUTION INTRAVENOUS at 23:01

## 2020-05-23 RX ADMIN — SODIUM CHLORIDE, POTASSIUM CHLORIDE, SODIUM LACTATE AND CALCIUM CHLORIDE 1000 ML: 600; 310; 30; 20 INJECTION, SOLUTION INTRAVENOUS at 15:54

## 2020-05-23 RX ADMIN — SODIUM CHLORIDE, PRESERVATIVE FREE 10 ML: 5 INJECTION INTRAVENOUS at 21:41

## 2020-05-23 RX ADMIN — SODIUM CHLORIDE, POTASSIUM CHLORIDE, SODIUM LACTATE AND CALCIUM CHLORIDE 200 ML/HR: 600; 310; 30; 20 INJECTION, SOLUTION INTRAVENOUS at 14:15

## 2020-05-23 RX ADMIN — LORAZEPAM 2 MG: 1 TABLET ORAL at 08:32

## 2020-05-23 RX ADMIN — DIAZEPAM 10 MG: 5 INJECTION, SOLUTION INTRAMUSCULAR; INTRAVENOUS at 05:11

## 2020-05-23 RX ADMIN — ZINC SULFATE 220 MG (50 MG) CAPSULE 220 MG: CAPSULE at 08:10

## 2020-05-23 RX ADMIN — THIAMINE HCL TAB 100 MG 100 MG: 100 TAB at 08:25

## 2020-05-23 RX ADMIN — MAGNESIUM SULFATE HEPTAHYDRATE 4 G: 40 INJECTION, SOLUTION INTRAVENOUS at 08:10

## 2020-05-23 RX ADMIN — SODIUM CHLORIDE 1750 MG: 9 INJECTION, SOLUTION INTRAVENOUS at 00:19

## 2020-05-24 LAB
ALBUMIN SERPL-MCNC: 2.3 G/DL (ref 3.5–5.2)
ALBUMIN/GLOB SERPL: 0.8 G/DL
ALP SERPL-CCNC: 346 U/L (ref 39–117)
ALT SERPL W P-5'-P-CCNC: 77 U/L (ref 1–41)
AMYLASE SERPL-CCNC: 103 U/L (ref 28–100)
ANION GAP SERPL CALCULATED.3IONS-SCNC: 13 MMOL/L (ref 5–15)
APTT PPP: 38.9 SECONDS (ref 24–37)
AST SERPL-CCNC: 297 U/L (ref 1–40)
BASOPHILS # BLD AUTO: 0.1 10*3/MM3 (ref 0–0.2)
BASOPHILS NFR BLD AUTO: 1 % (ref 0–1.5)
BILIRUB SERPL-MCNC: 7.8 MG/DL (ref 0.2–1.2)
BUN BLD-MCNC: <2 MG/DL (ref 6–20)
BUN/CREAT SERPL: ABNORMAL
CALCIUM SPEC-SCNC: 8 MG/DL (ref 8.6–10.5)
CHLORIDE SERPL-SCNC: 102 MMOL/L (ref 98–107)
CO2 SERPL-SCNC: 21 MMOL/L (ref 22–29)
CREAT BLD-MCNC: 0.5 MG/DL (ref 0.76–1.27)
D-LACTATE SERPL-SCNC: 2.3 MMOL/L (ref 0.5–2)
D-LACTATE SERPL-SCNC: 2.5 MMOL/L (ref 0.5–2)
DEPRECATED RDW RBC AUTO: 73.8 FL (ref 37–54)
EOSINOPHIL # BLD AUTO: 0.22 10*3/MM3 (ref 0–0.4)
EOSINOPHIL NFR BLD AUTO: 2.2 % (ref 0.3–6.2)
ERYTHROCYTE [DISTWIDTH] IN BLOOD BY AUTOMATED COUNT: 19.9 % (ref 12.3–15.4)
GFR SERPL CREATININE-BSD FRML MDRD: >150 ML/MIN/1.73
GLOBULIN UR ELPH-MCNC: 2.9 GM/DL
GLUCOSE BLD-MCNC: 66 MG/DL (ref 65–99)
HCT VFR BLD AUTO: 40.1 % (ref 37.5–51)
HGB BLD-MCNC: 13.6 G/DL (ref 13–17.7)
IMM GRANULOCYTES # BLD AUTO: 0.05 10*3/MM3 (ref 0–0.05)
IMM GRANULOCYTES NFR BLD AUTO: 0.5 % (ref 0–0.5)
INR PPP: 1.4 (ref 0.85–1.16)
LIPASE SERPL-CCNC: 82 U/L (ref 13–60)
LYMPHOCYTES # BLD AUTO: 1.51 10*3/MM3 (ref 0.7–3.1)
LYMPHOCYTES NFR BLD AUTO: 15.1 % (ref 19.6–45.3)
MAGNESIUM SERPL-MCNC: 1.9 MG/DL (ref 1.6–2.6)
MCH RBC QN AUTO: 34.5 PG (ref 26.6–33)
MCHC RBC AUTO-ENTMCNC: 33.9 G/DL (ref 31.5–35.7)
MCV RBC AUTO: 101.8 FL (ref 79–97)
MONOCYTES # BLD AUTO: 0.89 10*3/MM3 (ref 0.1–0.9)
MONOCYTES NFR BLD AUTO: 8.9 % (ref 5–12)
NEUTROPHILS # BLD AUTO: 7.25 10*3/MM3 (ref 1.7–7)
NEUTROPHILS NFR BLD AUTO: 72.3 % (ref 42.7–76)
NRBC BLD AUTO-RTO: 0 /100 WBC (ref 0–0.2)
PHOSPHATE SERPL-MCNC: 1.9 MG/DL (ref 2.5–4.5)
PLATELET # BLD AUTO: 75 10*3/MM3 (ref 140–450)
PMV BLD AUTO: 13.2 FL (ref 6–12)
POTASSIUM BLD-SCNC: 3.9 MMOL/L (ref 3.5–5.2)
PROCALCITONIN SERPL-MCNC: 0.87 NG/ML (ref 0.1–0.25)
PROT SERPL-MCNC: 5.2 G/DL (ref 6–8.5)
PROTHROMBIN TIME: 16.8 SECONDS (ref 11.5–14)
RBC # BLD AUTO: 3.94 10*6/MM3 (ref 4.14–5.8)
SODIUM BLD-SCNC: 136 MMOL/L (ref 136–145)
VANCOMYCIN TROUGH SERPL-MCNC: 8.9 MCG/ML (ref 5–20)
WBC NRBC COR # BLD: 10.02 10*3/MM3 (ref 3.4–10.8)

## 2020-05-24 PROCEDURE — 80053 COMPREHEN METABOLIC PANEL: CPT | Performed by: INTERNAL MEDICINE

## 2020-05-24 PROCEDURE — 83735 ASSAY OF MAGNESIUM: CPT | Performed by: INTERNAL MEDICINE

## 2020-05-24 PROCEDURE — 85025 COMPLETE CBC W/AUTO DIFF WBC: CPT | Performed by: INTERNAL MEDICINE

## 2020-05-24 PROCEDURE — 83690 ASSAY OF LIPASE: CPT | Performed by: INTERNAL MEDICINE

## 2020-05-24 PROCEDURE — 84145 PROCALCITONIN (PCT): CPT | Performed by: INTERNAL MEDICINE

## 2020-05-24 PROCEDURE — 80202 ASSAY OF VANCOMYCIN: CPT

## 2020-05-24 PROCEDURE — 82150 ASSAY OF AMYLASE: CPT | Performed by: INTERNAL MEDICINE

## 2020-05-24 PROCEDURE — 84100 ASSAY OF PHOSPHORUS: CPT | Performed by: INTERNAL MEDICINE

## 2020-05-24 PROCEDURE — 63710000001 PROMETHAZINE PER 12.5 MG: Performed by: INTERNAL MEDICINE

## 2020-05-24 PROCEDURE — 83605 ASSAY OF LACTIC ACID: CPT | Performed by: INTERNAL MEDICINE

## 2020-05-24 PROCEDURE — 25010000002 CEFTRIAXONE PER 250 MG: Performed by: INTERNAL MEDICINE

## 2020-05-24 PROCEDURE — 85730 THROMBOPLASTIN TIME PARTIAL: CPT | Performed by: INTERNAL MEDICINE

## 2020-05-24 PROCEDURE — 99232 SBSQ HOSP IP/OBS MODERATE 35: CPT | Performed by: INTERNAL MEDICINE

## 2020-05-24 PROCEDURE — 25010000002 LORAZEPAM PER 2 MG: Performed by: INTERNAL MEDICINE

## 2020-05-24 PROCEDURE — 85610 PROTHROMBIN TIME: CPT | Performed by: INTERNAL MEDICINE

## 2020-05-24 PROCEDURE — 99232 SBSQ HOSP IP/OBS MODERATE 35: CPT | Performed by: NURSE PRACTITIONER

## 2020-05-24 RX ORDER — ACETAMINOPHEN 325 MG/1
650 TABLET ORAL ONCE
Status: COMPLETED | OUTPATIENT
Start: 2020-05-24 | End: 2020-05-24

## 2020-05-24 RX ADMIN — DIAZEPAM 10 MG: 5 TABLET ORAL at 14:19

## 2020-05-24 RX ADMIN — LORAZEPAM 1 MG: 1 TABLET ORAL at 14:20

## 2020-05-24 RX ADMIN — PROMETHAZINE HYDROCHLORIDE 12.5 MG: 12.5 TABLET ORAL at 00:03

## 2020-05-24 RX ADMIN — THIAMINE HCL TAB 100 MG 100 MG: 100 TAB at 08:08

## 2020-05-24 RX ADMIN — SODIUM CHLORIDE, PRESERVATIVE FREE 10 ML: 5 INJECTION INTRAVENOUS at 08:11

## 2020-05-24 RX ADMIN — NICOTINE 1 PATCH: 21 PATCH TRANSDERMAL at 08:10

## 2020-05-24 RX ADMIN — LORAZEPAM 1 MG: 1 TABLET ORAL at 15:50

## 2020-05-24 RX ADMIN — ACETAMINOPHEN 650 MG: 325 TABLET ORAL at 01:44

## 2020-05-24 RX ADMIN — LORAZEPAM 2 MG: 2 INJECTION INTRAMUSCULAR; INTRAVENOUS at 02:40

## 2020-05-24 RX ADMIN — LORAZEPAM 1 MG: 1 TABLET ORAL at 20:00

## 2020-05-24 RX ADMIN — ZINC SULFATE 220 MG (50 MG) CAPSULE 220 MG: CAPSULE at 08:09

## 2020-05-24 RX ADMIN — BUPRENORPHINE AND NALOXONE 1 TABLET: 8; 2 TABLET SUBLINGUAL at 20:00

## 2020-05-24 RX ADMIN — SODIUM CHLORIDE, POTASSIUM CHLORIDE, SODIUM LACTATE AND CALCIUM CHLORIDE 200 ML/HR: 600; 310; 30; 20 INJECTION, SOLUTION INTRAVENOUS at 01:11

## 2020-05-24 RX ADMIN — LORAZEPAM 2 MG: 1 TABLET ORAL at 03:56

## 2020-05-24 RX ADMIN — LORAZEPAM 1 MG: 1 TABLET ORAL at 22:42

## 2020-05-24 RX ADMIN — CEFTRIAXONE 1 G: 1 INJECTION, POWDER, FOR SOLUTION INTRAMUSCULAR; INTRAVENOUS at 20:00

## 2020-05-24 RX ADMIN — FOLIC ACID 1 MG: 1 TABLET ORAL at 08:08

## 2020-05-24 RX ADMIN — POTASSIUM & SODIUM PHOSPHATES POWDER PACK 280-160-250 MG 2 PACKET: 280-160-250 PACK at 15:51

## 2020-05-24 RX ADMIN — SODIUM CHLORIDE, POTASSIUM CHLORIDE, SODIUM LACTATE AND CALCIUM CHLORIDE 200 ML/HR: 600; 310; 30; 20 INJECTION, SOLUTION INTRAVENOUS at 05:55

## 2020-05-24 RX ADMIN — LORAZEPAM 2 MG: 1 TABLET ORAL at 12:18

## 2020-05-24 RX ADMIN — ZINC SULFATE 220 MG (50 MG) CAPSULE 220 MG: CAPSULE at 20:00

## 2020-05-24 RX ADMIN — LORAZEPAM 2 MG: 1 TABLET ORAL at 01:02

## 2020-05-24 RX ADMIN — LORAZEPAM 2 MG: 1 TABLET ORAL at 08:08

## 2020-05-24 RX ADMIN — DIAZEPAM 10 MG: 5 TABLET ORAL at 22:36

## 2020-05-24 RX ADMIN — PANTOPRAZOLE SODIUM 40 MG: 40 TABLET, DELAYED RELEASE ORAL at 05:50

## 2020-05-24 RX ADMIN — LORAZEPAM 1 MG: 1 TABLET ORAL at 18:07

## 2020-05-24 RX ADMIN — DIAZEPAM 10 MG: 5 TABLET ORAL at 05:50

## 2020-05-25 ENCOUNTER — APPOINTMENT (OUTPATIENT)
Dept: GENERAL RADIOLOGY | Facility: HOSPITAL | Age: 30
End: 2020-05-25

## 2020-05-25 LAB
ALBUMIN SERPL-MCNC: 2.7 G/DL (ref 3.5–5.2)
ALBUMIN/GLOB SERPL: 0.8 G/DL
ALP SERPL-CCNC: 348 U/L (ref 39–117)
ALT SERPL W P-5'-P-CCNC: 75 U/L (ref 1–41)
ANION GAP SERPL CALCULATED.3IONS-SCNC: 15 MMOL/L (ref 5–15)
AST SERPL-CCNC: 294 U/L (ref 1–40)
BASOPHILS # BLD AUTO: 0.1 10*3/MM3 (ref 0–0.2)
BASOPHILS NFR BLD AUTO: 0.7 % (ref 0–1.5)
BILIRUB SERPL-MCNC: 9.1 MG/DL (ref 0.2–1.2)
BUN BLD-MCNC: 2 MG/DL (ref 6–20)
BUN/CREAT SERPL: 3.7 (ref 7–25)
CA-I SERPL ISE-MCNC: 1.14 MMOL/L (ref 1.12–1.32)
CALCIUM SPEC-SCNC: 8.3 MG/DL (ref 8.6–10.5)
CHLORIDE SERPL-SCNC: 99 MMOL/L (ref 98–107)
CO2 SERPL-SCNC: 20 MMOL/L (ref 22–29)
CREAT BLD-MCNC: 0.54 MG/DL (ref 0.76–1.27)
DEPRECATED RDW RBC AUTO: 75.7 FL (ref 37–54)
EOSINOPHIL # BLD AUTO: 0.09 10*3/MM3 (ref 0–0.4)
EOSINOPHIL NFR BLD AUTO: 0.7 % (ref 0.3–6.2)
ERYTHROCYTE [DISTWIDTH] IN BLOOD BY AUTOMATED COUNT: 20 % (ref 12.3–15.4)
GFR SERPL CREATININE-BSD FRML MDRD: >150 ML/MIN/1.73
GLOBULIN UR ELPH-MCNC: 3.2 GM/DL
GLUCOSE BLD-MCNC: 84 MG/DL (ref 65–99)
HCT VFR BLD AUTO: 40.4 % (ref 37.5–51)
HGB BLD-MCNC: 13.3 G/DL (ref 13–17.7)
IMM GRANULOCYTES # BLD AUTO: 0.09 10*3/MM3 (ref 0–0.05)
IMM GRANULOCYTES NFR BLD AUTO: 0.7 % (ref 0–0.5)
LYMPHOCYTES # BLD AUTO: 1.72 10*3/MM3 (ref 0.7–3.1)
LYMPHOCYTES NFR BLD AUTO: 12.5 % (ref 19.6–45.3)
MAGNESIUM SERPL-MCNC: 1.8 MG/DL (ref 1.6–2.6)
MCH RBC QN AUTO: 34.2 PG (ref 26.6–33)
MCHC RBC AUTO-ENTMCNC: 32.9 G/DL (ref 31.5–35.7)
MCV RBC AUTO: 103.9 FL (ref 79–97)
MONOCYTES # BLD AUTO: 1.5 10*3/MM3 (ref 0.1–0.9)
MONOCYTES NFR BLD AUTO: 10.9 % (ref 5–12)
NEUTROPHILS # BLD AUTO: 10.31 10*3/MM3 (ref 1.7–7)
NEUTROPHILS NFR BLD AUTO: 74.5 % (ref 42.7–76)
NRBC BLD AUTO-RTO: 0.1 /100 WBC (ref 0–0.2)
PHOSPHATE SERPL-MCNC: 2.6 MG/DL (ref 2.5–4.5)
PLATELET # BLD AUTO: 82 10*3/MM3 (ref 140–450)
PMV BLD AUTO: 13.6 FL (ref 6–12)
POTASSIUM BLD-SCNC: 4.5 MMOL/L (ref 3.5–5.2)
PROCALCITONIN SERPL-MCNC: 0.8 NG/ML (ref 0.1–0.25)
PROT SERPL-MCNC: 5.9 G/DL (ref 6–8.5)
RBC # BLD AUTO: 3.89 10*6/MM3 (ref 4.14–5.8)
SODIUM BLD-SCNC: 134 MMOL/L (ref 136–145)
WBC NRBC COR # BLD: 13.81 10*3/MM3 (ref 3.4–10.8)

## 2020-05-25 PROCEDURE — 80053 COMPREHEN METABOLIC PANEL: CPT | Performed by: INTERNAL MEDICINE

## 2020-05-25 PROCEDURE — 99232 SBSQ HOSP IP/OBS MODERATE 35: CPT | Performed by: INTERNAL MEDICINE

## 2020-05-25 PROCEDURE — 82330 ASSAY OF CALCIUM: CPT | Performed by: INTERNAL MEDICINE

## 2020-05-25 PROCEDURE — 83735 ASSAY OF MAGNESIUM: CPT | Performed by: INTERNAL MEDICINE

## 2020-05-25 PROCEDURE — 71045 X-RAY EXAM CHEST 1 VIEW: CPT

## 2020-05-25 PROCEDURE — 97162 PT EVAL MOD COMPLEX 30 MIN: CPT

## 2020-05-25 PROCEDURE — 25010000002 LORAZEPAM PER 2 MG: Performed by: INTERNAL MEDICINE

## 2020-05-25 PROCEDURE — 84145 PROCALCITONIN (PCT): CPT | Performed by: INTERNAL MEDICINE

## 2020-05-25 PROCEDURE — 97530 THERAPEUTIC ACTIVITIES: CPT

## 2020-05-25 PROCEDURE — 99233 SBSQ HOSP IP/OBS HIGH 50: CPT | Performed by: INTERNAL MEDICINE

## 2020-05-25 PROCEDURE — 85025 COMPLETE CBC W/AUTO DIFF WBC: CPT | Performed by: INTERNAL MEDICINE

## 2020-05-25 PROCEDURE — 25010000002 CEFTRIAXONE PER 250 MG: Performed by: INTERNAL MEDICINE

## 2020-05-25 PROCEDURE — 84100 ASSAY OF PHOSPHORUS: CPT | Performed by: INTERNAL MEDICINE

## 2020-05-25 RX ADMIN — LORAZEPAM 2 MG: 1 TABLET ORAL at 15:41

## 2020-05-25 RX ADMIN — SODIUM CHLORIDE, PRESERVATIVE FREE 10 ML: 5 INJECTION INTRAVENOUS at 21:27

## 2020-05-25 RX ADMIN — LORAZEPAM 2 MG: 2 INJECTION INTRAMUSCULAR; INTRAVENOUS at 19:40

## 2020-05-25 RX ADMIN — FOLIC ACID 1 MG: 1 TABLET ORAL at 08:34

## 2020-05-25 RX ADMIN — DIAZEPAM 10 MG: 5 TABLET ORAL at 05:58

## 2020-05-25 RX ADMIN — DIAZEPAM 10 MG: 5 TABLET ORAL at 14:43

## 2020-05-25 RX ADMIN — LORAZEPAM 1 MG: 1 TABLET ORAL at 04:35

## 2020-05-25 RX ADMIN — LORAZEPAM 1 MG: 2 INJECTION INTRAMUSCULAR; INTRAVENOUS at 22:43

## 2020-05-25 RX ADMIN — NICOTINE 1 PATCH: 21 PATCH TRANSDERMAL at 08:34

## 2020-05-25 RX ADMIN — CEFTRIAXONE 1 G: 1 INJECTION, POWDER, FOR SOLUTION INTRAMUSCULAR; INTRAVENOUS at 21:26

## 2020-05-25 RX ADMIN — LORAZEPAM 1 MG: 1 TABLET ORAL at 12:44

## 2020-05-25 RX ADMIN — BUPRENORPHINE AND NALOXONE 1 TABLET: 8; 2 TABLET SUBLINGUAL at 21:20

## 2020-05-25 RX ADMIN — ZINC SULFATE 220 MG (50 MG) CAPSULE 220 MG: CAPSULE at 08:34

## 2020-05-25 RX ADMIN — ZINC SULFATE 220 MG (50 MG) CAPSULE 220 MG: CAPSULE at 21:23

## 2020-05-25 RX ADMIN — PANTOPRAZOLE SODIUM 40 MG: 40 TABLET, DELAYED RELEASE ORAL at 05:59

## 2020-05-25 RX ADMIN — LORAZEPAM 1 MG: 1 TABLET ORAL at 01:46

## 2020-05-25 RX ADMIN — THIAMINE HCL TAB 100 MG 100 MG: 100 TAB at 08:34

## 2020-05-25 RX ADMIN — DIAZEPAM 10 MG: 5 TABLET ORAL at 21:20

## 2020-05-25 RX ADMIN — LORAZEPAM 1 MG: 1 TABLET ORAL at 08:34

## 2020-05-26 LAB
ALBUMIN SERPL-MCNC: 2.6 G/DL (ref 3.5–5.2)
ALBUMIN/GLOB SERPL: 0.9 G/DL
ALP SERPL-CCNC: 314 U/L (ref 39–117)
ALT SERPL W P-5'-P-CCNC: 61 U/L (ref 1–41)
AMYLASE SERPL-CCNC: 58 U/L (ref 28–100)
ANION GAP SERPL CALCULATED.3IONS-SCNC: 14 MMOL/L (ref 5–15)
AST SERPL-CCNC: 218 U/L (ref 1–40)
BASOPHILS # BLD AUTO: 0.09 10*3/MM3 (ref 0–0.2)
BASOPHILS NFR BLD AUTO: 0.7 % (ref 0–1.5)
BILIRUB SERPL-MCNC: 9.1 MG/DL (ref 0.2–1.2)
BUN BLD-MCNC: 3 MG/DL (ref 6–20)
BUN/CREAT SERPL: 5.1 (ref 7–25)
CALCIUM SPEC-SCNC: 8.1 MG/DL (ref 8.6–10.5)
CHLORIDE SERPL-SCNC: 101 MMOL/L (ref 98–107)
CO2 SERPL-SCNC: 22 MMOL/L (ref 22–29)
CREAT BLD-MCNC: 0.59 MG/DL (ref 0.76–1.27)
DEPRECATED RDW RBC AUTO: 76.1 FL (ref 37–54)
EOSINOPHIL # BLD AUTO: 0.19 10*3/MM3 (ref 0–0.4)
EOSINOPHIL NFR BLD AUTO: 1.5 % (ref 0.3–6.2)
ERYTHROCYTE [DISTWIDTH] IN BLOOD BY AUTOMATED COUNT: 20.2 % (ref 12.3–15.4)
GFR SERPL CREATININE-BSD FRML MDRD: >150 ML/MIN/1.73
GLOBULIN UR ELPH-MCNC: 2.9 GM/DL
GLUCOSE BLD-MCNC: 80 MG/DL (ref 65–99)
HBV SURFACE AB SER RIA-ACNC: REACTIVE
HCT VFR BLD AUTO: 40 % (ref 37.5–51)
HGB BLD-MCNC: 13.2 G/DL (ref 13–17.7)
IMM GRANULOCYTES # BLD AUTO: 0.08 10*3/MM3 (ref 0–0.05)
IMM GRANULOCYTES NFR BLD AUTO: 0.6 % (ref 0–0.5)
INR PPP: 1.34 (ref 0.85–1.16)
LYMPHOCYTES # BLD AUTO: 1.63 10*3/MM3 (ref 0.7–3.1)
LYMPHOCYTES NFR BLD AUTO: 12.9 % (ref 19.6–45.3)
MCH RBC QN AUTO: 34.2 PG (ref 26.6–33)
MCHC RBC AUTO-ENTMCNC: 33 G/DL (ref 31.5–35.7)
MCV RBC AUTO: 103.6 FL (ref 79–97)
MONOCYTES # BLD AUTO: 1.5 10*3/MM3 (ref 0.1–0.9)
MONOCYTES NFR BLD AUTO: 11.9 % (ref 5–12)
NEUTROPHILS # BLD AUTO: 9.12 10*3/MM3 (ref 1.7–7)
NEUTROPHILS NFR BLD AUTO: 72.4 % (ref 42.7–76)
NRBC BLD AUTO-RTO: 0 /100 WBC (ref 0–0.2)
PLAT MORPH BLD: NORMAL
PLATELET # BLD AUTO: 80 10*3/MM3 (ref 140–450)
PMV BLD AUTO: 12.2 FL (ref 6–12)
POTASSIUM BLD-SCNC: 4.2 MMOL/L (ref 3.5–5.2)
PROCALCITONIN SERPL-MCNC: 0.84 NG/ML (ref 0.1–0.25)
PROT SERPL-MCNC: 5.5 G/DL (ref 6–8.5)
PROTHROMBIN TIME: 16.3 SECONDS (ref 11.5–14)
RBC # BLD AUTO: 3.86 10*6/MM3 (ref 4.14–5.8)
RBC MORPH BLD: NORMAL
SODIUM BLD-SCNC: 137 MMOL/L (ref 136–145)
WBC MORPH BLD: NORMAL
WBC NRBC COR # BLD: 12.61 10*3/MM3 (ref 3.4–10.8)

## 2020-05-26 PROCEDURE — 25010000002 CEFTRIAXONE PER 250 MG: Performed by: INTERNAL MEDICINE

## 2020-05-26 PROCEDURE — 25010000002 ENOXAPARIN PER 10 MG: Performed by: INTERNAL MEDICINE

## 2020-05-26 PROCEDURE — 85610 PROTHROMBIN TIME: CPT | Performed by: INTERNAL MEDICINE

## 2020-05-26 PROCEDURE — 97166 OT EVAL MOD COMPLEX 45 MIN: CPT

## 2020-05-26 PROCEDURE — 82150 ASSAY OF AMYLASE: CPT | Performed by: INTERNAL MEDICINE

## 2020-05-26 PROCEDURE — 25010000002 PROMETHAZINE PER 50 MG: Performed by: INTERNAL MEDICINE

## 2020-05-26 PROCEDURE — 25010000002 LORAZEPAM PER 2 MG: Performed by: INTERNAL MEDICINE

## 2020-05-26 PROCEDURE — 99233 SBSQ HOSP IP/OBS HIGH 50: CPT | Performed by: INTERNAL MEDICINE

## 2020-05-26 PROCEDURE — 86708 HEPATITIS A ANTIBODY: CPT | Performed by: INTERNAL MEDICINE

## 2020-05-26 PROCEDURE — 80053 COMPREHEN METABOLIC PANEL: CPT | Performed by: INTERNAL MEDICINE

## 2020-05-26 PROCEDURE — 84145 PROCALCITONIN (PCT): CPT | Performed by: INTERNAL MEDICINE

## 2020-05-26 PROCEDURE — 99231 SBSQ HOSP IP/OBS SF/LOW 25: CPT | Performed by: PHYSICIAN ASSISTANT

## 2020-05-26 PROCEDURE — 85007 BL SMEAR W/DIFF WBC COUNT: CPT | Performed by: INTERNAL MEDICINE

## 2020-05-26 PROCEDURE — 86706 HEP B SURFACE ANTIBODY: CPT | Performed by: INTERNAL MEDICINE

## 2020-05-26 PROCEDURE — 85025 COMPLETE CBC W/AUTO DIFF WBC: CPT | Performed by: INTERNAL MEDICINE

## 2020-05-26 RX ADMIN — CEFTRIAXONE 1 G: 1 INJECTION, POWDER, FOR SOLUTION INTRAMUSCULAR; INTRAVENOUS at 22:24

## 2020-05-26 RX ADMIN — DIAZEPAM 10 MG: 5 TABLET ORAL at 21:37

## 2020-05-26 RX ADMIN — ENOXAPARIN SODIUM 40 MG: 40 INJECTION SUBCUTANEOUS at 12:21

## 2020-05-26 RX ADMIN — DIAZEPAM 10 MG: 5 TABLET ORAL at 13:02

## 2020-05-26 RX ADMIN — PROMETHAZINE HYDROCHLORIDE 12.5 MG: 25 INJECTION INTRAMUSCULAR; INTRAVENOUS at 17:19

## 2020-05-26 RX ADMIN — ZINC SULFATE 220 MG (50 MG) CAPSULE 220 MG: CAPSULE at 21:37

## 2020-05-26 RX ADMIN — LORAZEPAM 2 MG: 2 INJECTION INTRAMUSCULAR; INTRAVENOUS at 11:19

## 2020-05-26 RX ADMIN — LORAZEPAM 2 MG: 2 INJECTION INTRAMUSCULAR; INTRAVENOUS at 12:59

## 2020-05-26 RX ADMIN — LORAZEPAM 2 MG: 2 INJECTION INTRAMUSCULAR; INTRAVENOUS at 15:23

## 2020-05-26 RX ADMIN — LORAZEPAM 1 MG: 2 INJECTION INTRAMUSCULAR; INTRAVENOUS at 21:32

## 2020-05-26 RX ADMIN — BUPRENORPHINE AND NALOXONE 1 TABLET: 8; 2 TABLET SUBLINGUAL at 21:37

## 2020-05-26 RX ADMIN — LORAZEPAM 2 MG: 2 INJECTION INTRAMUSCULAR; INTRAVENOUS at 08:36

## 2020-05-26 RX ADMIN — SODIUM CHLORIDE, PRESERVATIVE FREE 10 ML: 5 INJECTION INTRAVENOUS at 21:37

## 2020-05-26 RX ADMIN — PANTOPRAZOLE SODIUM 40 MG: 40 TABLET, DELAYED RELEASE ORAL at 05:19

## 2020-05-26 RX ADMIN — THIAMINE HCL TAB 100 MG 100 MG: 100 TAB at 08:36

## 2020-05-26 RX ADMIN — SODIUM CHLORIDE, PRESERVATIVE FREE 10 ML: 5 INJECTION INTRAVENOUS at 08:36

## 2020-05-26 RX ADMIN — DIAZEPAM 10 MG: 5 TABLET ORAL at 05:19

## 2020-05-26 RX ADMIN — LORAZEPAM 2 MG: 2 INJECTION INTRAMUSCULAR; INTRAVENOUS at 00:47

## 2020-05-26 RX ADMIN — LORAZEPAM 2 MG: 2 INJECTION INTRAMUSCULAR; INTRAVENOUS at 17:19

## 2020-05-26 RX ADMIN — NICOTINE 1 PATCH: 21 PATCH TRANSDERMAL at 08:35

## 2020-05-26 RX ADMIN — LORAZEPAM 1 MG: 2 INJECTION INTRAMUSCULAR; INTRAVENOUS at 03:35

## 2020-05-26 RX ADMIN — ZINC SULFATE 220 MG (50 MG) CAPSULE 220 MG: CAPSULE at 08:36

## 2020-05-26 RX ADMIN — FOLIC ACID 1 MG: 1 TABLET ORAL at 08:36

## 2020-05-27 LAB — HAV AB SER QL IA: POSITIVE

## 2020-05-27 PROCEDURE — 99232 SBSQ HOSP IP/OBS MODERATE 35: CPT | Performed by: INTERNAL MEDICINE

## 2020-05-27 PROCEDURE — 97110 THERAPEUTIC EXERCISES: CPT

## 2020-05-27 PROCEDURE — 25010000002 LORAZEPAM PER 2 MG: Performed by: INTERNAL MEDICINE

## 2020-05-27 PROCEDURE — 25010000002 ENOXAPARIN PER 10 MG: Performed by: INTERNAL MEDICINE

## 2020-05-27 RX ORDER — SODIUM CHLORIDE 9 MG/ML
100 INJECTION, SOLUTION INTRAVENOUS CONTINUOUS
Status: DISCONTINUED | OUTPATIENT
Start: 2020-05-27 | End: 2020-05-29

## 2020-05-27 RX ADMIN — ENOXAPARIN SODIUM 40 MG: 40 INJECTION SUBCUTANEOUS at 11:49

## 2020-05-27 RX ADMIN — LORAZEPAM 2 MG: 1 TABLET ORAL at 17:19

## 2020-05-27 RX ADMIN — BUPRENORPHINE AND NALOXONE 1 TABLET: 8; 2 TABLET SUBLINGUAL at 21:07

## 2020-05-27 RX ADMIN — LORAZEPAM 2 MG: 2 INJECTION INTRAMUSCULAR; INTRAVENOUS at 22:18

## 2020-05-27 RX ADMIN — THIAMINE HCL TAB 100 MG 100 MG: 100 TAB at 08:20

## 2020-05-27 RX ADMIN — NICOTINE 1 PATCH: 21 PATCH TRANSDERMAL at 08:21

## 2020-05-27 RX ADMIN — DIAZEPAM 10 MG: 5 TABLET ORAL at 21:07

## 2020-05-27 RX ADMIN — LORAZEPAM 2 MG: 1 TABLET ORAL at 08:20

## 2020-05-27 RX ADMIN — LORAZEPAM 1 MG: 2 INJECTION INTRAMUSCULAR; INTRAVENOUS at 06:07

## 2020-05-27 RX ADMIN — LORAZEPAM 2 MG: 2 INJECTION INTRAMUSCULAR; INTRAVENOUS at 21:18

## 2020-05-27 RX ADMIN — SODIUM CHLORIDE 100 ML/HR: 9 INJECTION, SOLUTION INTRAVENOUS at 15:08

## 2020-05-27 RX ADMIN — DIAZEPAM 10 MG: 5 TABLET ORAL at 05:58

## 2020-05-27 RX ADMIN — LORAZEPAM 2 MG: 1 TABLET ORAL at 11:49

## 2020-05-27 RX ADMIN — ZINC SULFATE 220 MG (50 MG) CAPSULE 220 MG: CAPSULE at 08:20

## 2020-05-27 RX ADMIN — PANTOPRAZOLE SODIUM 40 MG: 40 TABLET, DELAYED RELEASE ORAL at 05:58

## 2020-05-27 RX ADMIN — FOLIC ACID 1 MG: 1 TABLET ORAL at 08:20

## 2020-05-27 RX ADMIN — DIAZEPAM 10 MG: 5 TABLET ORAL at 13:43

## 2020-05-27 RX ADMIN — ZINC SULFATE 220 MG (50 MG) CAPSULE 220 MG: CAPSULE at 21:07

## 2020-05-28 LAB
BACTERIA SPEC AEROBE CULT: NORMAL
BACTERIA SPEC AEROBE CULT: NORMAL

## 2020-05-28 PROCEDURE — 25010000002 ENOXAPARIN PER 10 MG: Performed by: INTERNAL MEDICINE

## 2020-05-28 PROCEDURE — 99233 SBSQ HOSP IP/OBS HIGH 50: CPT | Performed by: NURSE PRACTITIONER

## 2020-05-28 PROCEDURE — 25010000002 LORAZEPAM PER 2 MG: Performed by: INTERNAL MEDICINE

## 2020-05-28 PROCEDURE — 80053 COMPREHEN METABOLIC PANEL: CPT | Performed by: FAMILY MEDICINE

## 2020-05-28 PROCEDURE — 25010000002 ONDANSETRON PER 1 MG: Performed by: INTERNAL MEDICINE

## 2020-05-28 PROCEDURE — 83735 ASSAY OF MAGNESIUM: CPT | Performed by: FAMILY MEDICINE

## 2020-05-28 PROCEDURE — 85025 COMPLETE CBC W/AUTO DIFF WBC: CPT | Performed by: FAMILY MEDICINE

## 2020-05-28 RX ORDER — ONDANSETRON 2 MG/ML
4 INJECTION INTRAMUSCULAR; INTRAVENOUS EVERY 6 HOURS PRN
Status: DISCONTINUED | OUTPATIENT
Start: 2020-05-28 | End: 2020-05-29

## 2020-05-28 RX ORDER — IBUPROFEN 600 MG/1
600 TABLET ORAL EVERY 4 HOURS PRN
Status: DISCONTINUED | OUTPATIENT
Start: 2020-05-28 | End: 2020-05-29 | Stop reason: HOSPADM

## 2020-05-28 RX ADMIN — ENOXAPARIN SODIUM 40 MG: 40 INJECTION SUBCUTANEOUS at 14:07

## 2020-05-28 RX ADMIN — LORAZEPAM 2 MG: 2 INJECTION INTRAMUSCULAR; INTRAVENOUS at 20:17

## 2020-05-28 RX ADMIN — LORAZEPAM 2 MG: 2 INJECTION INTRAMUSCULAR; INTRAVENOUS at 14:07

## 2020-05-28 RX ADMIN — LORAZEPAM 2 MG: 2 INJECTION INTRAMUSCULAR; INTRAVENOUS at 00:05

## 2020-05-28 RX ADMIN — ZINC SULFATE 220 MG (50 MG) CAPSULE 220 MG: CAPSULE at 09:35

## 2020-05-28 RX ADMIN — NICOTINE 1 PATCH: 21 PATCH TRANSDERMAL at 09:35

## 2020-05-28 RX ADMIN — LORAZEPAM 2 MG: 2 INJECTION INTRAMUSCULAR; INTRAVENOUS at 09:45

## 2020-05-28 RX ADMIN — DIAZEPAM 10 MG: 5 TABLET ORAL at 21:46

## 2020-05-28 RX ADMIN — SODIUM CHLORIDE, PRESERVATIVE FREE 10 ML: 5 INJECTION INTRAVENOUS at 20:08

## 2020-05-28 RX ADMIN — LORAZEPAM 2 MG: 1 TABLET ORAL at 05:32

## 2020-05-28 RX ADMIN — THIAMINE HCL TAB 100 MG 100 MG: 100 TAB at 09:35

## 2020-05-28 RX ADMIN — LORAZEPAM 2 MG: 2 INJECTION INTRAMUSCULAR; INTRAVENOUS at 17:31

## 2020-05-28 RX ADMIN — SODIUM CHLORIDE 100 ML/HR: 9 INJECTION, SOLUTION INTRAVENOUS at 09:57

## 2020-05-28 RX ADMIN — LORAZEPAM 2 MG: 2 INJECTION INTRAMUSCULAR; INTRAVENOUS at 22:58

## 2020-05-28 RX ADMIN — IBUPROFEN 600 MG: 600 TABLET, FILM COATED ORAL at 23:09

## 2020-05-28 RX ADMIN — BUPRENORPHINE AND NALOXONE 1 TABLET: 8; 2 TABLET SUBLINGUAL at 20:08

## 2020-05-28 RX ADMIN — IBUPROFEN 600 MG: 600 TABLET, FILM COATED ORAL at 09:45

## 2020-05-28 RX ADMIN — DIAZEPAM 10 MG: 5 TABLET ORAL at 05:32

## 2020-05-28 RX ADMIN — ZINC SULFATE 220 MG (50 MG) CAPSULE 220 MG: CAPSULE at 20:08

## 2020-05-28 RX ADMIN — ONDANSETRON 4 MG: 2 INJECTION INTRAMUSCULAR; INTRAVENOUS at 09:44

## 2020-05-28 RX ADMIN — DIAZEPAM 10 MG: 5 TABLET ORAL at 14:07

## 2020-05-28 RX ADMIN — PANTOPRAZOLE SODIUM 40 MG: 40 TABLET, DELAYED RELEASE ORAL at 05:32

## 2020-05-28 RX ADMIN — FOLIC ACID 1 MG: 1 TABLET ORAL at 09:35

## 2020-05-28 RX ADMIN — SODIUM CHLORIDE 100 ML/HR: 9 INJECTION, SOLUTION INTRAVENOUS at 17:32

## 2020-05-29 VITALS
DIASTOLIC BLOOD PRESSURE: 90 MMHG | RESPIRATION RATE: 18 BRPM | WEIGHT: 167.77 LBS | TEMPERATURE: 98.1 F | OXYGEN SATURATION: 90 % | HEIGHT: 69 IN | HEART RATE: 108 BPM | BODY MASS INDEX: 24.85 KG/M2 | SYSTOLIC BLOOD PRESSURE: 121 MMHG

## 2020-05-29 LAB
ALBUMIN SERPL-MCNC: 2.3 G/DL (ref 3.5–5.2)
ALBUMIN SERPL-MCNC: 2.3 G/DL (ref 3.5–5.2)
ALBUMIN/GLOB SERPL: 0.8 G/DL
ALBUMIN/GLOB SERPL: 0.9 G/DL
ALP SERPL-CCNC: 265 U/L (ref 39–117)
ALP SERPL-CCNC: 289 U/L (ref 39–117)
ALT SERPL W P-5'-P-CCNC: 37 U/L (ref 1–41)
ALT SERPL W P-5'-P-CCNC: 38 U/L (ref 1–41)
AMPHET+METHAMPHET UR QL: NEGATIVE
AMPHETAMINES UR QL: NEGATIVE
ANION GAP SERPL CALCULATED.3IONS-SCNC: 12 MMOL/L (ref 5–15)
ANION GAP SERPL CALCULATED.3IONS-SCNC: 15 MMOL/L (ref 5–15)
AST SERPL-CCNC: 138 U/L (ref 1–40)
AST SERPL-CCNC: 143 U/L (ref 1–40)
BARBITURATES UR QL SCN: NEGATIVE
BASOPHILS # BLD AUTO: 0.13 10*3/MM3 (ref 0–0.2)
BASOPHILS NFR BLD AUTO: 1.2 % (ref 0–1.5)
BENZODIAZ UR QL SCN: POSITIVE
BILIRUB SERPL-MCNC: 8.6 MG/DL (ref 0.2–1.2)
BILIRUB SERPL-MCNC: 8.8 MG/DL (ref 0.2–1.2)
BUN BLD-MCNC: 3 MG/DL (ref 6–20)
BUN BLD-MCNC: 3 MG/DL (ref 6–20)
BUN/CREAT SERPL: 5.2 (ref 7–25)
BUN/CREAT SERPL: 5.8 (ref 7–25)
BUPRENORPHINE SERPL-MCNC: POSITIVE NG/ML
CALCIUM SPEC-SCNC: 7.6 MG/DL (ref 8.6–10.5)
CALCIUM SPEC-SCNC: 8 MG/DL (ref 8.6–10.5)
CANNABINOIDS SERPL QL: NEGATIVE
CHLORIDE SERPL-SCNC: 106 MMOL/L (ref 98–107)
CHLORIDE SERPL-SCNC: 107 MMOL/L (ref 98–107)
CO2 SERPL-SCNC: 20 MMOL/L (ref 22–29)
CO2 SERPL-SCNC: 22 MMOL/L (ref 22–29)
COCAINE UR QL: NEGATIVE
CREAT BLD-MCNC: 0.52 MG/DL (ref 0.76–1.27)
CREAT BLD-MCNC: 0.58 MG/DL (ref 0.76–1.27)
D-LACTATE SERPL-SCNC: 2.7 MMOL/L (ref 0.5–2)
DEPRECATED RDW RBC AUTO: 76.9 FL (ref 37–54)
DEPRECATED RDW RBC AUTO: 77.5 FL (ref 37–54)
EOSINOPHIL # BLD AUTO: 0.22 10*3/MM3 (ref 0–0.4)
EOSINOPHIL NFR BLD AUTO: 2 % (ref 0.3–6.2)
ERYTHROCYTE [DISTWIDTH] IN BLOOD BY AUTOMATED COUNT: 19.9 % (ref 12.3–15.4)
ERYTHROCYTE [DISTWIDTH] IN BLOOD BY AUTOMATED COUNT: 20.1 % (ref 12.3–15.4)
ETHANOL BLD-MCNC: <10 MG/DL (ref 0–10)
GFR SERPL CREATININE-BSD FRML MDRD: >150 ML/MIN/1.73
GFR SERPL CREATININE-BSD FRML MDRD: >150 ML/MIN/1.73
GLOBULIN UR ELPH-MCNC: 2.7 GM/DL
GLOBULIN UR ELPH-MCNC: 2.9 GM/DL
GLUCOSE BLD-MCNC: 79 MG/DL (ref 65–99)
GLUCOSE BLD-MCNC: 94 MG/DL (ref 65–99)
HCT VFR BLD AUTO: 37.9 % (ref 37.5–51)
HCT VFR BLD AUTO: 38.1 % (ref 37.5–51)
HGB BLD-MCNC: 12.3 G/DL (ref 13–17.7)
HGB BLD-MCNC: 12.5 G/DL (ref 13–17.7)
IMM GRANULOCYTES # BLD AUTO: 0.3 10*3/MM3 (ref 0–0.05)
IMM GRANULOCYTES NFR BLD AUTO: 2.7 % (ref 0–0.5)
INR PPP: 1.28 (ref 0.85–1.16)
LIPASE SERPL-CCNC: 19 U/L (ref 13–60)
LYMPHOCYTES # BLD AUTO: 1.56 10*3/MM3 (ref 0.7–3.1)
LYMPHOCYTES NFR BLD AUTO: 13.9 % (ref 19.6–45.3)
MAGNESIUM SERPL-MCNC: 1.8 MG/DL (ref 1.6–2.6)
MAGNESIUM SERPL-MCNC: 2.3 MG/DL (ref 1.6–2.6)
MCH RBC QN AUTO: 34.3 PG (ref 26.6–33)
MCH RBC QN AUTO: 34.9 PG (ref 26.6–33)
MCHC RBC AUTO-ENTMCNC: 32.5 G/DL (ref 31.5–35.7)
MCHC RBC AUTO-ENTMCNC: 32.8 G/DL (ref 31.5–35.7)
MCV RBC AUTO: 105.6 FL (ref 79–97)
MCV RBC AUTO: 106.4 FL (ref 79–97)
METHADONE UR QL SCN: NEGATIVE
MONOCYTES # BLD AUTO: 1.75 10*3/MM3 (ref 0.1–0.9)
MONOCYTES NFR BLD AUTO: 15.6 % (ref 5–12)
NEUTROPHILS # BLD AUTO: 7.24 10*3/MM3 (ref 1.7–7)
NEUTROPHILS NFR BLD AUTO: 64.6 % (ref 42.7–76)
NRBC BLD AUTO-RTO: 0.4 /100 WBC (ref 0–0.2)
OPIATES UR QL: NEGATIVE
OXYCODONE UR QL SCN: NEGATIVE
PCP UR QL SCN: NEGATIVE
PLATELET # BLD AUTO: 123 10*3/MM3 (ref 140–450)
PLATELET # BLD AUTO: 128 10*3/MM3 (ref 140–450)
PMV BLD AUTO: 12 FL (ref 6–12)
PMV BLD AUTO: 13.8 FL (ref 6–12)
POTASSIUM BLD-SCNC: 3.6 MMOL/L (ref 3.5–5.2)
POTASSIUM BLD-SCNC: 3.8 MMOL/L (ref 3.5–5.2)
PROPOXYPH UR QL: NEGATIVE
PROT SERPL-MCNC: 5 G/DL (ref 6–8.5)
PROT SERPL-MCNC: 5.2 G/DL (ref 6–8.5)
PROTHROMBIN TIME: 15.7 SECONDS (ref 11.5–14)
RBC # BLD AUTO: 3.58 10*6/MM3 (ref 4.14–5.8)
RBC # BLD AUTO: 3.59 10*6/MM3 (ref 4.14–5.8)
SODIUM BLD-SCNC: 141 MMOL/L (ref 136–145)
SODIUM BLD-SCNC: 141 MMOL/L (ref 136–145)
TRICYCLICS UR QL SCN: NEGATIVE
WBC NRBC COR # BLD: 11.2 10*3/MM3 (ref 3.4–10.8)
WBC NRBC COR # BLD: 11.43 10*3/MM3 (ref 3.4–10.8)

## 2020-05-29 PROCEDURE — 99232 SBSQ HOSP IP/OBS MODERATE 35: CPT | Performed by: PHYSICIAN ASSISTANT

## 2020-05-29 PROCEDURE — 25010000002 LORAZEPAM PER 2 MG: Performed by: INTERNAL MEDICINE

## 2020-05-29 PROCEDURE — 83605 ASSAY OF LACTIC ACID: CPT | Performed by: NURSE PRACTITIONER

## 2020-05-29 PROCEDURE — 80053 COMPREHEN METABOLIC PANEL: CPT | Performed by: NURSE PRACTITIONER

## 2020-05-29 PROCEDURE — 85027 COMPLETE CBC AUTOMATED: CPT | Performed by: NURSE PRACTITIONER

## 2020-05-29 PROCEDURE — 85610 PROTHROMBIN TIME: CPT | Performed by: NURSE PRACTITIONER

## 2020-05-29 PROCEDURE — 83735 ASSAY OF MAGNESIUM: CPT | Performed by: INTERNAL MEDICINE

## 2020-05-29 PROCEDURE — 80307 DRUG TEST PRSMV CHEM ANLYZR: CPT | Performed by: INTERNAL MEDICINE

## 2020-05-29 PROCEDURE — 83690 ASSAY OF LIPASE: CPT | Performed by: NURSE PRACTITIONER

## 2020-05-29 PROCEDURE — 25010000003 MAGNESIUM SULFATE 4 GM/100ML SOLUTION: Performed by: INTERNAL MEDICINE

## 2020-05-29 RX ADMIN — LORAZEPAM 2 MG: 2 INJECTION INTRAMUSCULAR; INTRAVENOUS at 02:27

## 2020-05-29 RX ADMIN — FOLIC ACID 1 MG: 1 TABLET ORAL at 08:46

## 2020-05-29 RX ADMIN — SODIUM CHLORIDE 100 ML/HR: 9 INJECTION, SOLUTION INTRAVENOUS at 08:46

## 2020-05-29 RX ADMIN — MAGNESIUM SULFATE HEPTAHYDRATE 4 G: 40 INJECTION, SOLUTION INTRAVENOUS at 01:31

## 2020-05-29 RX ADMIN — PANTOPRAZOLE SODIUM 40 MG: 40 TABLET, DELAYED RELEASE ORAL at 05:31

## 2020-05-29 RX ADMIN — THIAMINE HCL TAB 100 MG 100 MG: 100 TAB at 08:46

## 2020-05-29 RX ADMIN — LORAZEPAM 2 MG: 2 INJECTION INTRAMUSCULAR; INTRAVENOUS at 01:11

## 2020-05-29 RX ADMIN — LORAZEPAM 2 MG: 2 INJECTION INTRAMUSCULAR; INTRAVENOUS at 08:46

## 2020-05-29 RX ADMIN — LORAZEPAM 2 MG: 2 INJECTION INTRAMUSCULAR; INTRAVENOUS at 05:33

## 2020-05-29 RX ADMIN — NICOTINE 1 PATCH: 21 PATCH TRANSDERMAL at 08:46

## 2020-05-29 RX ADMIN — DIAZEPAM 10 MG: 5 TABLET ORAL at 05:31

## 2020-05-29 RX ADMIN — ZINC SULFATE 220 MG (50 MG) CAPSULE 220 MG: CAPSULE at 08:46

## 2020-05-30 ENCOUNTER — READMISSION MANAGEMENT (OUTPATIENT)
Dept: CALL CENTER | Facility: HOSPITAL | Age: 30
End: 2020-05-30

## 2020-05-30 NOTE — OUTREACH NOTE
Prep Survey      Responses   [facility] patient discharged from?  [facility]   Is LACE score < 7 ?  No   Eligibility  Not Eligible   What are the reasons patient is not eligible?  [facility]   Does the patient have one of the following disease processes/diagnoses(primary or secondary)?  Other   Prep survey completed?  Yes          Rahel Bird, RN

## 2020-06-05 NOTE — DISCHARGE SUMMARY
[facility] Medicine Services  ELOPEMENT AGAINST MEDICAL ADVICE    Patient Name: Eusebio Holley  : 1990  MRN: [id number]    Date of Admission: 2020  Date of Elopement: 2020  Primary Care Physician: Alexis Watters MD    Consults     Date and Time Order Name Status Description    2020 1253 Inpatient Gastroenterology Consult Completed         Hospital Course     Presenting Problem:   Alcohol-induced acute pancreatitis without infection or necrosis [K85.20]  Pancreatitis [K85.90]    Active Hospital Problems    Diagnosis  POA   • **Alcohol-induced acute pancreatitis [K85.20]  Yes   • Delirium tremens (CMS/HCC) [F10.231]  Unknown   • Alcoholic hepatitis [K70.10]  Yes   • Lactic acidosis [E87.2]  Unknown   • Suboxone/narcotic abuse, reportedly buys this on the street [F11.10]  Yes   • Essential hypertension [I10]  Yes   • Alcohol dependence with withdrawal (CMS/HCC) [F10.239]  Yes      Resolved Hospital Problems   No resolved problems to display.        Hospital Course:  Eusebio Holley is a 29 y.o. male with past medical history of alcohol abuse, opioid dependence on Subuxone and history of alcohol induced pancreatitis who was admitted May 22 for alcohol induced pancreatitis, alcoholic hepatitis and alcohol withdrawal.  He has tried to leave AMA several times over last day.  Concern today with his slurred speech that he should not be leaving, he remained alert and oriented x3.         **Patient left AMA prior to completion of evaluation and management**      Day of Discharge     HPI:   **Patient left AMA prior to completion of evaluation and management**    Vital Signs:         Physical Exam (if applicable):  Constitutional: No acute distress, awake, alert  HENT: NCAT, mucous membranes moist  Respiratory: Clear to auscultation bilaterally, respiratory effort normal   Cardiovascular: RRR, no murmurs, rubs, or gallops  Gastrointestinal: Positive bowel sounds,  soft, nontender, nondistended  Musculoskeletal: No bilateral ankle edema  Psychiatric: Appropriate affect, cooperative  Neurologic: Oriented x 3, tremulous, intermittent slurred       Discharge Details     Discharge Disposition:  **Patient left AMA prior to completion of evaluation and management, therefore discharge planning remains incomplete including absence of any needed discharging medications, testing arrangements or follow up unless otherwise specified**    No future appointments.        Electronically signed by Celia Stephenson MD, 06/05/20, 8:38 AM.

## 2020-10-20 ENCOUNTER — APPOINTMENT (OUTPATIENT)
Dept: CT IMAGING | Facility: HOSPITAL | Age: 30
End: 2020-10-20

## 2020-10-20 ENCOUNTER — APPOINTMENT (OUTPATIENT)
Dept: GENERAL RADIOLOGY | Facility: HOSPITAL | Age: 30
End: 2020-10-20

## 2020-10-20 ENCOUNTER — HOSPITAL ENCOUNTER (INPATIENT)
Facility: HOSPITAL | Age: 30
LOS: 6 days | Discharge: HOME | End: 2020-10-26
Attending: EMERGENCY MEDICINE | Admitting: INTERNAL MEDICINE

## 2020-10-20 DIAGNOSIS — K85.20 ALCOHOL-INDUCED ACUTE PANCREATITIS, UNSPECIFIED COMPLICATION STATUS: Primary | ICD-10-CM

## 2020-10-20 DIAGNOSIS — F10.231 ALCOHOL DEPENDENCE WITH WITHDRAWAL DELIRIUM (HCC): ICD-10-CM

## 2020-10-20 DIAGNOSIS — S02.5XXD CLOSED FRACTURE OF TOOTH WITH ROUTINE HEALING, SUBSEQUENT ENCOUNTER: ICD-10-CM

## 2020-10-20 DIAGNOSIS — F10.930 ALCOHOL WITHDRAWAL SYNDROME WITHOUT COMPLICATION (HCC): ICD-10-CM

## 2020-10-20 LAB
ALBUMIN SERPL-MCNC: 4.3 G/DL (ref 3.5–5.2)
ALBUMIN/GLOB SERPL: 1.1 G/DL
ALP SERPL-CCNC: 216 U/L (ref 39–117)
ALT SERPL W P-5'-P-CCNC: 54 U/L (ref 1–41)
ANION GAP SERPL CALCULATED.3IONS-SCNC: 15 MMOL/L (ref 5–15)
AST SERPL-CCNC: 90 U/L (ref 1–40)
BASOPHILS # BLD AUTO: 0.06 10*3/MM3 (ref 0–0.2)
BASOPHILS NFR BLD AUTO: 0.6 % (ref 0–1.5)
BILIRUB SERPL-MCNC: 1.3 MG/DL (ref 0–1.2)
BUN SERPL-MCNC: 5 MG/DL (ref 6–20)
BUN/CREAT SERPL: 7.1 (ref 7–25)
CALCIUM SPEC-SCNC: 9.5 MG/DL (ref 8.6–10.5)
CHLORIDE SERPL-SCNC: 99 MMOL/L (ref 98–107)
CHOLEST SERPL-MCNC: 168 MG/DL (ref 0–200)
CO2 SERPL-SCNC: 20 MMOL/L (ref 22–29)
CREAT SERPL-MCNC: 0.7 MG/DL (ref 0.76–1.27)
DEPRECATED RDW RBC AUTO: 45 FL (ref 37–54)
EOSINOPHIL # BLD AUTO: 0.26 10*3/MM3 (ref 0–0.4)
EOSINOPHIL NFR BLD AUTO: 2.8 % (ref 0.3–6.2)
ERYTHROCYTE [DISTWIDTH] IN BLOOD BY AUTOMATED COUNT: 13.2 % (ref 12.3–15.4)
ETHANOL BLD-MCNC: 32 MG/DL (ref 0–10)
GFR SERPL CREATININE-BSD FRML MDRD: 132 ML/MIN/1.73
GLOBULIN UR ELPH-MCNC: 4 GM/DL
GLUCOSE SERPL-MCNC: 106 MG/DL (ref 65–99)
HCT VFR BLD AUTO: 51.2 % (ref 37.5–51)
HDLC SERPL-MCNC: 36 MG/DL (ref 40–60)
HGB BLD-MCNC: 17.4 G/DL (ref 13–17.7)
HOLD SPECIMEN: NORMAL
HOLD SPECIMEN: NORMAL
IMM GRANULOCYTES # BLD AUTO: 0.04 10*3/MM3 (ref 0–0.05)
IMM GRANULOCYTES NFR BLD AUTO: 0.4 % (ref 0–0.5)
INR PPP: 1.23 (ref 0.85–1.16)
LDLC SERPL CALC-MCNC: 101 MG/DL (ref 0–100)
LDLC/HDLC SERPL: 2.68 {RATIO}
LIPASE SERPL-CCNC: 432 U/L (ref 13–60)
LYMPHOCYTES # BLD AUTO: 1.58 10*3/MM3 (ref 0.7–3.1)
LYMPHOCYTES NFR BLD AUTO: 16.9 % (ref 19.6–45.3)
MCH RBC QN AUTO: 31.5 PG (ref 26.6–33)
MCHC RBC AUTO-ENTMCNC: 34 G/DL (ref 31.5–35.7)
MCV RBC AUTO: 92.8 FL (ref 79–97)
MONOCYTES # BLD AUTO: 1.16 10*3/MM3 (ref 0.1–0.9)
MONOCYTES NFR BLD AUTO: 12.4 % (ref 5–12)
NEUTROPHILS NFR BLD AUTO: 6.25 10*3/MM3 (ref 1.7–7)
NEUTROPHILS NFR BLD AUTO: 66.9 % (ref 42.7–76)
NRBC BLD AUTO-RTO: 0 /100 WBC (ref 0–0.2)
NT-PROBNP SERPL-MCNC: 61.3 PG/ML (ref 0–450)
PLATELET # BLD AUTO: 159 10*3/MM3 (ref 140–450)
PMV BLD AUTO: 10 FL (ref 6–12)
POTASSIUM SERPL-SCNC: 3.5 MMOL/L (ref 3.5–5.2)
PROT SERPL-MCNC: 8.3 G/DL (ref 6–8.5)
PROTHROMBIN TIME: 15.2 SECONDS (ref 11.5–14)
RBC # BLD AUTO: 5.52 10*6/MM3 (ref 4.14–5.8)
SARS-COV-2 RDRP RESP QL NAA+PROBE: NOT DETECTED
SODIUM SERPL-SCNC: 134 MMOL/L (ref 136–145)
TRIGL SERPL-MCNC: 178 MG/DL (ref 0–150)
TROPONIN T SERPL-MCNC: <0.01 NG/ML (ref 0–0.03)
TROPONIN T SERPL-MCNC: <0.01 NG/ML (ref 0–0.03)
VLDLC SERPL-MCNC: 31 MG/DL (ref 5–40)
WBC # BLD AUTO: 9.35 10*3/MM3 (ref 3.4–10.8)
WHOLE BLOOD HOLD SPECIMEN: NORMAL
WHOLE BLOOD HOLD SPECIMEN: NORMAL

## 2020-10-20 PROCEDURE — 25010000002 MORPHINE PER 10 MG: Performed by: INTERNAL MEDICINE

## 2020-10-20 PROCEDURE — 83880 ASSAY OF NATRIURETIC PEPTIDE: CPT

## 2020-10-20 PROCEDURE — 99223 1ST HOSP IP/OBS HIGH 75: CPT | Performed by: FAMILY MEDICINE

## 2020-10-20 PROCEDURE — 71045 X-RAY EXAM CHEST 1 VIEW: CPT

## 2020-10-20 PROCEDURE — 80053 COMPREHEN METABOLIC PANEL: CPT

## 2020-10-20 PROCEDURE — 0 IOPAMIDOL PER 1 ML: Performed by: EMERGENCY MEDICINE

## 2020-10-20 PROCEDURE — 93005 ELECTROCARDIOGRAM TRACING: CPT | Performed by: EMERGENCY MEDICINE

## 2020-10-20 PROCEDURE — 93005 ELECTROCARDIOGRAM TRACING: CPT

## 2020-10-20 PROCEDURE — 83690 ASSAY OF LIPASE: CPT

## 2020-10-20 PROCEDURE — 85025 COMPLETE CBC W/AUTO DIFF WBC: CPT

## 2020-10-20 PROCEDURE — 25010000002 THIAMINE PER 100 MG: Performed by: EMERGENCY MEDICINE

## 2020-10-20 PROCEDURE — 25010000002 LORAZEPAM PER 2 MG: Performed by: EMERGENCY MEDICINE

## 2020-10-20 PROCEDURE — 25010000002 ONDANSETRON PER 1 MG: Performed by: NURSE PRACTITIONER

## 2020-10-20 PROCEDURE — 84484 ASSAY OF TROPONIN QUANT: CPT | Performed by: EMERGENCY MEDICINE

## 2020-10-20 PROCEDURE — 99284 EMERGENCY DEPT VISIT MOD MDM: CPT

## 2020-10-20 PROCEDURE — 80061 LIPID PANEL: CPT | Performed by: INTERNAL MEDICINE

## 2020-10-20 PROCEDURE — 84484 ASSAY OF TROPONIN QUANT: CPT

## 2020-10-20 PROCEDURE — 74177 CT ABD & PELVIS W/CONTRAST: CPT

## 2020-10-20 PROCEDURE — 25010000002 MAGNESIUM SULFATE PER 500 MG OF MAGNESIUM: Performed by: EMERGENCY MEDICINE

## 2020-10-20 PROCEDURE — 85610 PROTHROMBIN TIME: CPT | Performed by: PHYSICIAN ASSISTANT

## 2020-10-20 PROCEDURE — 87635 SARS-COV-2 COVID-19 AMP PRB: CPT | Performed by: INTERNAL MEDICINE

## 2020-10-20 PROCEDURE — 25010000002 LORAZEPAM PER 2 MG: Performed by: INTERNAL MEDICINE

## 2020-10-20 PROCEDURE — 80307 DRUG TEST PRSMV CHEM ANLYZR: CPT | Performed by: NURSE PRACTITIONER

## 2020-10-20 PROCEDURE — 71275 CT ANGIOGRAPHY CHEST: CPT

## 2020-10-20 RX ORDER — LORAZEPAM 2 MG/ML
1 INJECTION INTRAMUSCULAR
Status: DISCONTINUED | OUTPATIENT
Start: 2020-10-20 | End: 2020-10-26 | Stop reason: HOSPADM

## 2020-10-20 RX ORDER — LORAZEPAM 2 MG/ML
2 INJECTION INTRAMUSCULAR
Status: DISCONTINUED | OUTPATIENT
Start: 2020-10-20 | End: 2020-10-26 | Stop reason: HOSPADM

## 2020-10-20 RX ORDER — SODIUM CHLORIDE 0.9 % (FLUSH) 0.9 %
10 SYRINGE (ML) INJECTION AS NEEDED
Status: DISCONTINUED | OUTPATIENT
Start: 2020-10-20 | End: 2020-10-26 | Stop reason: HOSPADM

## 2020-10-20 RX ORDER — MORPHINE SULFATE 4 MG/ML
3 INJECTION, SOLUTION INTRAMUSCULAR; INTRAVENOUS ONCE
Status: COMPLETED | OUTPATIENT
Start: 2020-10-20 | End: 2020-10-20

## 2020-10-20 RX ORDER — BISACODYL 5 MG/1
5 TABLET, DELAYED RELEASE ORAL DAILY PRN
Status: DISCONTINUED | OUTPATIENT
Start: 2020-10-20 | End: 2020-10-26 | Stop reason: HOSPADM

## 2020-10-20 RX ORDER — MORPHINE SULFATE 4 MG/ML
3 INJECTION, SOLUTION INTRAMUSCULAR; INTRAVENOUS EVERY 4 HOURS PRN
Status: DISCONTINUED | OUTPATIENT
Start: 2020-10-20 | End: 2020-10-22

## 2020-10-20 RX ORDER — ASPIRIN 81 MG/1
324 TABLET, CHEWABLE ORAL ONCE
Status: DISCONTINUED | OUTPATIENT
Start: 2020-10-20 | End: 2020-10-26 | Stop reason: HOSPADM

## 2020-10-20 RX ORDER — ONDANSETRON 2 MG/ML
4 INJECTION INTRAMUSCULAR; INTRAVENOUS ONCE
Status: COMPLETED | OUTPATIENT
Start: 2020-10-20 | End: 2020-10-20

## 2020-10-20 RX ORDER — LORAZEPAM 1 MG/1
1 TABLET ORAL
Status: DISCONTINUED | OUTPATIENT
Start: 2020-10-20 | End: 2020-10-26 | Stop reason: HOSPADM

## 2020-10-20 RX ORDER — SODIUM CHLORIDE 0.9 % (FLUSH) 0.9 %
10 SYRINGE (ML) INJECTION EVERY 12 HOURS SCHEDULED
Status: DISCONTINUED | OUTPATIENT
Start: 2020-10-20 | End: 2020-10-26 | Stop reason: HOSPADM

## 2020-10-20 RX ORDER — SODIUM CHLORIDE, SODIUM LACTATE, POTASSIUM CHLORIDE, CALCIUM CHLORIDE 600; 310; 30; 20 MG/100ML; MG/100ML; MG/100ML; MG/100ML
999 INJECTION, SOLUTION INTRAVENOUS CONTINUOUS
Status: ACTIVE | OUTPATIENT
Start: 2020-10-20 | End: 2020-10-20

## 2020-10-20 RX ORDER — BUSPIRONE HYDROCHLORIDE 10 MG/1
10 TABLET ORAL 2 TIMES DAILY
Status: DISCONTINUED | OUTPATIENT
Start: 2020-10-20 | End: 2020-10-26 | Stop reason: HOSPADM

## 2020-10-20 RX ORDER — SODIUM CHLORIDE, SODIUM LACTATE, POTASSIUM CHLORIDE, CALCIUM CHLORIDE 600; 310; 30; 20 MG/100ML; MG/100ML; MG/100ML; MG/100ML
200 INJECTION, SOLUTION INTRAVENOUS CONTINUOUS
Status: ACTIVE | OUTPATIENT
Start: 2020-10-20 | End: 2020-10-21

## 2020-10-20 RX ORDER — LORAZEPAM 1 MG/1
2 TABLET ORAL
Status: DISCONTINUED | OUTPATIENT
Start: 2020-10-20 | End: 2020-10-26 | Stop reason: HOSPADM

## 2020-10-20 RX ORDER — ONDANSETRON 2 MG/ML
4 INJECTION INTRAMUSCULAR; INTRAVENOUS EVERY 6 HOURS PRN
Status: DISCONTINUED | OUTPATIENT
Start: 2020-10-20 | End: 2020-10-26 | Stop reason: HOSPADM

## 2020-10-20 RX ORDER — CHLORDIAZEPOXIDE HYDROCHLORIDE 25 MG/1
25 CAPSULE, GELATIN COATED ORAL EVERY 6 HOURS SCHEDULED
Status: DISCONTINUED | OUTPATIENT
Start: 2020-10-21 | End: 2020-10-21

## 2020-10-20 RX ORDER — FAMOTIDINE 10 MG/ML
20 INJECTION, SOLUTION INTRAVENOUS ONCE
Status: COMPLETED | OUTPATIENT
Start: 2020-10-20 | End: 2020-10-20

## 2020-10-20 RX ORDER — PANTOPRAZOLE SODIUM 40 MG/10ML
40 INJECTION, POWDER, LYOPHILIZED, FOR SOLUTION INTRAVENOUS
Status: DISCONTINUED | OUTPATIENT
Start: 2020-10-21 | End: 2020-10-23

## 2020-10-20 RX ORDER — LORAZEPAM 2 MG/ML
1 INJECTION INTRAMUSCULAR ONCE
Status: COMPLETED | OUTPATIENT
Start: 2020-10-20 | End: 2020-10-20

## 2020-10-20 RX ORDER — ONDANSETRON 4 MG/1
4 TABLET, FILM COATED ORAL EVERY 6 HOURS PRN
Status: DISCONTINUED | OUTPATIENT
Start: 2020-10-20 | End: 2020-10-26 | Stop reason: HOSPADM

## 2020-10-20 RX ORDER — MORPHINE SULFATE 4 MG/ML
4 INJECTION, SOLUTION INTRAMUSCULAR; INTRAVENOUS ONCE
Status: DISCONTINUED | OUTPATIENT
Start: 2020-10-20 | End: 2020-10-20

## 2020-10-20 RX ADMIN — MAGNESIUM SULFATE HEPTAHYDRATE 1000 ML/HR: 500 INJECTION, SOLUTION INTRAMUSCULAR; INTRAVENOUS at 21:16

## 2020-10-20 RX ADMIN — LORAZEPAM 1 MG: 2 INJECTION INTRAMUSCULAR; INTRAVENOUS at 20:55

## 2020-10-20 RX ADMIN — FAMOTIDINE 20 MG: 10 INJECTION INTRAVENOUS at 20:41

## 2020-10-20 RX ADMIN — SODIUM CHLORIDE 1000 ML: 9 INJECTION, SOLUTION INTRAVENOUS at 20:41

## 2020-10-20 RX ADMIN — IOPAMIDOL 95 ML: 755 INJECTION, SOLUTION INTRAVENOUS at 20:20

## 2020-10-20 RX ADMIN — CHLORDIAZEPOXIDE HYDROCHLORIDE 25 MG: 25 CAPSULE ORAL at 22:07

## 2020-10-20 RX ADMIN — MORPHINE SULFATE 3 MG: 4 INJECTION, SOLUTION INTRAMUSCULAR; INTRAVENOUS at 23:25

## 2020-10-20 RX ADMIN — SODIUM CHLORIDE, POTASSIUM CHLORIDE, SODIUM LACTATE AND CALCIUM CHLORIDE 200 ML/HR: 600; 310; 30; 20 INJECTION, SOLUTION INTRAVENOUS at 23:31

## 2020-10-20 RX ADMIN — LORAZEPAM 1 MG: 2 INJECTION INTRAMUSCULAR; INTRAVENOUS at 22:02

## 2020-10-20 RX ADMIN — ONDANSETRON 4 MG: 2 INJECTION INTRAMUSCULAR; INTRAVENOUS at 20:41

## 2020-10-20 RX ADMIN — BUSPIRONE HYDROCHLORIDE 10 MG: 10 TABLET ORAL at 23:25

## 2020-10-20 RX ADMIN — SODIUM CHLORIDE, PRESERVATIVE FREE 10 ML: 5 INJECTION INTRAVENOUS at 23:31

## 2020-10-20 RX ADMIN — LORAZEPAM 1 MG: 2 INJECTION INTRAMUSCULAR; INTRAVENOUS at 23:26

## 2020-10-21 LAB
ALBUMIN SERPL-MCNC: 3.9 G/DL (ref 3.5–5.2)
ALBUMIN/GLOB SERPL: 1.2 G/DL
ALP SERPL-CCNC: 186 U/L (ref 39–117)
ALT SERPL W P-5'-P-CCNC: 42 U/L (ref 1–41)
AMPHET+METHAMPHET UR QL: NEGATIVE
AMPHETAMINES UR QL: NEGATIVE
ANION GAP SERPL CALCULATED.3IONS-SCNC: 10 MMOL/L (ref 5–15)
AST SERPL-CCNC: 66 U/L (ref 1–40)
BARBITURATES UR QL SCN: NEGATIVE
BASOPHILS # BLD AUTO: 0.05 10*3/MM3 (ref 0–0.2)
BASOPHILS NFR BLD AUTO: 0.5 % (ref 0–1.5)
BENZODIAZ UR QL SCN: POSITIVE
BILIRUB SERPL-MCNC: 1.3 MG/DL (ref 0–1.2)
BILIRUB UR QL STRIP: NEGATIVE
BUN SERPL-MCNC: 5 MG/DL (ref 6–20)
BUN/CREAT SERPL: 7.8 (ref 7–25)
BUPRENORPHINE SERPL-MCNC: POSITIVE NG/ML
CALCIUM SPEC-SCNC: 8.8 MG/DL (ref 8.6–10.5)
CANNABINOIDS SERPL QL: NEGATIVE
CHLORIDE SERPL-SCNC: 109 MMOL/L (ref 98–107)
CLARITY UR: CLEAR
CO2 SERPL-SCNC: 21 MMOL/L (ref 22–29)
COCAINE UR QL: NEGATIVE
COLOR UR: YELLOW
CREAT SERPL-MCNC: 0.64 MG/DL (ref 0.76–1.27)
DEPRECATED RDW RBC AUTO: 45.3 FL (ref 37–54)
EOSINOPHIL # BLD AUTO: 0.36 10*3/MM3 (ref 0–0.4)
EOSINOPHIL NFR BLD AUTO: 3.9 % (ref 0.3–6.2)
ERYTHROCYTE [DISTWIDTH] IN BLOOD BY AUTOMATED COUNT: 13.3 % (ref 12.3–15.4)
GFR SERPL CREATININE-BSD FRML MDRD: 146 ML/MIN/1.73
GLOBULIN UR ELPH-MCNC: 3.3 GM/DL
GLUCOSE SERPL-MCNC: 95 MG/DL (ref 65–99)
GLUCOSE UR STRIP-MCNC: NEGATIVE MG/DL
HCT VFR BLD AUTO: 45.3 % (ref 37.5–51)
HGB BLD-MCNC: 14.6 G/DL (ref 13–17.7)
HGB UR QL STRIP.AUTO: NEGATIVE
IMM GRANULOCYTES # BLD AUTO: 0.03 10*3/MM3 (ref 0–0.05)
IMM GRANULOCYTES NFR BLD AUTO: 0.3 % (ref 0–0.5)
KETONES UR QL STRIP: ABNORMAL
LEUKOCYTE ESTERASE UR QL STRIP.AUTO: NEGATIVE
LYMPHOCYTES # BLD AUTO: 2.04 10*3/MM3 (ref 0.7–3.1)
LYMPHOCYTES NFR BLD AUTO: 22.2 % (ref 19.6–45.3)
MAGNESIUM SERPL-MCNC: 2.3 MG/DL (ref 1.6–2.6)
MCH RBC QN AUTO: 29.9 PG (ref 26.6–33)
MCHC RBC AUTO-ENTMCNC: 32.2 G/DL (ref 31.5–35.7)
MCV RBC AUTO: 92.8 FL (ref 79–97)
METHADONE UR QL SCN: NEGATIVE
MONOCYTES # BLD AUTO: 1.12 10*3/MM3 (ref 0.1–0.9)
MONOCYTES NFR BLD AUTO: 12.2 % (ref 5–12)
NEUTROPHILS NFR BLD AUTO: 5.57 10*3/MM3 (ref 1.7–7)
NEUTROPHILS NFR BLD AUTO: 60.9 % (ref 42.7–76)
NITRITE UR QL STRIP: NEGATIVE
NRBC BLD AUTO-RTO: 0 /100 WBC (ref 0–0.2)
OPIATES UR QL: POSITIVE
OXYCODONE UR QL SCN: NEGATIVE
PCP UR QL SCN: NEGATIVE
PH UR STRIP.AUTO: 5.5 [PH] (ref 5–8)
PLATELET # BLD AUTO: 162 10*3/MM3 (ref 140–450)
PMV BLD AUTO: 10.7 FL (ref 6–12)
POTASSIUM SERPL-SCNC: 3.7 MMOL/L (ref 3.5–5.2)
PROPOXYPH UR QL: NEGATIVE
PROT SERPL-MCNC: 7.2 G/DL (ref 6–8.5)
PROT UR QL STRIP: NEGATIVE
RBC # BLD AUTO: 4.88 10*6/MM3 (ref 4.14–5.8)
SODIUM SERPL-SCNC: 140 MMOL/L (ref 136–145)
SP GR UR STRIP: 1.03 (ref 1–1.03)
TRICYCLICS UR QL SCN: POSITIVE
UROBILINOGEN UR QL STRIP: ABNORMAL
WBC # BLD AUTO: 9.17 10*3/MM3 (ref 3.4–10.8)

## 2020-10-21 PROCEDURE — 80053 COMPREHEN METABOLIC PANEL: CPT | Performed by: PHYSICIAN ASSISTANT

## 2020-10-21 PROCEDURE — 25010000002 DIAZEPAM PER 5 MG: Performed by: INTERNAL MEDICINE

## 2020-10-21 PROCEDURE — 85025 COMPLETE CBC W/AUTO DIFF WBC: CPT | Performed by: PHYSICIAN ASSISTANT

## 2020-10-21 PROCEDURE — 99233 SBSQ HOSP IP/OBS HIGH 50: CPT | Performed by: INTERNAL MEDICINE

## 2020-10-21 PROCEDURE — 25010000002 THIAMINE PER 100 MG: Performed by: INTERNAL MEDICINE

## 2020-10-21 PROCEDURE — 25010000002 LORAZEPAM PER 2 MG: Performed by: INTERNAL MEDICINE

## 2020-10-21 PROCEDURE — 80306 DRUG TEST PRSMV INSTRMNT: CPT | Performed by: PHYSICIAN ASSISTANT

## 2020-10-21 PROCEDURE — 83735 ASSAY OF MAGNESIUM: CPT | Performed by: PHYSICIAN ASSISTANT

## 2020-10-21 PROCEDURE — 25010000002 MORPHINE PER 10 MG: Performed by: INTERNAL MEDICINE

## 2020-10-21 PROCEDURE — 81003 URINALYSIS AUTO W/O SCOPE: CPT | Performed by: PHYSICIAN ASSISTANT

## 2020-10-21 RX ORDER — DOXEPIN HYDROCHLORIDE 10 MG/1
CAPSULE ORAL NIGHTLY
COMMUNITY
End: 2021-03-07

## 2020-10-21 RX ORDER — CHLORDIAZEPOXIDE HYDROCHLORIDE 25 MG/1
25 CAPSULE, GELATIN COATED ORAL ONCE
Status: COMPLETED | OUTPATIENT
Start: 2020-10-21 | End: 2020-10-21

## 2020-10-21 RX ORDER — DIAZEPAM 5 MG/ML
10 INJECTION, SOLUTION INTRAMUSCULAR; INTRAVENOUS ONCE
Status: COMPLETED | OUTPATIENT
Start: 2020-10-21 | End: 2020-10-21

## 2020-10-21 RX ORDER — CHLORDIAZEPOXIDE HYDROCHLORIDE 25 MG/1
50 CAPSULE, GELATIN COATED ORAL EVERY 6 HOURS SCHEDULED
Status: DISCONTINUED | OUTPATIENT
Start: 2020-10-21 | End: 2020-10-24

## 2020-10-21 RX ORDER — NICOTINE 21 MG/24HR
1 PATCH, TRANSDERMAL 24 HOURS TRANSDERMAL
Status: DISCONTINUED | OUTPATIENT
Start: 2020-10-21 | End: 2020-10-26 | Stop reason: HOSPADM

## 2020-10-21 RX ADMIN — LORAZEPAM 2 MG: 1 TABLET ORAL at 02:56

## 2020-10-21 RX ADMIN — FOLIC ACID 100 ML/HR: 5 INJECTION, SOLUTION INTRAMUSCULAR; INTRAVENOUS; SUBCUTANEOUS at 08:30

## 2020-10-21 RX ADMIN — LORAZEPAM 2 MG: 2 INJECTION INTRAMUSCULAR; INTRAVENOUS at 22:33

## 2020-10-21 RX ADMIN — MORPHINE SULFATE 3 MG: 4 INJECTION, SOLUTION INTRAMUSCULAR; INTRAVENOUS at 08:29

## 2020-10-21 RX ADMIN — SODIUM CHLORIDE, PRESERVATIVE FREE 10 ML: 5 INJECTION INTRAVENOUS at 08:31

## 2020-10-21 RX ADMIN — LORAZEPAM 1 MG: 2 INJECTION INTRAMUSCULAR; INTRAVENOUS at 05:59

## 2020-10-21 RX ADMIN — LORAZEPAM 2 MG: 2 INJECTION INTRAMUSCULAR; INTRAVENOUS at 22:11

## 2020-10-21 RX ADMIN — MORPHINE SULFATE 3 MG: 4 INJECTION, SOLUTION INTRAMUSCULAR; INTRAVENOUS at 23:00

## 2020-10-21 RX ADMIN — LORAZEPAM 2 MG: 2 INJECTION INTRAMUSCULAR; INTRAVENOUS at 15:39

## 2020-10-21 RX ADMIN — LORAZEPAM 2 MG: 2 INJECTION INTRAMUSCULAR; INTRAVENOUS at 21:24

## 2020-10-21 RX ADMIN — LORAZEPAM 2 MG: 2 INJECTION INTRAMUSCULAR; INTRAVENOUS at 19:47

## 2020-10-21 RX ADMIN — SODIUM CHLORIDE, PRESERVATIVE FREE 10 ML: 5 INJECTION INTRAVENOUS at 21:24

## 2020-10-21 RX ADMIN — CHLORDIAZEPOXIDE HYDROCHLORIDE 50 MG: 25 CAPSULE ORAL at 17:36

## 2020-10-21 RX ADMIN — LORAZEPAM 2 MG: 2 INJECTION INTRAMUSCULAR; INTRAVENOUS at 08:29

## 2020-10-21 RX ADMIN — LORAZEPAM 2 MG: 2 INJECTION INTRAMUSCULAR; INTRAVENOUS at 01:19

## 2020-10-21 RX ADMIN — Medication 1 PATCH: at 00:08

## 2020-10-21 RX ADMIN — MORPHINE SULFATE 3 MG: 4 INJECTION, SOLUTION INTRAMUSCULAR; INTRAVENOUS at 19:26

## 2020-10-21 RX ADMIN — SODIUM CHLORIDE, POTASSIUM CHLORIDE, SODIUM LACTATE AND CALCIUM CHLORIDE 200 ML/HR: 600; 310; 30; 20 INJECTION, SOLUTION INTRAVENOUS at 04:47

## 2020-10-21 RX ADMIN — LORAZEPAM 2 MG: 2 INJECTION INTRAMUSCULAR; INTRAVENOUS at 20:05

## 2020-10-21 RX ADMIN — PANTOPRAZOLE SODIUM 40 MG: 40 INJECTION, POWDER, FOR SOLUTION INTRAVENOUS at 05:59

## 2020-10-21 RX ADMIN — LORAZEPAM 2 MG: 2 INJECTION INTRAMUSCULAR; INTRAVENOUS at 10:20

## 2020-10-21 RX ADMIN — CHLORDIAZEPOXIDE HYDROCHLORIDE 25 MG: 25 CAPSULE ORAL at 05:59

## 2020-10-21 RX ADMIN — BUSPIRONE HYDROCHLORIDE 10 MG: 10 TABLET ORAL at 08:30

## 2020-10-21 RX ADMIN — CHLORDIAZEPOXIDE HYDROCHLORIDE 25 MG: 25 CAPSULE ORAL at 15:49

## 2020-10-21 RX ADMIN — MORPHINE SULFATE 3 MG: 4 INJECTION, SOLUTION INTRAMUSCULAR; INTRAVENOUS at 04:23

## 2020-10-21 RX ADMIN — CHLORDIAZEPOXIDE HYDROCHLORIDE 25 MG: 25 CAPSULE ORAL at 12:32

## 2020-10-21 RX ADMIN — LORAZEPAM 2 MG: 1 TABLET ORAL at 19:26

## 2020-10-21 RX ADMIN — BUSPIRONE HYDROCHLORIDE 10 MG: 10 TABLET ORAL at 19:27

## 2020-10-21 RX ADMIN — LORAZEPAM 2 MG: 2 INJECTION INTRAMUSCULAR; INTRAVENOUS at 11:59

## 2020-10-21 RX ADMIN — LORAZEPAM 2 MG: 2 INJECTION INTRAMUSCULAR; INTRAVENOUS at 14:49

## 2020-10-21 RX ADMIN — DIAZEPAM 10 MG: 10 INJECTION, SOLUTION INTRAMUSCULAR; INTRAVENOUS at 15:49

## 2020-10-21 RX ADMIN — CHLORDIAZEPOXIDE HYDROCHLORIDE 50 MG: 25 CAPSULE ORAL at 23:01

## 2020-10-22 LAB
ALBUMIN SERPL-MCNC: 3.8 G/DL (ref 3.5–5.2)
ALBUMIN/GLOB SERPL: 1.2 G/DL
ALP SERPL-CCNC: 159 U/L (ref 39–117)
ALT SERPL W P-5'-P-CCNC: 34 U/L (ref 1–41)
ANION GAP SERPL CALCULATED.3IONS-SCNC: 11 MMOL/L (ref 5–15)
AST SERPL-CCNC: 59 U/L (ref 1–40)
BILIRUB SERPL-MCNC: 1.5 MG/DL (ref 0–1.2)
BUN SERPL-MCNC: 5 MG/DL (ref 6–20)
BUN/CREAT SERPL: 8.1 (ref 7–25)
CALCIUM SPEC-SCNC: 9.2 MG/DL (ref 8.6–10.5)
CHLORIDE SERPL-SCNC: 106 MMOL/L (ref 98–107)
CO2 SERPL-SCNC: 22 MMOL/L (ref 22–29)
CREAT SERPL-MCNC: 0.62 MG/DL (ref 0.76–1.27)
GFR SERPL CREATININE-BSD FRML MDRD: >150 ML/MIN/1.73
GLOBULIN UR ELPH-MCNC: 3.3 GM/DL
GLUCOSE SERPL-MCNC: 86 MG/DL (ref 65–99)
INR PPP: 1.34 (ref 0.85–1.16)
POTASSIUM SERPL-SCNC: 3.8 MMOL/L (ref 3.5–5.2)
PROT SERPL-MCNC: 7.1 G/DL (ref 6–8.5)
PROTHROMBIN TIME: 16.3 SECONDS (ref 11.5–14)
SODIUM SERPL-SCNC: 139 MMOL/L (ref 136–145)

## 2020-10-22 PROCEDURE — 25010000002 THIAMINE PER 100 MG: Performed by: INTERNAL MEDICINE

## 2020-10-22 PROCEDURE — 25010000002 LORAZEPAM PER 2 MG: Performed by: INTERNAL MEDICINE

## 2020-10-22 PROCEDURE — 80053 COMPREHEN METABOLIC PANEL: CPT | Performed by: INTERNAL MEDICINE

## 2020-10-22 PROCEDURE — 25010000002 MORPHINE PER 10 MG: Performed by: HOSPITALIST

## 2020-10-22 PROCEDURE — 99233 SBSQ HOSP IP/OBS HIGH 50: CPT | Performed by: HOSPITALIST

## 2020-10-22 PROCEDURE — 25010000002 MORPHINE PER 10 MG: Performed by: INTERNAL MEDICINE

## 2020-10-22 PROCEDURE — 85610 PROTHROMBIN TIME: CPT | Performed by: INTERNAL MEDICINE

## 2020-10-22 RX ORDER — ACETAMINOPHEN 325 MG/1
650 TABLET ORAL EVERY 6 HOURS PRN
Status: DISCONTINUED | OUTPATIENT
Start: 2020-10-22 | End: 2020-10-26 | Stop reason: HOSPADM

## 2020-10-22 RX ORDER — MORPHINE SULFATE 2 MG/ML
2 INJECTION, SOLUTION INTRAMUSCULAR; INTRAVENOUS EVERY 4 HOURS PRN
Status: DISCONTINUED | OUTPATIENT
Start: 2020-10-22 | End: 2020-10-23

## 2020-10-22 RX ORDER — HYDROCODONE BITARTRATE AND ACETAMINOPHEN 5; 325 MG/1; MG/1
1 TABLET ORAL EVERY 8 HOURS PRN
Status: DISCONTINUED | OUTPATIENT
Start: 2020-10-22 | End: 2020-10-25

## 2020-10-22 RX ADMIN — LORAZEPAM 1 MG: 1 TABLET ORAL at 19:56

## 2020-10-22 RX ADMIN — LORAZEPAM 1 MG: 1 TABLET ORAL at 23:54

## 2020-10-22 RX ADMIN — LORAZEPAM 1 MG: 1 TABLET ORAL at 09:42

## 2020-10-22 RX ADMIN — BUSPIRONE HYDROCHLORIDE 10 MG: 10 TABLET ORAL at 20:02

## 2020-10-22 RX ADMIN — LORAZEPAM 2 MG: 2 INJECTION INTRAMUSCULAR; INTRAVENOUS at 05:05

## 2020-10-22 RX ADMIN — CHLORDIAZEPOXIDE HYDROCHLORIDE 50 MG: 25 CAPSULE ORAL at 05:56

## 2020-10-22 RX ADMIN — SODIUM CHLORIDE, PRESERVATIVE FREE 10 ML: 5 INJECTION INTRAVENOUS at 09:43

## 2020-10-22 RX ADMIN — HYDROCODONE BITARTRATE AND ACETAMINOPHEN 1 TABLET: 5; 325 TABLET ORAL at 09:42

## 2020-10-22 RX ADMIN — PANTOPRAZOLE SODIUM 40 MG: 40 INJECTION, POWDER, FOR SOLUTION INTRAVENOUS at 06:00

## 2020-10-22 RX ADMIN — BUSPIRONE HYDROCHLORIDE 10 MG: 10 TABLET ORAL at 09:42

## 2020-10-22 RX ADMIN — MORPHINE SULFATE 2 MG: 2 INJECTION, SOLUTION INTRAMUSCULAR; INTRAVENOUS at 23:54

## 2020-10-22 RX ADMIN — MORPHINE SULFATE 3 MG: 4 INJECTION, SOLUTION INTRAMUSCULAR; INTRAVENOUS at 06:00

## 2020-10-22 RX ADMIN — LORAZEPAM 1 MG: 1 TABLET ORAL at 13:03

## 2020-10-22 RX ADMIN — FOLIC ACID 100 ML/HR: 5 INJECTION, SOLUTION INTRAMUSCULAR; INTRAVENOUS; SUBCUTANEOUS at 09:42

## 2020-10-22 RX ADMIN — Medication 1 PATCH: at 09:41

## 2020-10-22 RX ADMIN — MORPHINE SULFATE 2 MG: 2 INJECTION, SOLUTION INTRAMUSCULAR; INTRAVENOUS at 17:25

## 2020-10-22 RX ADMIN — SODIUM CHLORIDE, PRESERVATIVE FREE 10 ML: 5 INJECTION INTRAVENOUS at 20:06

## 2020-10-22 RX ADMIN — CHLORDIAZEPOXIDE HYDROCHLORIDE 50 MG: 25 CAPSULE ORAL at 13:02

## 2020-10-22 RX ADMIN — CHLORDIAZEPOXIDE HYDROCHLORIDE 50 MG: 25 CAPSULE ORAL at 23:54

## 2020-10-22 RX ADMIN — LORAZEPAM 2 MG: 1 TABLET ORAL at 05:56

## 2020-10-22 RX ADMIN — LORAZEPAM 1 MG: 1 TABLET ORAL at 17:25

## 2020-10-22 RX ADMIN — LORAZEPAM 2 MG: 2 INJECTION INTRAMUSCULAR; INTRAVENOUS at 02:09

## 2020-10-22 RX ADMIN — MORPHINE SULFATE 3 MG: 4 INJECTION, SOLUTION INTRAMUSCULAR; INTRAVENOUS at 02:37

## 2020-10-22 RX ADMIN — CHLORDIAZEPOXIDE HYDROCHLORIDE 50 MG: 25 CAPSULE ORAL at 17:25

## 2020-10-22 RX ADMIN — LORAZEPAM 2 MG: 2 INJECTION INTRAMUSCULAR; INTRAVENOUS at 04:36

## 2020-10-22 RX ADMIN — HYDROCODONE BITARTRATE AND ACETAMINOPHEN 1 TABLET: 5; 325 TABLET ORAL at 19:56

## 2020-10-22 RX ADMIN — LORAZEPAM 2 MG: 2 INJECTION INTRAMUSCULAR; INTRAVENOUS at 02:37

## 2020-10-23 LAB
ALBUMIN SERPL-MCNC: 3.8 G/DL (ref 3.5–5.2)
ALBUMIN/GLOB SERPL: 1.1 G/DL
ALP SERPL-CCNC: 168 U/L (ref 39–117)
ALT SERPL W P-5'-P-CCNC: 33 U/L (ref 1–41)
ANION GAP SERPL CALCULATED.3IONS-SCNC: 10 MMOL/L (ref 5–15)
AST SERPL-CCNC: 53 U/L (ref 1–40)
BILIRUB SERPL-MCNC: 0.8 MG/DL (ref 0–1.2)
BUN SERPL-MCNC: 4 MG/DL (ref 6–20)
BUN/CREAT SERPL: 4.9 (ref 7–25)
CALCIUM SPEC-SCNC: 9.2 MG/DL (ref 8.6–10.5)
CHLORIDE SERPL-SCNC: 107 MMOL/L (ref 98–107)
CO2 SERPL-SCNC: 23 MMOL/L (ref 22–29)
CREAT SERPL-MCNC: 0.81 MG/DL (ref 0.76–1.27)
DEPRECATED RDW RBC AUTO: 46.7 FL (ref 37–54)
ERYTHROCYTE [DISTWIDTH] IN BLOOD BY AUTOMATED COUNT: 13.4 % (ref 12.3–15.4)
GFR SERPL CREATININE-BSD FRML MDRD: 111 ML/MIN/1.73
GLOBULIN UR ELPH-MCNC: 3.4 GM/DL
GLUCOSE SERPL-MCNC: 98 MG/DL (ref 65–99)
HCT VFR BLD AUTO: 45.9 % (ref 37.5–51)
HGB BLD-MCNC: 14.9 G/DL (ref 13–17.7)
INR PPP: 1.16 (ref 0.85–1.16)
LIPASE SERPL-CCNC: 65 U/L (ref 13–60)
MCH RBC QN AUTO: 31 PG (ref 26.6–33)
MCHC RBC AUTO-ENTMCNC: 32.5 G/DL (ref 31.5–35.7)
MCV RBC AUTO: 95.4 FL (ref 79–97)
PLATELET # BLD AUTO: 142 10*3/MM3 (ref 140–450)
PMV BLD AUTO: 11.2 FL (ref 6–12)
POTASSIUM SERPL-SCNC: 3.7 MMOL/L (ref 3.5–5.2)
PROT SERPL-MCNC: 7.2 G/DL (ref 6–8.5)
PROTHROMBIN TIME: 14.5 SECONDS (ref 11.5–14)
RBC # BLD AUTO: 4.81 10*6/MM3 (ref 4.14–5.8)
SODIUM SERPL-SCNC: 140 MMOL/L (ref 136–145)
WBC # BLD AUTO: 6.36 10*3/MM3 (ref 3.4–10.8)

## 2020-10-23 PROCEDURE — 25010000002 THIAMINE PER 100 MG: Performed by: INTERNAL MEDICINE

## 2020-10-23 PROCEDURE — 25010000002 MORPHINE PER 10 MG: Performed by: INTERNAL MEDICINE

## 2020-10-23 PROCEDURE — 85610 PROTHROMBIN TIME: CPT | Performed by: HOSPITALIST

## 2020-10-23 PROCEDURE — 85027 COMPLETE CBC AUTOMATED: CPT | Performed by: HOSPITALIST

## 2020-10-23 PROCEDURE — 80053 COMPREHEN METABOLIC PANEL: CPT | Performed by: HOSPITALIST

## 2020-10-23 PROCEDURE — 99232 SBSQ HOSP IP/OBS MODERATE 35: CPT | Performed by: INTERNAL MEDICINE

## 2020-10-23 PROCEDURE — 83690 ASSAY OF LIPASE: CPT | Performed by: HOSPITALIST

## 2020-10-23 RX ORDER — PANTOPRAZOLE SODIUM 40 MG/1
40 TABLET, DELAYED RELEASE ORAL
Status: DISCONTINUED | OUTPATIENT
Start: 2020-10-24 | End: 2020-10-26 | Stop reason: HOSPADM

## 2020-10-23 RX ORDER — PANTOPRAZOLE SODIUM 40 MG/1
40 TABLET, DELAYED RELEASE ORAL
Status: DISCONTINUED | OUTPATIENT
Start: 2020-10-23 | End: 2020-10-23

## 2020-10-23 RX ORDER — MORPHINE SULFATE 2 MG/ML
1 INJECTION, SOLUTION INTRAMUSCULAR; INTRAVENOUS EVERY 4 HOURS PRN
Status: DISCONTINUED | OUTPATIENT
Start: 2020-10-23 | End: 2020-10-24

## 2020-10-23 RX ADMIN — HYDROCODONE BITARTRATE AND ACETAMINOPHEN 1 TABLET: 5; 325 TABLET ORAL at 15:32

## 2020-10-23 RX ADMIN — LORAZEPAM 1 MG: 1 TABLET ORAL at 08:25

## 2020-10-23 RX ADMIN — LORAZEPAM 1 MG: 1 TABLET ORAL at 02:39

## 2020-10-23 RX ADMIN — MORPHINE SULFATE 1 MG: 2 INJECTION, SOLUTION INTRAMUSCULAR; INTRAVENOUS at 08:26

## 2020-10-23 RX ADMIN — LORAZEPAM 1 MG: 1 TABLET ORAL at 05:06

## 2020-10-23 RX ADMIN — ACETAMINOPHEN 650 MG: 325 TABLET, FILM COATED ORAL at 07:15

## 2020-10-23 RX ADMIN — PANTOPRAZOLE SODIUM 40 MG: 40 INJECTION, POWDER, FOR SOLUTION INTRAVENOUS at 05:06

## 2020-10-23 RX ADMIN — CHLORDIAZEPOXIDE HYDROCHLORIDE 50 MG: 25 CAPSULE ORAL at 15:27

## 2020-10-23 RX ADMIN — FOLIC ACID 100 ML/HR: 5 INJECTION, SOLUTION INTRAMUSCULAR; INTRAVENOUS; SUBCUTANEOUS at 08:03

## 2020-10-23 RX ADMIN — BUSPIRONE HYDROCHLORIDE 10 MG: 10 TABLET ORAL at 19:21

## 2020-10-23 RX ADMIN — MORPHINE SULFATE 1 MG: 2 INJECTION, SOLUTION INTRAMUSCULAR; INTRAVENOUS at 21:35

## 2020-10-23 RX ADMIN — MORPHINE SULFATE 1 MG: 2 INJECTION, SOLUTION INTRAMUSCULAR; INTRAVENOUS at 17:15

## 2020-10-23 RX ADMIN — LORAZEPAM 1 MG: 1 TABLET ORAL at 16:23

## 2020-10-23 RX ADMIN — SODIUM CHLORIDE, PRESERVATIVE FREE 10 ML: 5 INJECTION INTRAVENOUS at 19:21

## 2020-10-23 RX ADMIN — SODIUM CHLORIDE, PRESERVATIVE FREE 10 ML: 5 INJECTION INTRAVENOUS at 08:13

## 2020-10-23 RX ADMIN — HYDROCODONE BITARTRATE AND ACETAMINOPHEN 1 TABLET: 5; 325 TABLET ORAL at 05:05

## 2020-10-23 RX ADMIN — HYDROCODONE BITARTRATE AND ACETAMINOPHEN 1 TABLET: 5; 325 TABLET ORAL at 23:27

## 2020-10-23 RX ADMIN — BUSPIRONE HYDROCHLORIDE 10 MG: 10 TABLET ORAL at 08:03

## 2020-10-23 RX ADMIN — LORAZEPAM 1 MG: 1 TABLET ORAL at 21:32

## 2020-10-23 RX ADMIN — CHLORDIAZEPOXIDE HYDROCHLORIDE 50 MG: 25 CAPSULE ORAL at 05:05

## 2020-10-23 RX ADMIN — LORAZEPAM 1 MG: 1 TABLET ORAL at 12:07

## 2020-10-23 RX ADMIN — CHLORDIAZEPOXIDE HYDROCHLORIDE 50 MG: 25 CAPSULE ORAL at 18:23

## 2020-10-23 RX ADMIN — CHLORDIAZEPOXIDE HYDROCHLORIDE 50 MG: 25 CAPSULE ORAL at 23:27

## 2020-10-23 RX ADMIN — LORAZEPAM 1 MG: 1 TABLET ORAL at 19:21

## 2020-10-23 RX ADMIN — ACETAMINOPHEN 650 MG: 325 TABLET, FILM COATED ORAL at 15:36

## 2020-10-23 RX ADMIN — Medication 1 PATCH: at 08:03

## 2020-10-23 RX ADMIN — MORPHINE SULFATE 1 MG: 2 INJECTION, SOLUTION INTRAMUSCULAR; INTRAVENOUS at 13:13

## 2020-10-24 PROCEDURE — 25010000002 MORPHINE PER 10 MG: Performed by: INTERNAL MEDICINE

## 2020-10-24 PROCEDURE — 99232 SBSQ HOSP IP/OBS MODERATE 35: CPT | Performed by: INTERNAL MEDICINE

## 2020-10-24 RX ORDER — IBUPROFEN 400 MG/1
600 TABLET ORAL ONCE
Status: COMPLETED | OUTPATIENT
Start: 2020-10-24 | End: 2020-10-24

## 2020-10-24 RX ORDER — CHLORDIAZEPOXIDE HYDROCHLORIDE 25 MG/1
25 CAPSULE, GELATIN COATED ORAL EVERY 8 HOURS SCHEDULED
Status: DISCONTINUED | OUTPATIENT
Start: 2020-10-24 | End: 2020-10-26

## 2020-10-24 RX ORDER — MORPHINE SULFATE 2 MG/ML
1 INJECTION, SOLUTION INTRAMUSCULAR; INTRAVENOUS EVERY 6 HOURS PRN
Status: DISCONTINUED | OUTPATIENT
Start: 2020-10-24 | End: 2020-10-25

## 2020-10-24 RX ORDER — CHLORDIAZEPOXIDE HYDROCHLORIDE 25 MG/1
25 CAPSULE, GELATIN COATED ORAL EVERY 6 HOURS SCHEDULED
Status: DISCONTINUED | OUTPATIENT
Start: 2020-10-24 | End: 2020-10-24

## 2020-10-24 RX ORDER — IBUPROFEN 400 MG/1
400 TABLET ORAL EVERY 4 HOURS PRN
Status: DISCONTINUED | OUTPATIENT
Start: 2020-10-24 | End: 2020-10-26 | Stop reason: HOSPADM

## 2020-10-24 RX ADMIN — HYDROCODONE BITARTRATE AND ACETAMINOPHEN 1 TABLET: 5; 325 TABLET ORAL at 16:06

## 2020-10-24 RX ADMIN — LORAZEPAM 1 MG: 1 TABLET ORAL at 09:20

## 2020-10-24 RX ADMIN — LORAZEPAM 1 MG: 1 TABLET ORAL at 06:49

## 2020-10-24 RX ADMIN — CHLORDIAZEPOXIDE HYDROCHLORIDE 50 MG: 25 CAPSULE ORAL at 05:39

## 2020-10-24 RX ADMIN — LORAZEPAM 1 MG: 1 TABLET ORAL at 16:15

## 2020-10-24 RX ADMIN — SODIUM CHLORIDE, PRESERVATIVE FREE 10 ML: 5 INJECTION INTRAVENOUS at 20:29

## 2020-10-24 RX ADMIN — IBUPROFEN 400 MG: 400 TABLET ORAL at 20:28

## 2020-10-24 RX ADMIN — CHLORDIAZEPOXIDE HYDROCHLORIDE 25 MG: 25 CAPSULE ORAL at 15:01

## 2020-10-24 RX ADMIN — Medication 1 PATCH: at 09:19

## 2020-10-24 RX ADMIN — LORAZEPAM 1 MG: 1 TABLET ORAL at 12:51

## 2020-10-24 RX ADMIN — BUSPIRONE HYDROCHLORIDE 10 MG: 10 TABLET ORAL at 20:29

## 2020-10-24 RX ADMIN — HYDROCODONE BITARTRATE AND ACETAMINOPHEN 1 TABLET: 5; 325 TABLET ORAL at 07:51

## 2020-10-24 RX ADMIN — PANTOPRAZOLE SODIUM 40 MG: 40 TABLET, DELAYED RELEASE ORAL at 05:39

## 2020-10-24 RX ADMIN — IBUPROFEN 600 MG: 400 TABLET ORAL at 09:20

## 2020-10-24 RX ADMIN — MORPHINE SULFATE 1 MG: 2 INJECTION, SOLUTION INTRAMUSCULAR; INTRAVENOUS at 23:33

## 2020-10-24 RX ADMIN — MORPHINE SULFATE 1 MG: 2 INJECTION, SOLUTION INTRAMUSCULAR; INTRAVENOUS at 04:24

## 2020-10-24 RX ADMIN — LORAZEPAM 1 MG: 1 TABLET ORAL at 19:23

## 2020-10-24 RX ADMIN — CHLORDIAZEPOXIDE HYDROCHLORIDE 25 MG: 25 CAPSULE ORAL at 23:33

## 2020-10-24 RX ADMIN — MORPHINE SULFATE 1 MG: 2 INJECTION, SOLUTION INTRAMUSCULAR; INTRAVENOUS at 10:22

## 2020-10-24 RX ADMIN — LORAZEPAM 1 MG: 1 TABLET ORAL at 23:33

## 2020-10-24 RX ADMIN — MORPHINE SULFATE 1 MG: 2 INJECTION, SOLUTION INTRAMUSCULAR; INTRAVENOUS at 17:19

## 2020-10-24 RX ADMIN — BUSPIRONE HYDROCHLORIDE 10 MG: 10 TABLET ORAL at 09:20

## 2020-10-24 RX ADMIN — ACETAMINOPHEN 650 MG: 325 TABLET, FILM COATED ORAL at 04:23

## 2020-10-24 RX ADMIN — LORAZEPAM 1 MG: 1 TABLET ORAL at 04:23

## 2020-10-25 PROCEDURE — 25010000002 MORPHINE PER 10 MG: Performed by: INTERNAL MEDICINE

## 2020-10-25 PROCEDURE — 25010000002 ONDANSETRON PER 1 MG: Performed by: PHYSICIAN ASSISTANT

## 2020-10-25 PROCEDURE — 99232 SBSQ HOSP IP/OBS MODERATE 35: CPT | Performed by: PHYSICIAN ASSISTANT

## 2020-10-25 RX ORDER — CLINDAMYCIN HYDROCHLORIDE 150 MG/1
600 CAPSULE ORAL ONCE
Status: COMPLETED | OUTPATIENT
Start: 2020-10-25 | End: 2020-10-25

## 2020-10-25 RX ORDER — L.ACID,PARA/B.BIFIDUM/S.THERM 8B CELL
1 CAPSULE ORAL DAILY
Status: DISCONTINUED | OUTPATIENT
Start: 2020-10-25 | End: 2020-10-26 | Stop reason: HOSPADM

## 2020-10-25 RX ORDER — MORPHINE SULFATE 2 MG/ML
1 INJECTION, SOLUTION INTRAMUSCULAR; INTRAVENOUS EVERY 6 HOURS PRN
Status: DISCONTINUED | OUTPATIENT
Start: 2020-10-25 | End: 2020-10-26 | Stop reason: HOSPADM

## 2020-10-25 RX ORDER — HYDROCODONE BITARTRATE AND ACETAMINOPHEN 5; 325 MG/1; MG/1
1 TABLET ORAL EVERY 6 HOURS PRN
Status: DISCONTINUED | OUTPATIENT
Start: 2020-10-25 | End: 2020-10-26 | Stop reason: HOSPADM

## 2020-10-25 RX ORDER — CLINDAMYCIN HYDROCHLORIDE 150 MG/1
300 CAPSULE ORAL 4 TIMES DAILY
Status: DISCONTINUED | OUTPATIENT
Start: 2020-10-25 | End: 2020-10-26 | Stop reason: HOSPADM

## 2020-10-25 RX ADMIN — CHLORDIAZEPOXIDE HYDROCHLORIDE 25 MG: 25 CAPSULE ORAL at 21:50

## 2020-10-25 RX ADMIN — Medication 1 PATCH: at 07:13

## 2020-10-25 RX ADMIN — ONDANSETRON 4 MG: 2 INJECTION INTRAMUSCULAR; INTRAVENOUS at 14:02

## 2020-10-25 RX ADMIN — MORPHINE SULFATE 1 MG: 2 INJECTION, SOLUTION INTRAMUSCULAR; INTRAVENOUS at 13:44

## 2020-10-25 RX ADMIN — HYDROCODONE BITARTRATE AND ACETAMINOPHEN 1 TABLET: 5; 325 TABLET ORAL at 15:35

## 2020-10-25 RX ADMIN — LORAZEPAM 1 MG: 1 TABLET ORAL at 12:20

## 2020-10-25 RX ADMIN — LORAZEPAM 1 MG: 1 TABLET ORAL at 02:58

## 2020-10-25 RX ADMIN — Medication 1 CAPSULE: at 15:36

## 2020-10-25 RX ADMIN — SODIUM CHLORIDE, PRESERVATIVE FREE 10 ML: 5 INJECTION INTRAVENOUS at 21:51

## 2020-10-25 RX ADMIN — SODIUM CHLORIDE, PRESERVATIVE FREE 10 ML: 5 INJECTION INTRAVENOUS at 08:50

## 2020-10-25 RX ADMIN — HYDROCODONE BITARTRATE AND ACETAMINOPHEN 1 TABLET: 5; 325 TABLET ORAL at 21:50

## 2020-10-25 RX ADMIN — CHLORDIAZEPOXIDE HYDROCHLORIDE 25 MG: 25 CAPSULE ORAL at 05:37

## 2020-10-25 RX ADMIN — LORAZEPAM 1 MG: 1 TABLET ORAL at 19:45

## 2020-10-25 RX ADMIN — PANTOPRAZOLE SODIUM 40 MG: 40 TABLET, DELAYED RELEASE ORAL at 05:37

## 2020-10-25 RX ADMIN — CLINDAMYCIN HYDROCHLORIDE 300 MG: 150 CAPSULE ORAL at 17:10

## 2020-10-25 RX ADMIN — BUSPIRONE HYDROCHLORIDE 10 MG: 10 TABLET ORAL at 08:00

## 2020-10-25 RX ADMIN — LORAZEPAM 1 MG: 1 TABLET ORAL at 14:52

## 2020-10-25 RX ADMIN — MORPHINE SULFATE 1 MG: 2 INJECTION, SOLUTION INTRAMUSCULAR; INTRAVENOUS at 07:09

## 2020-10-25 RX ADMIN — HYDROCODONE BITARTRATE AND ACETAMINOPHEN 1 TABLET: 5; 325 TABLET ORAL at 01:26

## 2020-10-25 RX ADMIN — CHLORDIAZEPOXIDE HYDROCHLORIDE 25 MG: 25 CAPSULE ORAL at 13:45

## 2020-10-25 RX ADMIN — BUSPIRONE HYDROCHLORIDE 10 MG: 10 TABLET ORAL at 21:50

## 2020-10-25 RX ADMIN — CLINDAMYCIN HYDROCHLORIDE 600 MG: 150 CAPSULE ORAL at 13:45

## 2020-10-25 RX ADMIN — LORAZEPAM 1 MG: 1 TABLET ORAL at 07:09

## 2020-10-25 RX ADMIN — MORPHINE SULFATE 1 MG: 2 INJECTION, SOLUTION INTRAMUSCULAR; INTRAVENOUS at 19:45

## 2020-10-25 RX ADMIN — CLINDAMYCIN HYDROCHLORIDE 300 MG: 150 CAPSULE ORAL at 21:50

## 2020-10-25 RX ADMIN — HYDROCODONE BITARTRATE AND ACETAMINOPHEN 1 TABLET: 5; 325 TABLET ORAL at 09:16

## 2020-10-25 RX ADMIN — LORAZEPAM 1 MG: 1 TABLET ORAL at 17:37

## 2020-10-26 VITALS
TEMPERATURE: 98.4 F | WEIGHT: 163 LBS | SYSTOLIC BLOOD PRESSURE: 110 MMHG | RESPIRATION RATE: 18 BRPM | DIASTOLIC BLOOD PRESSURE: 79 MMHG | BODY MASS INDEX: 23.34 KG/M2 | HEART RATE: 82 BPM | HEIGHT: 70 IN | OXYGEN SATURATION: 97 %

## 2020-10-26 PROBLEM — K70.10 ALCOHOLIC HEPATITIS: Status: RESOLVED | Noted: 2020-05-22 | Resolved: 2020-10-26

## 2020-10-26 PROBLEM — K04.7 DENTAL ABSCESS: Status: ACTIVE | Noted: 2020-10-26

## 2020-10-26 PROBLEM — S02.5XXA BROKEN TOOTH: Status: ACTIVE | Noted: 2020-10-26

## 2020-10-26 PROBLEM — K85.20 ALCOHOL-INDUCED ACUTE PANCREATITIS: Status: RESOLVED | Noted: 2020-01-14 | Resolved: 2020-10-26

## 2020-10-26 PROBLEM — D69.6 THROMBOCYTOPENIA (HCC): Status: RESOLVED | Noted: 2020-01-17 | Resolved: 2020-10-26

## 2020-10-26 PROBLEM — F10.239 ALCOHOL DEPENDENCE WITH WITHDRAWAL (HCC): Status: RESOLVED | Noted: 2020-01-15 | Resolved: 2020-10-26

## 2020-10-26 PROBLEM — F10.10 ALCOHOL ABUSE: Status: ACTIVE | Noted: 2020-10-26

## 2020-10-26 PROCEDURE — 99239 HOSP IP/OBS DSCHRG MGMT >30: CPT | Performed by: PHYSICIAN ASSISTANT

## 2020-10-26 PROCEDURE — 25010000002 MORPHINE PER 10 MG: Performed by: INTERNAL MEDICINE

## 2020-10-26 RX ORDER — BUSPIRONE HYDROCHLORIDE 10 MG/1
10 TABLET ORAL 2 TIMES DAILY
Qty: 60 TABLET | Refills: 2 | Status: SHIPPED | OUTPATIENT
Start: 2020-10-26 | End: 2021-03-07

## 2020-10-26 RX ORDER — HYDROCODONE BITARTRATE AND ACETAMINOPHEN 5; 325 MG/1; MG/1
1 TABLET ORAL EVERY 6 HOURS PRN
Qty: 12 TABLET | Refills: 0 | Status: SHIPPED | OUTPATIENT
Start: 2020-10-26 | End: 2021-03-07

## 2020-10-26 RX ORDER — CHLORDIAZEPOXIDE HYDROCHLORIDE 25 MG/1
25 CAPSULE, GELATIN COATED ORAL EVERY 12 HOURS
Status: DISCONTINUED | OUTPATIENT
Start: 2020-10-26 | End: 2020-10-26 | Stop reason: HOSPADM

## 2020-10-26 RX ORDER — CHLORDIAZEPOXIDE HYDROCHLORIDE 25 MG/1
25 CAPSULE, GELATIN COATED ORAL EVERY 12 HOURS
Qty: 3 CAPSULE | Refills: 0 | Status: SHIPPED | OUTPATIENT
Start: 2020-10-26 | End: 2021-03-07

## 2020-10-26 RX ORDER — CLINDAMYCIN HYDROCHLORIDE 300 MG/1
300 CAPSULE ORAL 4 TIMES DAILY
Qty: 30 CAPSULE | Refills: 0
Start: 2020-10-26 | End: 2020-11-03

## 2020-10-26 RX ADMIN — MORPHINE SULFATE 1 MG: 2 INJECTION, SOLUTION INTRAMUSCULAR; INTRAVENOUS at 08:03

## 2020-10-26 RX ADMIN — BUSPIRONE HYDROCHLORIDE 10 MG: 10 TABLET ORAL at 08:03

## 2020-10-26 RX ADMIN — CLINDAMYCIN HYDROCHLORIDE 300 MG: 150 CAPSULE ORAL at 05:13

## 2020-10-26 RX ADMIN — MORPHINE SULFATE 1 MG: 2 INJECTION, SOLUTION INTRAMUSCULAR; INTRAVENOUS at 01:56

## 2020-10-26 RX ADMIN — LORAZEPAM 1 MG: 1 TABLET ORAL at 05:13

## 2020-10-26 RX ADMIN — CLINDAMYCIN HYDROCHLORIDE 300 MG: 150 CAPSULE ORAL at 11:34

## 2020-10-26 RX ADMIN — Medication 1 CAPSULE: at 08:03

## 2020-10-26 RX ADMIN — HYDROCODONE BITARTRATE AND ACETAMINOPHEN 1 TABLET: 5; 325 TABLET ORAL at 11:34

## 2020-10-26 RX ADMIN — SODIUM CHLORIDE, PRESERVATIVE FREE 10 ML: 5 INJECTION INTRAVENOUS at 08:04

## 2020-10-26 RX ADMIN — LORAZEPAM 1 MG: 1 TABLET ORAL at 00:20

## 2020-10-26 RX ADMIN — HYDROCODONE BITARTRATE AND ACETAMINOPHEN 1 TABLET: 5; 325 TABLET ORAL at 05:13

## 2020-10-26 RX ADMIN — Medication 1 PATCH: at 08:03

## 2020-10-26 RX ADMIN — CHLORDIAZEPOXIDE HYDROCHLORIDE 25 MG: 25 CAPSULE ORAL at 05:13

## 2020-10-26 RX ADMIN — LORAZEPAM 1 MG: 1 TABLET ORAL at 08:13

## 2020-10-26 RX ADMIN — PANTOPRAZOLE SODIUM 40 MG: 40 TABLET, DELAYED RELEASE ORAL at 05:13

## 2020-10-26 RX ADMIN — LORAZEPAM 1 MG: 1 TABLET ORAL at 02:36

## 2020-10-27 ENCOUNTER — READMISSION MANAGEMENT (OUTPATIENT)
Dept: CALL CENTER | Facility: HOSPITAL | Age: 30
End: 2020-10-27

## 2020-10-27 NOTE — OUTREACH NOTE
Prep Survey      Responses   [facility] patient discharged from?  [place]   Is LACE score < 7 ?  No   Eligibility  Not Eligible   What are the reasons patient is not eligible?  Other [ETOH]   Does the patient have one of the following disease processes/diagnoses(primary or secondary)?  Other   Prep survey completed?  Yes          Ashley Luke RN

## 2021-03-07 ENCOUNTER — APPOINTMENT (OUTPATIENT)
Dept: CT IMAGING | Facility: HOSPITAL | Age: 31
End: 2021-03-07

## 2021-03-07 ENCOUNTER — HOSPITAL ENCOUNTER (INPATIENT)
Facility: HOSPITAL | Age: 31
LOS: 2 days | Discharge: LEFT BEFORE DISCHARGE | End: 2021-03-09
Attending: EMERGENCY MEDICINE | Admitting: INTERNAL MEDICINE

## 2021-03-07 DIAGNOSIS — R79.89 ELEVATED LACTIC ACID LEVEL: ICD-10-CM

## 2021-03-07 DIAGNOSIS — F19.10 DRUG ABUSE (HCC): ICD-10-CM

## 2021-03-07 DIAGNOSIS — R79.89 ELEVATED LIVER FUNCTION TESTS: ICD-10-CM

## 2021-03-07 DIAGNOSIS — K85.20 ALCOHOL-INDUCED ACUTE PANCREATITIS, UNSPECIFIED COMPLICATION STATUS: Primary | ICD-10-CM

## 2021-03-07 DIAGNOSIS — R11.2 NON-INTRACTABLE VOMITING WITH NAUSEA, UNSPECIFIED VOMITING TYPE: ICD-10-CM

## 2021-03-07 PROBLEM — D75.1 ERYTHROCYTOSIS: Status: ACTIVE | Noted: 2021-03-07

## 2021-03-07 LAB
ALBUMIN SERPL-MCNC: 4.5 G/DL (ref 3.5–5.2)
ALBUMIN/GLOB SERPL: 1.1 G/DL
ALP SERPL-CCNC: 212 U/L (ref 39–117)
ALT SERPL W P-5'-P-CCNC: 105 U/L (ref 1–41)
AMPHET+METHAMPHET UR QL: POSITIVE
AMPHETAMINES UR QL: POSITIVE
ANION GAP SERPL CALCULATED.3IONS-SCNC: 17 MMOL/L (ref 5–15)
AST SERPL-CCNC: 128 U/L (ref 1–40)
BACTERIA UR QL AUTO: NORMAL /HPF
BARBITURATES UR QL SCN: NEGATIVE
BASOPHILS # BLD AUTO: 0.09 10*3/MM3 (ref 0–0.2)
BASOPHILS NFR BLD AUTO: 0.6 % (ref 0–1.5)
BENZODIAZ UR QL SCN: NEGATIVE
BILIRUB SERPL-MCNC: 0.8 MG/DL (ref 0–1.2)
BILIRUB UR QL STRIP: ABNORMAL
BUN SERPL-MCNC: 6 MG/DL (ref 6–20)
BUN/CREAT SERPL: 7.4 (ref 7–25)
BUPRENORPHINE SERPL-MCNC: POSITIVE NG/ML
CALCIUM SPEC-SCNC: 9.8 MG/DL (ref 8.6–10.5)
CANNABINOIDS SERPL QL: NEGATIVE
CHLORIDE SERPL-SCNC: 99 MMOL/L (ref 98–107)
CLARITY UR: CLEAR
CO2 SERPL-SCNC: 23 MMOL/L (ref 22–29)
COCAINE UR QL: NEGATIVE
COLOR UR: ABNORMAL
CREAT SERPL-MCNC: 0.81 MG/DL (ref 0.76–1.27)
D-LACTATE SERPL-SCNC: 0.9 MMOL/L (ref 0.5–2)
D-LACTATE SERPL-SCNC: 2.2 MMOL/L (ref 0.5–2)
DEPRECATED RDW RBC AUTO: 58.3 FL (ref 37–54)
EOSINOPHIL # BLD AUTO: 0.36 10*3/MM3 (ref 0–0.4)
EOSINOPHIL NFR BLD AUTO: 2.4 % (ref 0.3–6.2)
ERYTHROCYTE [DISTWIDTH] IN BLOOD BY AUTOMATED COUNT: 19.9 % (ref 12.3–15.4)
ETHANOL BLD-MCNC: 42 MG/DL (ref 0–10)
GFR SERPL CREATININE-BSD FRML MDRD: 110 ML/MIN/1.73
GLOBULIN UR ELPH-MCNC: 4 GM/DL
GLUCOSE SERPL-MCNC: 106 MG/DL (ref 65–99)
GLUCOSE UR STRIP-MCNC: NEGATIVE MG/DL
HCT VFR BLD AUTO: 57.9 % (ref 37.5–51)
HGB BLD-MCNC: 19 G/DL (ref 13–17.7)
HGB UR QL STRIP.AUTO: NEGATIVE
HOLD SPECIMEN: NORMAL
HYALINE CASTS UR QL AUTO: NORMAL /LPF
IMM GRANULOCYTES # BLD AUTO: 0.04 10*3/MM3 (ref 0–0.05)
IMM GRANULOCYTES NFR BLD AUTO: 0.3 % (ref 0–0.5)
KETONES UR QL STRIP: ABNORMAL
LEUKOCYTE ESTERASE UR QL STRIP.AUTO: ABNORMAL
LIPASE SERPL-CCNC: 193 U/L (ref 13–60)
LYMPHOCYTES # BLD AUTO: 2.35 10*3/MM3 (ref 0.7–3.1)
LYMPHOCYTES NFR BLD AUTO: 15.8 % (ref 19.6–45.3)
MCH RBC QN AUTO: 27.8 PG (ref 26.6–33)
MCHC RBC AUTO-ENTMCNC: 32.8 G/DL (ref 31.5–35.7)
MCV RBC AUTO: 84.6 FL (ref 79–97)
METHADONE UR QL SCN: NEGATIVE
MONOCYTES # BLD AUTO: 1.35 10*3/MM3 (ref 0.1–0.9)
MONOCYTES NFR BLD AUTO: 9.1 % (ref 5–12)
NEUTROPHILS NFR BLD AUTO: 10.7 10*3/MM3 (ref 1.7–7)
NEUTROPHILS NFR BLD AUTO: 71.8 % (ref 42.7–76)
NITRITE UR QL STRIP: POSITIVE
NRBC BLD AUTO-RTO: 0 /100 WBC (ref 0–0.2)
OPIATES UR QL: POSITIVE
OXYCODONE UR QL SCN: NEGATIVE
PCP UR QL SCN: NEGATIVE
PH UR STRIP.AUTO: <=5 [PH] (ref 5–8)
PLATELET # BLD AUTO: 182 10*3/MM3 (ref 140–450)
PMV BLD AUTO: 10.3 FL (ref 6–12)
POTASSIUM SERPL-SCNC: 3.7 MMOL/L (ref 3.5–5.2)
PROPOXYPH UR QL: NEGATIVE
PROT SERPL-MCNC: 8.5 G/DL (ref 6–8.5)
PROT UR QL STRIP: ABNORMAL
RBC # BLD AUTO: 6.84 10*6/MM3 (ref 4.14–5.8)
RBC # UR: NORMAL /HPF
REF LAB TEST METHOD: NORMAL
SODIUM SERPL-SCNC: 139 MMOL/L (ref 136–145)
SP GR UR STRIP: 1.02 (ref 1–1.03)
SQUAMOUS #/AREA URNS HPF: NORMAL /HPF
TRICYCLICS UR QL SCN: POSITIVE
UROBILINOGEN UR QL STRIP: ABNORMAL
WBC # BLD AUTO: 14.89 10*3/MM3 (ref 3.4–10.8)
WBC UR QL AUTO: NORMAL /HPF
WHOLE BLOOD HOLD SPECIMEN: NORMAL
WHOLE BLOOD HOLD SPECIMEN: NORMAL

## 2021-03-07 PROCEDURE — 80053 COMPREHEN METABOLIC PANEL: CPT | Performed by: EMERGENCY MEDICINE

## 2021-03-07 PROCEDURE — 25010000002 ONDANSETRON PER 1 MG: Performed by: PHYSICIAN ASSISTANT

## 2021-03-07 PROCEDURE — 25010000002 THIAMINE PER 100 MG: Performed by: PHYSICIAN ASSISTANT

## 2021-03-07 PROCEDURE — 83690 ASSAY OF LIPASE: CPT | Performed by: EMERGENCY MEDICINE

## 2021-03-07 PROCEDURE — 25010000002 IOPAMIDOL 61 % SOLUTION: Performed by: EMERGENCY MEDICINE

## 2021-03-07 PROCEDURE — 87040 BLOOD CULTURE FOR BACTERIA: CPT | Performed by: EMERGENCY MEDICINE

## 2021-03-07 PROCEDURE — 82077 ASSAY SPEC XCP UR&BREATH IA: CPT | Performed by: PHYSICIAN ASSISTANT

## 2021-03-07 PROCEDURE — 25010000002 MORPHINE PER 10 MG: Performed by: INTERNAL MEDICINE

## 2021-03-07 PROCEDURE — 81001 URINALYSIS AUTO W/SCOPE: CPT | Performed by: EMERGENCY MEDICINE

## 2021-03-07 PROCEDURE — 99223 1ST HOSP IP/OBS HIGH 75: CPT | Performed by: INTERNAL MEDICINE

## 2021-03-07 PROCEDURE — 80306 DRUG TEST PRSMV INSTRMNT: CPT | Performed by: PHYSICIAN ASSISTANT

## 2021-03-07 PROCEDURE — 25010000002 MORPHINE PER 10 MG: Performed by: EMERGENCY MEDICINE

## 2021-03-07 PROCEDURE — 99284 EMERGENCY DEPT VISIT MOD MDM: CPT

## 2021-03-07 PROCEDURE — 74177 CT ABD & PELVIS W/CONTRAST: CPT

## 2021-03-07 PROCEDURE — 87086 URINE CULTURE/COLONY COUNT: CPT | Performed by: NURSE PRACTITIONER

## 2021-03-07 PROCEDURE — 25010000002 LORAZEPAM PER 2 MG: Performed by: INTERNAL MEDICINE

## 2021-03-07 PROCEDURE — 83605 ASSAY OF LACTIC ACID: CPT | Performed by: EMERGENCY MEDICINE

## 2021-03-07 PROCEDURE — 25010000002 LORAZEPAM PER 2 MG: Performed by: EMERGENCY MEDICINE

## 2021-03-07 PROCEDURE — 85025 COMPLETE CBC W/AUTO DIFF WBC: CPT | Performed by: EMERGENCY MEDICINE

## 2021-03-07 PROCEDURE — 25010000002 VANCOMYCIN 10 G RECONSTITUTED SOLUTION: Performed by: PHYSICIAN ASSISTANT

## 2021-03-07 RX ORDER — ACETAMINOPHEN 650 MG/1
650 SUPPOSITORY RECTAL EVERY 4 HOURS PRN
Status: DISCONTINUED | OUTPATIENT
Start: 2021-03-07 | End: 2021-03-09 | Stop reason: HOSPADM

## 2021-03-07 RX ORDER — HYDROCODONE BITARTRATE AND ACETAMINOPHEN 5; 325 MG/1; MG/1
1 TABLET ORAL EVERY 6 HOURS PRN
Status: DISCONTINUED | OUTPATIENT
Start: 2021-03-07 | End: 2021-03-09

## 2021-03-07 RX ORDER — NICOTINE 21 MG/24HR
1 PATCH, TRANSDERMAL 24 HOURS TRANSDERMAL EVERY 24 HOURS
Status: DISCONTINUED | OUTPATIENT
Start: 2021-03-07 | End: 2021-03-09 | Stop reason: HOSPADM

## 2021-03-07 RX ORDER — AMOXICILLIN 250 MG
2 CAPSULE ORAL 2 TIMES DAILY PRN
Status: DISCONTINUED | OUTPATIENT
Start: 2021-03-07 | End: 2021-03-09 | Stop reason: HOSPADM

## 2021-03-07 RX ORDER — ACETAMINOPHEN 325 MG/1
650 TABLET ORAL EVERY 4 HOURS PRN
Status: DISCONTINUED | OUTPATIENT
Start: 2021-03-07 | End: 2021-03-09 | Stop reason: HOSPADM

## 2021-03-07 RX ORDER — LORAZEPAM 1 MG/1
2 TABLET ORAL
Status: DISCONTINUED | OUTPATIENT
Start: 2021-03-07 | End: 2021-03-09 | Stop reason: HOSPADM

## 2021-03-07 RX ORDER — BISACODYL 10 MG
10 SUPPOSITORY, RECTAL RECTAL DAILY PRN
Status: DISCONTINUED | OUTPATIENT
Start: 2021-03-07 | End: 2021-03-09 | Stop reason: HOSPADM

## 2021-03-07 RX ORDER — ONDANSETRON 4 MG/1
4 TABLET, FILM COATED ORAL EVERY 6 HOURS PRN
Status: DISCONTINUED | OUTPATIENT
Start: 2021-03-07 | End: 2021-03-09 | Stop reason: HOSPADM

## 2021-03-07 RX ORDER — ACETAMINOPHEN 160 MG/5ML
650 SOLUTION ORAL EVERY 4 HOURS PRN
Status: DISCONTINUED | OUTPATIENT
Start: 2021-03-07 | End: 2021-03-09 | Stop reason: HOSPADM

## 2021-03-07 RX ORDER — ONDANSETRON 2 MG/ML
4 INJECTION INTRAMUSCULAR; INTRAVENOUS EVERY 6 HOURS PRN
Status: DISCONTINUED | OUTPATIENT
Start: 2021-03-07 | End: 2021-03-09 | Stop reason: HOSPADM

## 2021-03-07 RX ORDER — ONDANSETRON 2 MG/ML
4 INJECTION INTRAMUSCULAR; INTRAVENOUS ONCE
Status: COMPLETED | OUTPATIENT
Start: 2021-03-07 | End: 2021-03-07

## 2021-03-07 RX ORDER — FAMOTIDINE 10 MG/ML
20 INJECTION, SOLUTION INTRAVENOUS ONCE
Status: COMPLETED | OUTPATIENT
Start: 2021-03-07 | End: 2021-03-07

## 2021-03-07 RX ORDER — LORAZEPAM 1 MG/1
1 TABLET ORAL
Status: DISCONTINUED | OUTPATIENT
Start: 2021-03-07 | End: 2021-03-09 | Stop reason: HOSPADM

## 2021-03-07 RX ORDER — SODIUM CHLORIDE 9 MG/ML
10 INJECTION INTRAVENOUS AS NEEDED
Status: DISCONTINUED | OUTPATIENT
Start: 2021-03-07 | End: 2021-03-09 | Stop reason: HOSPADM

## 2021-03-07 RX ORDER — LORAZEPAM 2 MG/ML
2 INJECTION INTRAMUSCULAR
Status: DISCONTINUED | OUTPATIENT
Start: 2021-03-07 | End: 2021-03-09 | Stop reason: HOSPADM

## 2021-03-07 RX ORDER — MORPHINE SULFATE 2 MG/ML
2 INJECTION, SOLUTION INTRAMUSCULAR; INTRAVENOUS EVERY 4 HOURS PRN
Status: DISCONTINUED | OUTPATIENT
Start: 2021-03-07 | End: 2021-03-09 | Stop reason: HOSPADM

## 2021-03-07 RX ORDER — SODIUM CHLORIDE 0.9 % (FLUSH) 0.9 %
10 SYRINGE (ML) INJECTION AS NEEDED
Status: DISCONTINUED | OUTPATIENT
Start: 2021-03-07 | End: 2021-03-09 | Stop reason: HOSPADM

## 2021-03-07 RX ORDER — MORPHINE SULFATE 4 MG/ML
4 INJECTION, SOLUTION INTRAMUSCULAR; INTRAVENOUS ONCE
Status: COMPLETED | OUTPATIENT
Start: 2021-03-07 | End: 2021-03-07

## 2021-03-07 RX ORDER — SODIUM CHLORIDE, SODIUM LACTATE, POTASSIUM CHLORIDE, CALCIUM CHLORIDE 600; 310; 30; 20 MG/100ML; MG/100ML; MG/100ML; MG/100ML
150 INJECTION, SOLUTION INTRAVENOUS CONTINUOUS
Status: DISCONTINUED | OUTPATIENT
Start: 2021-03-07 | End: 2021-03-09 | Stop reason: HOSPADM

## 2021-03-07 RX ORDER — CHLORDIAZEPOXIDE HYDROCHLORIDE 25 MG/1
50 CAPSULE, GELATIN COATED ORAL EVERY 6 HOURS SCHEDULED
Status: DISCONTINUED | OUTPATIENT
Start: 2021-03-07 | End: 2021-03-09 | Stop reason: HOSPADM

## 2021-03-07 RX ORDER — SODIUM CHLORIDE 0.9 % (FLUSH) 0.9 %
10 SYRINGE (ML) INJECTION EVERY 12 HOURS SCHEDULED
Status: DISCONTINUED | OUTPATIENT
Start: 2021-03-07 | End: 2021-03-09 | Stop reason: HOSPADM

## 2021-03-07 RX ORDER — LORAZEPAM 2 MG/ML
1 INJECTION INTRAMUSCULAR ONCE
Status: COMPLETED | OUTPATIENT
Start: 2021-03-07 | End: 2021-03-07

## 2021-03-07 RX ORDER — LORAZEPAM 2 MG/ML
1 INJECTION INTRAMUSCULAR
Status: DISCONTINUED | OUTPATIENT
Start: 2021-03-07 | End: 2021-03-09 | Stop reason: HOSPADM

## 2021-03-07 RX ADMIN — CHLORDIAZEPOXIDE HYDROCHLORIDE 50 MG: 25 CAPSULE ORAL at 18:42

## 2021-03-07 RX ADMIN — VANCOMYCIN HYDROCHLORIDE 1250 MG: 100 INJECTION, POWDER, LYOPHILIZED, FOR SOLUTION INTRAVENOUS at 17:10

## 2021-03-07 RX ADMIN — MORPHINE SULFATE 2 MG: 2 INJECTION, SOLUTION INTRAMUSCULAR; INTRAVENOUS at 20:43

## 2021-03-07 RX ADMIN — ONDANSETRON 4 MG: 2 INJECTION INTRAMUSCULAR; INTRAVENOUS at 14:47

## 2021-03-07 RX ADMIN — LORAZEPAM 1 MG: 2 INJECTION INTRAMUSCULAR; INTRAVENOUS at 15:20

## 2021-03-07 RX ADMIN — SODIUM CHLORIDE, POTASSIUM CHLORIDE, SODIUM LACTATE AND CALCIUM CHLORIDE 200 ML/HR: 600; 310; 30; 20 INJECTION, SOLUTION INTRAVENOUS at 18:35

## 2021-03-07 RX ADMIN — IOPAMIDOL 90 ML: 612 INJECTION, SOLUTION INTRAVENOUS at 15:39

## 2021-03-07 RX ADMIN — AZTREONAM 2 G: 2 INJECTION, POWDER, LYOPHILIZED, FOR SOLUTION INTRAMUSCULAR; INTRAVENOUS at 16:45

## 2021-03-07 RX ADMIN — LORAZEPAM 1 MG: 2 INJECTION INTRAMUSCULAR; INTRAVENOUS at 18:33

## 2021-03-07 RX ADMIN — THIAMINE HYDROCHLORIDE 1000 ML/HR: 100 INJECTION, SOLUTION INTRAMUSCULAR; INTRAVENOUS at 15:20

## 2021-03-07 RX ADMIN — LORAZEPAM 1 MG: 2 INJECTION INTRAMUSCULAR; INTRAVENOUS at 20:43

## 2021-03-07 RX ADMIN — SODIUM CHLORIDE, PRESERVATIVE FREE 10 ML: 5 INJECTION INTRAVENOUS at 20:44

## 2021-03-07 RX ADMIN — SODIUM CHLORIDE, POTASSIUM CHLORIDE, SODIUM LACTATE AND CALCIUM CHLORIDE 200 ML/HR: 600; 310; 30; 20 INJECTION, SOLUTION INTRAVENOUS at 23:43

## 2021-03-07 RX ADMIN — HYDROCODONE BITARTRATE AND ACETAMINOPHEN 1 TABLET: 5; 325 TABLET ORAL at 18:33

## 2021-03-07 RX ADMIN — SODIUM CHLORIDE 1000 ML: 9 INJECTION, SOLUTION INTRAVENOUS at 14:47

## 2021-03-07 RX ADMIN — MORPHINE SULFATE 4 MG: 4 INJECTION, SOLUTION INTRAMUSCULAR; INTRAVENOUS at 16:24

## 2021-03-07 RX ADMIN — FAMOTIDINE 20 MG: 10 INJECTION, SOLUTION INTRAVENOUS at 14:47

## 2021-03-07 RX ADMIN — Medication 1 PATCH: at 18:42

## 2021-03-07 NOTE — H&P
[facility] Medicine Services  HISTORY AND PHYSICAL    Patient Name: Eusebio Holley  : 1990  MRN: [id number]  Primary Care Physician: Alexis Watters MD  Date of admission: 3/7/2021    Subjective   Subjective     Chief Complaint:  Abdominal pain    HPI:  Eusebio Holley is a 30 y.o. male has medical history significant for pancreatitis, hepatitis, EtOH abuse, Suboxone use, opioid abuse, polysubstance abuse, and hypertension who presents to the ED with complaints of abdominal pain that radiates to his back, decreased appetite, nausea, and vomiting for the past 3 days. He describes the pain as constant and sharp. He typically drinks a liter of liquor daily. His last drink was approximately two hours prior to coming to the ED. He denies history of alcohol withdrawal seizures in the past. He does express interest in substance abuse rehabilitation. He denies any chest pain, shortness of breath, cough, fever, or loss of taste/smell. No known high risk COVID-19 exposures.    In the ED, CT of the abdomen/pelvis revealed mild peripancreatic stranding identified suggesting a mild acute pancreatitis.  Labs reviewed and significant for glucose 106, anion gap 17, alk phos 212, , , lactate 2.2, lipase 193, WBC 14.9, RBC 6.84, hemoglobin 19.0, and hematocrit 57.9.  Urinalysis positive for ketones, nitrites, trace leukocytes, trace protein, and 1+ bilirubin. The patient will be admitted by hospital medicine for further evaluation and treatment.     Current COVID Risks are:  [] Fever []  Cough [] Shortness of breath [x] Fatigue [] Change in taste or smell    [] Exposure to COVID positive patient  [] High risk facility   []  NONE    Review of Systems   Constitutional: Positive for chills and fatigue. Negative for activity change, fever and unexpected weight change.   HENT: Negative for congestion, trouble swallowing and voice change.    Eyes: Negative for photophobia,  discharge and visual disturbance.   Respiratory: Negative for chest tightness, shortness of breath and wheezing.    Cardiovascular: Negative for chest pain, palpitations and leg swelling.   Gastrointestinal: Positive for abdominal pain, diarrhea, nausea and vomiting.   Endocrine: Negative for polydipsia, polyphagia and polyuria.   Genitourinary: Positive for dysuria and urgency. Negative for frequency and testicular pain.   Musculoskeletal: Positive for back pain. Negative for myalgias and neck pain.   Skin: Negative for color change, pallor and rash.   Allergic/Immunologic: Negative.    Neurological: Negative for dizziness, seizures, syncope, weakness and headaches.   Hematological: Negative.    Psychiatric/Behavioral: Negative for agitation, confusion and suicidal ideas. The patient is not nervous/anxious.       All other systems reviewed and are negative.     Personal History     Past Medical History:   Diagnosis Date   • Alcohol abuse    • Hypertension    • Pancreatitis        No past surgical history on file.    Family History: family history is not on file. Otherwise pertinent FHx was reviewed and unremarkable.     Social History:  reports that he has been smoking cigarettes. He has been smoking about 1.00 pack per day. He has never used smokeless tobacco. He reports current alcohol use. He reports current drug use. Frequency: 1.00 time per week. Drugs: Benzodiazepines and Marijuana.  Social History     Social History Narrative   • Not on file       Medications:       Allergies   Allergen Reactions   • Penicillins Other (See Comments)     Unknown; patient was a child       Objective   Objective     Vital Signs:   Temp:  [97.8 °F (36.6 °C)] 97.8 °F (36.6 °C)  Heart Rate:  [109-130] 112  Resp:  [18-22] 18  BP: (129-143)/() 129/97    Physical Exam   Constitutional: Awake, alert  Eyes: PERRLA, sclerae anicteric, no conjunctival injection  HENT: NCAT, mucous membranes moist  Neck: Supple, no thyromegaly, no  lymphadenopathy, trachea midline  Respiratory: Clear to auscultation bilaterally, nonlabored respirations   Cardiovascular: RRR, no murmurs, rubs, or gallops, palpable pedal pulses bilaterally  Gastrointestinal: Positive bowel sounds, soft, epigastric tenderness noted  Musculoskeletal: No bilateral ankle edema, no clubbing or cyanosis to extremities  Psychiatric: Appropriate affect, cooperative  Neurologic: Oriented x 3, strength symmetric in all extremities, Cranial Nerves grossly intact to confrontation, speech clear  Skin: warm, dry, no visible rash    Results Reviewed:  I have personally reviewed most recent indicated data and agree with findings including:  [x]  Laboratory  [x]  Radiology  [x]  EKG/Telemetry  []  Pathology  []  Cardiac/Vascular Studies  [x]  Old records  []  Other:  Most pertinent findings include:      LAB RESULTS:      Lab 03/07/21  1658 03/07/21  1348   WBC  --  14.89*   HEMOGLOBIN  --  19.0*   HEMATOCRIT  --  57.9*   PLATELETS  --  182   NEUTROS ABS  --  10.70*   IMMATURE GRANS (ABS)  --  0.04   LYMPHS ABS  --  2.35   MONOS ABS  --  1.35*   EOS ABS  --  0.36   MCV  --  84.6   LACTATE 0.9 2.2*         Lab 03/07/21  1348   SODIUM 139   POTASSIUM 3.7   CHLORIDE 99   CO2 23.0   ANION GAP 17.0*   BUN 6   CREATININE 0.81   GLUCOSE 106*   CALCIUM 9.8         Lab 03/07/21  1348   TOTAL PROTEIN 8.5   ALBUMIN 4.50   GLOBULIN 4.0   ALT (SGPT) 105*   AST (SGOT) 128*   BILIRUBIN 0.8   ALK PHOS 212*   LIPASE 193*                     Brief Urine Lab Results  (Last result in the past 365 days)      Color   Clarity   Blood   Leuk Est   Nitrite   Protein   CREAT   Urine HCG        03/07/21 1358 [place] Clear Negative Trace Positive Trace             Microbiology Results (last 10 days)     ** No results found for the last 240 hours. **          CT Abdomen Pelvis With Contrast    Result Date: 3/7/2021  EXAMINATION: CT ABDOMEN/PELVIS W CONTRAST - 03/07/2021  INDICATION: Abdominal pain.  TECHNIQUE: Multiple axial  CT imaging is obtained of the abdomen and pelvis following the administration of intravenous contrast.  The radiation dose reduction device was turned on for each scan per the ALARA (As Low as Reasonably Achievable) protocol.  COMPARISON: 10/20/2020  FINDINGS:  ABDOMEN: Atelectatic changes in the lung bases bilaterally. The liver is fatty infiltrated. Spleen is unremarkable. There is an incidental pericardial cyst identified anteriorly within the right lower lobe. The cyst measures 4.6 x 2.4 cm. Kidneys and adrenal glands within normal limits. Pancreas reveals some mild peripancreatic stranding suggesting possibly a mild acute pancreatitis. Correlation to clinical lab values is recommended. No abdominal or retroperitoneal lymphadenopathy. The abdominal portion of the gastrointestinal tract within normal limits. No free fluid or free air.  PELVIS: Pelvic organs are unremarkable. The pelvic portion of the gastrointestinal tract within normal limits. The appendix is normal. No free fluid or free air. No abnormal mass or fluid collections identified. The bony structures are unremarkable.  Delayed imaging reveals contrast seen in the renal collecting systems bilaterally as well as within the ureters and bladder.      Impression: Mild peripancreatic stranding identified suggesting a mild acute pancreatitis. Clinical correlation to lab values is recommended.  DICTATED:   03/07/2021 EDITED/ls :   03/07/2021            Assessment/Plan   Assessment & Plan       Acute alcoholic pancreatitis    Suboxone/narcotic abuse, reportedly buys this on the street    Essential hypertension    Lactic acidosis    Alcohol abuse      Eusebio Holley is a 30 y.o. male has medical history significant for pancreatitis, hepatitis, EtOH abuse, Suboxone use, opioid abuse, polysubstance abuse, and hypertension who presents to the ED with complaints of abdominal pain that radiates to his back, decreased appetite, nausea, and vomiting for the past 3  days. He typically drinks a liter of liquor daily. His last drink was approximately two hours prior to coming to the ED.    ETOH Pancreatitis, recurrrent   Lactic Acidosis  - CT of the abdomen/pelvis revealed mild peripancreatic stranding identified suggesting a mild acute pancreatitis  -Lipase 193, Lactate 2.2, Reflex 0.9  -s/p 1L NS bolus in ED  -Aggressive hydration with LR at 200 ml/hr  -Diluadid/Norco PRN  -NPO for now  - informed patient of risk of permanent damage to pancreas and urged him to quit drinking.      ETOH Abuse; history of withdrawal symptoms   Wishes to quit  - reportedly drink 1 Liter of liquor daily  -Last drink today about 11am  -ETOH 42 on admission  -Banana bag (MVI, folic acid, thiamin) x 3 days, then PO   -Scheduled Librium 50 mg every 6 hours  -CIWA protocol with PRN Ativan  -Addiction team consult     Etohic hepatitis  - Supportive care; follow.    - no indication for steroids.     Erythrocytosis   -suspect secondary to hemoconcentration, volume depletion  -IV fluids as above  -CBC in AM     Possible UTI  -Reports mild dysuria, denies recent unprotected sex  -UA positive for nitrites, 1+ leuks - however no WBCs or bacteria   -Urine culture pending  -Azactam 2 g given in ED; will continue for now but anticipate DC soon      Polysubstance abuse  -UDS + for amphetamine, buprenorphine, methamphetamine, opiate, and TCA  -Addiction team consult    Tobacco abuse  -Nicotine patch    Migraine, acute  - at home he uses imitrex   - supportive care for now     DVT prophylaxis:  SCDs for now       CODE STATUS:  Discussed with patient at bedside.   Code Status and Medical Interventions:   Ordered at: 03/07/21 1705     Level Of Support Discussed With:    Patient     Code Status:    CPR     Medical Interventions (Level of Support Prior to Arrest):    Full         This note has been completed as part of a split-shared workflow.     Electronically signed by URIAH Bolaños, 03/07/21, 5:10 PM  EST.      Attending   Admission Attestation       I have seen and examined the patient, performing an independent face-to-face diagnostic evaluation with plan of care reviewed and developed with the advanced practice clinician (APC).      Brief Summary Statement:   Eusebio Holley is a 30 y.o. male with history of daily heavy alcohol intake, longstanding.  He developed upper abdominal pain three days ago.  He has not had much nausea.  He has not had fevers.  He has not been eating much at home; eating worsens the pain.  The pain is similar to past bout of pancreatitis. He got morphine 4mg IV in ED and is feeling better.     Remainder of detailed HPI is as noted by APC and has been reviewed and/or edited by me for completeness.    Attending Physical Exam:  Constitutional: Awake, alert in ED bed, NAD.  Conversant and appropriate   Eyes: PER, sclerae anicteric, no conjunctival injection  HENT: NCAT, mucous membranes dry  Neck: Supple,  no lymphadenopathy, trachea midline  Respiratory: Clear to auscultation bilaterally, nonlabored respirations   Cardiovascular: tachy RR, no murmurs, rubs, or gallops, palpable pedal pulses bilaterally  Gastrointestinal: Positive bowel sounds, soft, min tender epigastrium without rebound nondistended  Musculoskeletal: No bilateral ankle edema, no clubbing or cyanosis to extremities  Psychiatric: Appropriate affect, cooperative  Neurologic: Oriented x 3, strength symmetric in all extremities, Cranial Nerves grossly intact to confrontation, speech clear, no tremors, no hallucinations, no agitation   Skin: No rashes      Brief Assessment/Plan :  See detailed assessment and plan developed with APC which I have reviewed and/or edited for completeness.        Admission Status: I believe that this patient meets INPATIENT status due to acute pancreatitis and impending etoh withdrawal with risk to life.  I feel patient’s risk for adverse outcomes and need for care warrant INPATIENT evaluation and  I predict the patient’s care encounter to likely last beyond 2 midnights.        Lesly Manzanares MD  03/07/21

## 2021-03-07 NOTE — ED PROVIDER NOTES
"Subjective   Pt is a 31 yo male presenting to ED with complaints of abdominal pain. PMHx significant for ETOH and drug abuse, HTN and Pancreatitis. Pt complains of left upper abdominal pain for 3 days with decreased food intake. He has had nausea, vomiting and diarrhea but denies any blood in vomit/stool. He drinks about a 5th to a liter of liqour per day. Last drank 2 hours PTA. He denies hx of withdrawal seizures or DTs. He was last admitted 10/2020 for pancreatitis. He expresses a desire to stop drinking ETOH. He admits to taking suboxone off the street \"to keep me from withdrawal\". He denies fever, chills, CP, SOB, cough, urinary sx or loss of taste/smell. He denies known Covid exposure.       History provided by:  Medical records and patient      Review of Systems   Constitutional: Negative for chills and fever.   HENT: Negative for congestion and trouble swallowing.    Eyes: Negative for visual disturbance.   Respiratory: Negative for cough and shortness of breath.    Cardiovascular: Negative for chest pain and leg swelling.   Gastrointestinal: Positive for abdominal pain, diarrhea, nausea and vomiting. Negative for blood in stool.   Genitourinary: Negative for difficulty urinating, dysuria and flank pain.   Musculoskeletal: Negative for arthralgias and back pain.   Skin: Negative.  Negative for rash and wound.   Allergic/Immunologic: Negative.    Neurological: Negative for dizziness, syncope, weakness, numbness and headaches.   Psychiatric/Behavioral: Negative for confusion.   All other systems reviewed and are negative.      Past Medical History:   Diagnosis Date   • Alcohol abuse    • Hypertension    • Pancreatitis        Allergies   Allergen Reactions   • Penicillins Other (See Comments)     Unknown; patient was a child       No past surgical history on file.    No family history on file.    Social History     Socioeconomic History   • Marital status:      Spouse name: Not on file   • Number of " children: Not on file   • Years of education: Not on file   • Highest education level: Not on file   Tobacco Use   • Smoking status: Current Every Day Smoker     Packs/day: 1.00     Types: Cigarettes   • Smokeless tobacco: Never Used   Vaping Use   • Vaping Use: Never used   Substance and Sexual Activity   • Alcohol use: Yes     Comment: ROUGHLY A FIFTH OF VODKA DAILY    • Drug use: Yes     Frequency: 1.0 times per week     Types: Benzodiazepines, Marijuana     Comment: every once in a while   • Sexual activity: Defer           Objective   Physical Exam  Vitals and nursing note reviewed.   Constitutional:       Appearance: He is well-developed.   HENT:      Head: Atraumatic.      Nose: Nose normal.   Eyes:      General: Lids are normal.      Conjunctiva/sclera: Conjunctivae normal.      Pupils: Pupils are equal, round, and reactive to light.   Cardiovascular:      Rate and Rhythm: Regular rhythm. Tachycardia present.      Heart sounds: Normal heart sounds.   Pulmonary:      Effort: Pulmonary effort is normal.      Breath sounds: Normal breath sounds.   Abdominal:      General: There is no distension.      Palpations: Abdomen is soft.      Tenderness: There is abdominal tenderness in the epigastric area, periumbilical area, left upper quadrant and left lower quadrant. There is no guarding or rebound.   Musculoskeletal:         General: No tenderness or deformity. Normal range of motion.      Cervical back: Normal range of motion and neck supple.   Skin:     General: Skin is warm and dry.   Neurological:      Mental Status: He is alert and oriented to person, place, and time.      Sensory: No sensory deficit.   Psychiatric:         Behavior: Behavior normal.         Procedures           ED Course  ED Course as of Mar 07 1623   Sun Mar 07, 2021   1431 WBC(!): 14.89 [RT]   1446 ALT (SGPT)(!): 105 [RT]   1446 AST (SGOT)(!): 128 [RT]   1446 Alkaline Phosphatase(!): 212 [RT]   1446 Lipase(!): 193 [RT]   1446 Ethanol(!): 42  [RT]   1446 Lactate(!!): 2.2 [RT]   1446 Methamphetamine, Ur(!): Positive [RT]   1446 Opiate Screen(!): Positive [RT]   1446 Amphetamine, Urine Qual(!): Positive [RT]   1446 Tricyclic Antidepressants Screen(!): Positive [RT]   1446 Buprenorphine, Screen, Urine(!): Positive [RT]   1605 Discussed patient with Dr. Lechuga who is agreeable with ED course and plan for admission.   Will give initial dose of sepsis antibiotics due to flagging for sepsis with vitals/labs.     [RT]   1615 Discussed patient with hospitalist Dr. Manzanares.     [RT]   1623 CIWA 4    [RT]      ED Course User Index  [RT] Angélica Whyte PA      Re-examined patient several times in ED. Pt resting and feeling better after meds/fluids. Discussed results and tx plan for admission. Pt's UDS collected prior to any meds in ED.     Reviewed old records.     Recent Results (from the past 24 hour(s))   Comprehensive Metabolic Panel    Collection Time: 03/07/21  1:48 PM    Specimen: Blood   Result Value Ref Range    Glucose 106 (H) 65 - 99 mg/dL    BUN 6 6 - 20 mg/dL    Creatinine 0.81 0.76 - 1.27 mg/dL    Sodium 139 136 - 145 mmol/L    Potassium 3.7 3.5 - 5.2 mmol/L    Chloride 99 98 - 107 mmol/L    CO2 23.0 22.0 - 29.0 mmol/L    Calcium 9.8 8.6 - 10.5 mg/dL    Total Protein 8.5 6.0 - 8.5 g/dL    Albumin 4.50 3.50 - 5.20 g/dL    ALT (SGPT) 105 (H) 1 - 41 U/L    AST (SGOT) 128 (H) 1 - 40 U/L    Alkaline Phosphatase 212 (H) 39 - 117 U/L    Total Bilirubin 0.8 0.0 - 1.2 mg/dL    eGFR Non African Amer 110 >60 mL/min/1.73    Globulin 4.0 gm/dL    A/G Ratio 1.1 g/dL    BUN/Creatinine Ratio 7.4 7.0 - 25.0    Anion Gap 17.0 (H) 5.0 - 15.0 mmol/L   Lipase    Collection Time: 03/07/21  1:48 PM    Specimen: Blood   Result Value Ref Range    Lipase 193 (H) 13 - 60 U/L   Light Blue Top    Collection Time: 03/07/21  1:48 PM   Result Value Ref Range    Extra Tube hold for add-on    Green Top (Gel)    Collection Time: 03/07/21  1:48 PM   Result Value Ref Range    Extra Tube  Hold for add-ons.    Lavender Top    Collection Time: 03/07/21  1:48 PM   Result Value Ref Range    Extra Tube hold for add-on    Gold Top - SST    Collection Time: 03/07/21  1:48 PM   Result Value Ref Range    Extra Tube Hold for add-ons.    Gray Top - Ice    Collection Time: 03/07/21  1:48 PM   Result Value Ref Range    Extra Tube Hold for add-ons.    CBC Auto Differential    Collection Time: 03/07/21  1:48 PM    Specimen: Blood   Result Value Ref Range    WBC 14.89 (H) 3.40 - 10.80 10*3/mm3    RBC 6.84 (H) 4.14 - 5.80 10*6/mm3    Hemoglobin 19.0 (H) 13.0 - 17.7 g/dL    Hematocrit 57.9 (H) 37.5 - 51.0 %    MCV 84.6 79.0 - 97.0 fL    MCH 27.8 26.6 - 33.0 pg    MCHC 32.8 31.5 - 35.7 g/dL    RDW 19.9 (H) 12.3 - 15.4 %    RDW-SD 58.3 (H) 37.0 - 54.0 fl    MPV 10.3 6.0 - 12.0 fL    Platelets 182 140 - 450 10*3/mm3    Neutrophil % 71.8 42.7 - 76.0 %    Lymphocyte % 15.8 (L) 19.6 - 45.3 %    Monocyte % 9.1 5.0 - 12.0 %    Eosinophil % 2.4 0.3 - 6.2 %    Basophil % 0.6 0.0 - 1.5 %    Immature Grans % 0.3 0.0 - 0.5 %    Neutrophils, Absolute 10.70 (H) 1.70 - 7.00 10*3/mm3    Lymphocytes, Absolute 2.35 0.70 - 3.10 10*3/mm3    Monocytes, Absolute 1.35 (H) 0.10 - 0.90 10*3/mm3    Eosinophils, Absolute 0.36 0.00 - 0.40 10*3/mm3    Basophils, Absolute 0.09 0.00 - 0.20 10*3/mm3    Immature Grans, Absolute 0.04 0.00 - 0.05 10*3/mm3    nRBC 0.0 0.0 - 0.2 /100 WBC   Lactic Acid, Plasma    Collection Time: 03/07/21  1:48 PM    Specimen: Blood   Result Value Ref Range    Lactate 2.2 (C) 0.5 - 2.0 mmol/L   Ethanol    Collection Time: 03/07/21  1:48 PM    Specimen: Blood   Result Value Ref Range    Ethanol 42 (H) 0 - 10 mg/dL   Urinalysis With Microscopic If Indicated (No Culture) - Urine, Clean Catch    Collection Time: 03/07/21  1:58 PM    Specimen: Urine, Clean Catch   Result Value Ref Range    Color, UA Orange (A) Yellow, Straw    Appearance, UA Clear Clear    pH, UA <=5.0 5.0 - 8.0    Specific Gravity, UA 1.020 1.001 - 1.030     Glucose, UA Negative Negative    Ketones, UA 15 mg/dL (1+) (A) Negative    Bilirubin, UA Small (1+) (A) Negative    Blood, UA Negative Negative    Protein, UA Trace (A) Negative    Leuk Esterase, UA Trace (A) Negative    Nitrite, UA Positive (A) Negative    Urobilinogen, UA 1.0 E.U./dL 0.2 - 1.0 E.U./dL   Urine Drug Screen - Urine, Clean Catch    Collection Time: 03/07/21  1:58 PM    Specimen: Urine, Clean Catch   Result Value Ref Range    THC, Screen, Urine Negative Negative    Phencyclidine (PCP), Urine Negative Negative    Cocaine Screen, Urine Negative Negative    Methamphetamine, Ur Positive (A) Negative    Opiate Screen Positive (A) Negative    Amphetamine Screen, Urine Positive (A) Negative    Benzodiazepine Screen, Urine Negative Negative    Tricyclic Antidepressants Screen Positive (A) Negative    Methadone Screen, Urine Negative Negative    Barbiturates Screen, Urine Negative Negative    Oxycodone Screen, Urine Negative Negative    Propoxyphene Screen Negative Negative    Buprenorphine, Screen, Urine Positive (A) Negative   Urinalysis, Microscopic Only - Urine, Clean Catch    Collection Time: 03/07/21  1:58 PM    Specimen: Urine, Clean Catch   Result Value Ref Range    RBC, UA 0-2 None Seen, 0-2 /HPF    WBC, UA 0-2 None Seen, 0-2 /HPF    Bacteria, UA None Seen None Seen, Trace /HPF    Squamous Epithelial Cells, UA None Seen None Seen, 0-2 /HPF    Hyaline Casts, UA 0-6 0 - 6 /LPF    Methodology Automated Microscopy      Note: In addition to lab results from this visit, the labs listed above may include labs taken at another facility or during a different encounter within the last 24 hours. Please correlate lab times with ED admission and discharge times for further clarification of the services performed during this visit.    CT Abdomen Pelvis With Contrast   Preliminary Result   Mild peripancreatic stranding identified suggesting a mild   acute pancreatitis. Clinical correlation to lab values is  "recommended.       DICTATED:   03/07/2021   EDITED/ls :   03/07/2021            Vitals:    03/07/21 1336 03/07/21 1400 03/07/21 1500   BP: (!) 131/103 (!) 138/104 133/95   BP Location: Left arm Left arm    Patient Position: Sitting Sitting    Pulse: (!) 130 109 116   Resp: 22 20 20   Temp: 97.8 °F (36.6 °C)     TempSrc: Oral     SpO2: 100% 93% 93%   Weight: 68 kg (150 lb)     Height: 175.3 cm (69\")       Medications   Sodium Chloride (PF) 0.9 % 10 mL (has no administration in time range)   aztreonam (AZACTAM) 2 g/100 mL 0.9% NS (mbp) (has no administration in time range)   vancomycin 1250 mg/250 mL 0.9% NS IVPB ([facility]) (has no administration in time range)   Morphine sulfate (PF) injection 4 mg (has no administration in time range)   sodium chloride 0.9 % bolus 1,000 mL (0 mL Intravenous Stopped 3/7/21 1533)   famotidine (PEPCID) injection 20 mg (20 mg Intravenous Given 3/7/21 1447)   ondansetron (ZOFRAN) injection 4 mg (4 mg Intravenous Given 3/7/21 1447)   thiamine (B-1) 100 mg, folic acid 1 mg in sodium chloride 0.9 % 1,000 mL infusion (1,000 mL/hr Intravenous New Bag 3/7/21 1520)   LORazepam (ATIVAN) injection 1 mg (1 mg Intravenous Given 3/7/21 1520)   iopamidol (ISOVUE-300) 61 % injection 100 mL (90 mL Intravenous Given 3/7/21 1539)     ECG/EMG Results (last 24 hours)     ** No results found for the last 24 hours. **        No orders to display         COVID-19 RISK SCREEN     1. Has the patient had close contact without PPE with a lab confirmed COVID-19 (+) person or a person under investigation (PUI) for COVID-19 infection?  -- No     2. Has the patient had respiratory symptoms, worsened/new cough and/or SOA, unexplained fever, or sudden loss of smell and/or taste in the past 7 days? --  No    3. Does the patient have baseline higher exposure risk such as working in healthcare field, currently residing in healthcare facility, or ongoing hemodialysis?  --  No                                      "     MDM    Final diagnoses:   Alcohol-induced acute pancreatitis, unspecified complication status   Elevated liver function tests   Elevated lactic acid level   Non-intractable vomiting with nausea, unspecified vomiting type   Drug abuse (CMS/HCC)            Angélica Whyte PA  03/07/21 1614       Angélica Whyte PA  03/07/21 [id number]

## 2021-03-07 NOTE — PAYOR COMM NOTE
"Eusebio Holley (30 y.o. Male) INITIAL NOTIFICATION    Date of Birth Social Security Number Address Home Phone MRN    1990  [id number] [address] [phone] [id number]    Mandaen Marital Status          [employer]        Admission Date Admission Type Admitting Provider Attending Provider Department, Room/Bed    3/7/21 Emergency Lesly Manzanares MD Mini, Jocelyn, MD [facility] 5G, S556/1    Discharge Date Discharge Disposition Discharge Destination                       Attending Provider: Lesly Manzanares MD    Allergies: Penicillins    Isolation: None   Infection: COVID Screen (preop/placement) (21)   Code Status: CPR    Ht: 175.3 cm (69\")   Wt: 75.4 kg (166 lb 4.8 oz)    Admission Cmt: None   Principal Problem: Acute alcoholic pancreatitis [K85.20]                 Active Insurance as of 3/7/2021     Primary Coverage     Payor Plan Insurance Group Employer/Plan Group    ANTHEM MEDICAID ANTHEM MEDICAID [id number]     Payor Plan Address Payor Plan Phone Number Payor Plan Fax Number Effective Dates    [address] [address] [phone]  2019 - None Entered    [facility] [id number]       Subscriber Name Subscriber Birth Date Member ID       EUSEBIO HOLLEY ALESHIA 1990 [id number]                 Emergency Contacts      (Rel.) Home Phone Work Phone Mobile Phone    JENNIFER HOLLEY (Father) [phone] -- [phone]            Insurance Information                ANTHEM MEDICAID/ANTHEM MEDICAID Phone: [phone]    Subscriber: Eusebio Holley Subscriber#: [id number]    Group#: [id number] Precert#:              History & Physical      Lesly Manzanares MD at 21 1710              [facility] Medicine Services  HISTORY AND PHYSICAL    Patient Name: Eusebio Holley  : 1990  MRN: [id number]  Primary Care Physician: Alexis Watters MD  Date of admission: 3/7/2021    Subjective   Subjective     Chief " Complaint:  Abdominal pain    HPI:  Eusebio Holley is a 30 y.o. male has medical history significant for pancreatitis, hepatitis, EtOH abuse, Suboxone use, opioid abuse, polysubstance abuse, and hypertension who presents to the ED with complaints of abdominal pain that radiates to his back, decreased appetite, nausea, and vomiting for the past 3 days. He describes the pain as constant and sharp. He typically drinks a liter of liquor daily. His last drink was approximately two hours prior to coming to the ED. He denies history of alcohol withdrawal seizures in the past. He does express interest in substance abuse rehabilitation. He denies any chest pain, shortness of breath, cough, fever, or loss of taste/smell. No known high risk COVID-19 exposures.    In the ED, CT of the abdomen/pelvis revealed mild peripancreatic stranding identified suggesting a mild acute pancreatitis.  Labs reviewed and significant for glucose 106, anion gap 17, alk phos 212, , , lactate 2.2, lipase 193, WBC 14.9, RBC 6.84, hemoglobin 19.0, and hematocrit 57.9.  Urinalysis positive for ketones, nitrites, trace leukocytes, trace protein, and 1+ bilirubin. The patient will be admitted by hospital medicine for further evaluation and treatment.     Current COVID Risks are:  [] Fever []  Cough [] Shortness of breath [x] Fatigue [] Change in taste or smell    [] Exposure to COVID positive patient  [] High risk facility   []  NONE    Review of Systems   Constitutional: Positive for chills and fatigue. Negative for activity change, fever and unexpected weight change.   HENT: Negative for congestion, trouble swallowing and voice change.    Eyes: Negative for photophobia, discharge and visual disturbance.   Respiratory: Negative for chest tightness, shortness of breath and wheezing.    Cardiovascular: Negative for chest pain, palpitations and leg swelling.   Gastrointestinal: Positive for abdominal pain, diarrhea, nausea and vomiting.    Endocrine: Negative for polydipsia, polyphagia and polyuria.   Genitourinary: Positive for dysuria and urgency. Negative for frequency and testicular pain.   Musculoskeletal: Positive for back pain. Negative for myalgias and neck pain.   Skin: Negative for color change, pallor and rash.   Allergic/Immunologic: Negative.    Neurological: Negative for dizziness, seizures, syncope, weakness and headaches.   Hematological: Negative.    Psychiatric/Behavioral: Negative for agitation, confusion and suicidal ideas. The patient is not nervous/anxious.       All other systems reviewed and are negative.     Personal History     Past Medical History:   Diagnosis Date   • Alcohol abuse    • Hypertension    • Pancreatitis        No past surgical history on file.    Family History: family history is not on file. Otherwise pertinent FHx was reviewed and unremarkable.     Social History:  reports that he has been smoking cigarettes. He has been smoking about 1.00 pack per day. He has never used smokeless tobacco. He reports current alcohol use. He reports current drug use. Frequency: 1.00 time per week. Drugs: Benzodiazepines and Marijuana.  Social History     Social History Narrative   • Not on file       Medications:       Allergies   Allergen Reactions   • Penicillins Other (See Comments)     Unknown; patient was a child       Objective   Objective     Vital Signs:   Temp:  [97.8 °F (36.6 °C)] 97.8 °F (36.6 °C)  Heart Rate:  [109-130] 112  Resp:  [18-22] 18  BP: (129-143)/() 129/97    Physical Exam   Constitutional: Awake, alert  Eyes: PERRLA, sclerae anicteric, no conjunctival injection  HENT: NCAT, mucous membranes moist  Neck: Supple, no thyromegaly, no lymphadenopathy, trachea midline  Respiratory: Clear to auscultation bilaterally, nonlabored respirations   Cardiovascular: RRR, no murmurs, rubs, or gallops, palpable pedal pulses bilaterally  Gastrointestinal: Positive bowel sounds, soft, epigastric tenderness  noted  Musculoskeletal: No bilateral ankle edema, no clubbing or cyanosis to extremities  Psychiatric: Appropriate affect, cooperative  Neurologic: Oriented x 3, strength symmetric in all extremities, Cranial Nerves grossly intact to confrontation, speech clear  Skin: warm, dry, no visible rash    Results Reviewed:  I have personally reviewed most recent indicated data and agree with findings including:  [x]  Laboratory  [x]  Radiology  [x]  EKG/Telemetry  []  Pathology  []  Cardiac/Vascular Studies  [x]  Old records  []  Other:  Most pertinent findings include:      LAB RESULTS:      Lab 03/07/21  1658 03/07/21  1348   WBC  --  14.89*   HEMOGLOBIN  --  19.0*   HEMATOCRIT  --  57.9*   PLATELETS  --  182   NEUTROS ABS  --  10.70*   IMMATURE GRANS (ABS)  --  0.04   LYMPHS ABS  --  2.35   MONOS ABS  --  1.35*   EOS ABS  --  0.36   MCV  --  84.6   LACTATE 0.9 2.2*         Lab 03/07/21  1348   SODIUM 139   POTASSIUM 3.7   CHLORIDE 99   CO2 23.0   ANION GAP 17.0*   BUN 6   CREATININE 0.81   GLUCOSE 106*   CALCIUM 9.8         Lab 03/07/21  1348   TOTAL PROTEIN 8.5   ALBUMIN 4.50   GLOBULIN 4.0   ALT (SGPT) 105*   AST (SGOT) 128*   BILIRUBIN 0.8   ALK PHOS 212*   LIPASE 193*                     Brief Urine Lab Results  (Last result in the past 365 days)      Color   Clarity   Blood   Leuk Est   Nitrite   Protein   CREAT   Urine HCG        03/07/21 1358 Jackson Clear Negative Trace Positive Trace             Microbiology Results (last 10 days)     ** No results found for the last 240 hours. **          CT Abdomen Pelvis With Contrast    Result Date: 3/7/2021  EXAMINATION: CT ABDOMEN/PELVIS W CONTRAST - 03/07/2021  INDICATION: Abdominal pain.  TECHNIQUE: Multiple axial CT imaging is obtained of the abdomen and pelvis following the administration of intravenous contrast.  The radiation dose reduction device was turned on for each scan per the ALARA (As Low as Reasonably Achievable) protocol.  COMPARISON: 10/20/2020  FINDINGS:   ABDOMEN: Atelectatic changes in the lung bases bilaterally. The liver is fatty infiltrated. Spleen is unremarkable. There is an incidental pericardial cyst identified anteriorly within the right lower lobe. The cyst measures 4.6 x 2.4 cm. Kidneys and adrenal glands within normal limits. Pancreas reveals some mild peripancreatic stranding suggesting possibly a mild acute pancreatitis. Correlation to clinical lab values is recommended. No abdominal or retroperitoneal lymphadenopathy. The abdominal portion of the gastrointestinal tract within normal limits. No free fluid or free air.  PELVIS: Pelvic organs are unremarkable. The pelvic portion of the gastrointestinal tract within normal limits. The appendix is normal. No free fluid or free air. No abnormal mass or fluid collections identified. The bony structures are unremarkable.  Delayed imaging reveals contrast seen in the renal collecting systems bilaterally as well as within the ureters and bladder.      Impression: Mild peripancreatic stranding identified suggesting a mild acute pancreatitis. Clinical correlation to lab values is recommended.  DICTATED:   03/07/2021 EDITED/ls :   03/07/2021            Assessment/Plan   Assessment & Plan       Acute alcoholic pancreatitis    Suboxone/narcotic abuse, reportedly buys this on the street    Essential hypertension    Lactic acidosis    Alcohol abuse      Eusebio Holley is a 30 y.o. male has medical history significant for pancreatitis, hepatitis, EtOH abuse, Suboxone use, opioid abuse, polysubstance abuse, and hypertension who presents to the ED with complaints of abdominal pain that radiates to his back, decreased appetite, nausea, and vomiting for the past 3 days. He typically drinks a liter of liquor daily. His last drink was approximately two hours prior to coming to the ED.    ETOH Pancreatitis, recurrrent   Lactic Acidosis  - CT of the abdomen/pelvis revealed mild peripancreatic stranding identified suggesting a  mild acute pancreatitis  -Lipase 193, Lactate 2.2, Reflex 0.9  -s/p 1L NS bolus in ED  -Aggressive hydration with LR at 200 ml/hr  -Diluadid/Norco PRN  -NPO for now  - informed patient of risk of permanent damage to pancreas and urged him to quit drinking.      ETOH Abuse; history of withdrawal symptoms   Wishes to quit  - reportedly drink 1 Liter of liquor daily  -Last drink today about 11am  -ETOH 42 on admission  -Banana bag (MVI, folic acid, thiamin) x 3 days, then PO   -Scheduled Librium 50 mg every 6 hours  -CIWA protocol with PRN Ativan  -Addiction team consult     Etohic hepatitis  - Supportive care; follow.    - no indication for steroids.     Erythrocytosis   -suspect secondary to hemoconcentration, volume depletion  -IV fluids as above  -CBC in AM     Possible UTI  -Reports mild dysuria, denies recent unprotected sex  -UA positive for nitrites, 1+ leuks - however no WBCs or bacteria   -Urine culture pending  -Azactam 2 g given in ED; will continue for now but anticipate DC soon      Polysubstance abuse  -UDS + for amphetamine, buprenorphine, methamphetamine, opiate, and TCA  -Addiction team consult    Tobacco abuse  -Nicotine patch    Migraine, acute  - at home he uses imitrex   - supportive care for now     DVT prophylaxis:  SCDs for now       CODE STATUS:  Discussed with patient at bedside.   Code Status and Medical Interventions:   Ordered at: 03/07/21 1705     Level Of Support Discussed With:    Patient     Code Status:    CPR     Medical Interventions (Level of Support Prior to Arrest):    Full         This note has been completed as part of a split-shared workflow.     Electronically signed by URIAH Bolaños, 03/07/21, 5:10 PM EST.      Attending   Admission Attestation       I have seen and examined the patient, performing an independent face-to-face diagnostic evaluation with plan of care reviewed and developed with the advanced practice clinician (APC).      Brief Summary Statement:   Eusebio  ALESHIA Holley is a 30 y.o. male with history of daily heavy alcohol intake, longstanding.  He developed upper abdominal pain three days ago.  He has not had much nausea.  He has not had fevers.  He has not been eating much at home; eating worsens the pain.  The pain is similar to past bout of pancreatitis. He got morphine 4mg IV in ED and is feeling better.     Remainder of detailed HPI is as noted by APC and has been reviewed and/or edited by me for completeness.    Attending Physical Exam:  Constitutional: Awake, alert in ED bed, NAD.  Conversant and appropriate   Eyes: PER, sclerae anicteric, no conjunctival injection  HENT: NCAT, mucous membranes dry  Neck: Supple,  no lymphadenopathy, trachea midline  Respiratory: Clear to auscultation bilaterally, nonlabored respirations   Cardiovascular: tachy RR, no murmurs, rubs, or gallops, palpable pedal pulses bilaterally  Gastrointestinal: Positive bowel sounds, soft, min tender epigastrium without rebound nondistended  Musculoskeletal: No bilateral ankle edema, no clubbing or cyanosis to extremities  Psychiatric: Appropriate affect, cooperative  Neurologic: Oriented x 3, strength symmetric in all extremities, Cranial Nerves grossly intact to confrontation, speech clear, no tremors, no hallucinations, no agitation   Skin: No rashes      Brief Assessment/Plan :  See detailed assessment and plan developed with APC which I have reviewed and/or edited for completeness.        Admission Status: I believe that this patient meets INPATIENT status due to acute pancreatitis and impending etoh withdrawal with risk to life.  I feel patient’s risk for adverse outcomes and need for care warrant INPATIENT evaluation and I predict the patient’s care encounter to likely last beyond 2 midnights.        Lesly Manzanares MD  03/07/21                          Electronically signed by Lesly Manzanares MD at 03/07/21 8148          Emergency Department Notes      Angélica Whyte PA at 03/07/21 9467   "   Attestation signed by Angel Lechuga MD at 03/07/21 1645          For this patient encounter, I reviewed the NP or PA documentation, treatment plan, and medical decision making. Angel Lechuga MD 3/7/2021 16:45 EST                  Subjective   Pt is a 31 yo male presenting to ED with complaints of abdominal pain. PMHx significant for ETOH and drug abuse, HTN and Pancreatitis. Pt complains of left upper abdominal pain for 3 days with decreased food intake. He has had nausea, vomiting and diarrhea but denies any blood in vomit/stool. He drinks about a 5th to a liter of liqour per day. Last drank 2 hours PTA. He denies hx of withdrawal seizures or DTs. He was last admitted 10/2020 for pancreatitis. He expresses a desire to stop drinking ETOH. He admits to taking suboxone off the street \"to keep me from withdrawal\". He denies fever, chills, CP, SOB, cough, urinary sx or loss of taste/smell. He denies known Covid exposure.       History provided by:  Medical records and patient      Review of Systems   Constitutional: Negative for chills and fever.   HENT: Negative for congestion and trouble swallowing.    Eyes: Negative for visual disturbance.   Respiratory: Negative for cough and shortness of breath.    Cardiovascular: Negative for chest pain and leg swelling.   Gastrointestinal: Positive for abdominal pain, diarrhea, nausea and vomiting. Negative for blood in stool.   Genitourinary: Negative for difficulty urinating, dysuria and flank pain.   Musculoskeletal: Negative for arthralgias and back pain.   Skin: Negative.  Negative for rash and wound.   Allergic/Immunologic: Negative.    Neurological: Negative for dizziness, syncope, weakness, numbness and headaches.   Psychiatric/Behavioral: Negative for confusion.   All other systems reviewed and are negative.      Past Medical History:   Diagnosis Date   • Alcohol abuse    • Hypertension    • Pancreatitis        Allergies   Allergen Reactions   • Penicillins " Other (See Comments)     Unknown; patient was a child       No past surgical history on file.    No family history on file.    Social History     Socioeconomic History   • Marital status:      Spouse name: Not on file   • Number of children: Not on file   • Years of education: Not on file   • Highest education level: Not on file   Tobacco Use   • Smoking status: Current Every Day Smoker     Packs/day: 1.00     Types: Cigarettes   • Smokeless tobacco: Never Used   Vaping Use   • Vaping Use: Never used   Substance and Sexual Activity   • Alcohol use: Yes     Comment: ROUGHLY A FIFTH OF VODKA DAILY    • Drug use: Yes     Frequency: 1.0 times per week     Types: Benzodiazepines, Marijuana     Comment: every once in a while   • Sexual activity: Defer           Objective   Physical Exam  Vitals and nursing note reviewed.   Constitutional:       Appearance: He is well-developed.   HENT:      Head: Atraumatic.      Nose: Nose normal.   Eyes:      General: Lids are normal.      Conjunctiva/sclera: Conjunctivae normal.      Pupils: Pupils are equal, round, and reactive to light.   Cardiovascular:      Rate and Rhythm: Regular rhythm. Tachycardia present.      Heart sounds: Normal heart sounds.   Pulmonary:      Effort: Pulmonary effort is normal.      Breath sounds: Normal breath sounds.   Abdominal:      General: There is no distension.      Palpations: Abdomen is soft.      Tenderness: There is abdominal tenderness in the epigastric area, periumbilical area, left upper quadrant and left lower quadrant. There is no guarding or rebound.   Musculoskeletal:         General: No tenderness or deformity. Normal range of motion.      Cervical back: Normal range of motion and neck supple.   Skin:     General: Skin is warm and dry.   Neurological:      Mental Status: He is alert and oriented to person, place, and time.      Sensory: No sensory deficit.   Psychiatric:         Behavior: Behavior normal.          Procedures          ED Course  ED Course as of Mar 07 1623   Sun Mar 07, 2021   1431 WBC(!): 14.89 [RT]   1446 ALT (SGPT)(!): 105 [RT]   1446 AST (SGOT)(!): 128 [RT]   1446 Alkaline Phosphatase(!): 212 [RT]   1446 Lipase(!): 193 [RT]   1446 Ethanol(!): 42 [RT]   1446 Lactate(!!): 2.2 [RT]   1446 Methamphetamine, Ur(!): Positive [RT]   1446 Opiate Screen(!): Positive [RT]   1446 Amphetamine, Urine Qual(!): Positive [RT]   1446 Tricyclic Antidepressants Screen(!): Positive [RT]   1446 Buprenorphine, Screen, Urine(!): Positive [RT]   1605 Discussed patient with Dr. Lechuga who is agreeable with ED course and plan for admission.   Will give initial dose of sepsis antibiotics due to flagging for sepsis with vitals/labs.     [RT]   1615 Discussed patient with hospitalist Dr. Manzanares.     [RT]   1623 CIWA 4    [RT]      ED Course User Index  [RT] Angélica Whyte PA      Re-examined patient several times in ED. Pt resting and feeling better after meds/fluids. Discussed results and tx plan for admission. Pt's UDS collected prior to any meds in ED.     Reviewed old records.     Recent Results (from the past 24 hour(s))   Comprehensive Metabolic Panel    Collection Time: 03/07/21  1:48 PM    Specimen: Blood   Result Value Ref Range    Glucose 106 (H) 65 - 99 mg/dL    BUN 6 6 - 20 mg/dL    Creatinine 0.81 0.76 - 1.27 mg/dL    Sodium 139 136 - 145 mmol/L    Potassium 3.7 3.5 - 5.2 mmol/L    Chloride 99 98 - 107 mmol/L    CO2 23.0 22.0 - 29.0 mmol/L    Calcium 9.8 8.6 - 10.5 mg/dL    Total Protein 8.5 6.0 - 8.5 g/dL    Albumin 4.50 3.50 - 5.20 g/dL    ALT (SGPT) 105 (H) 1 - 41 U/L    AST (SGOT) 128 (H) 1 - 40 U/L    Alkaline Phosphatase 212 (H) 39 - 117 U/L    Total Bilirubin 0.8 0.0 - 1.2 mg/dL    eGFR Non African Amer 110 >60 mL/min/1.73    Globulin 4.0 gm/dL    A/G Ratio 1.1 g/dL    BUN/Creatinine Ratio 7.4 7.0 - 25.0    Anion Gap 17.0 (H) 5.0 - 15.0 mmol/L   Lipase    Collection Time: 03/07/21  1:48 PM    Specimen:  Blood   Result Value Ref Range    Lipase 193 (H) 13 - 60 U/L   Light Blue Top    Collection Time: 03/07/21  1:48 PM   Result Value Ref Range    Extra Tube hold for add-on    Green Top (Gel)    Collection Time: 03/07/21  1:48 PM   Result Value Ref Range    Extra Tube Hold for add-ons.    Lavender Top    Collection Time: 03/07/21  1:48 PM   Result Value Ref Range    Extra Tube hold for add-on    Gold Top - SST    Collection Time: 03/07/21  1:48 PM   Result Value Ref Range    Extra Tube Hold for add-ons.    Gray Top - Ice    Collection Time: 03/07/21  1:48 PM   Result Value Ref Range    Extra Tube Hold for add-ons.    CBC Auto Differential    Collection Time: 03/07/21  1:48 PM    Specimen: Blood   Result Value Ref Range    WBC 14.89 (H) 3.40 - 10.80 10*3/mm3    RBC 6.84 (H) 4.14 - 5.80 10*6/mm3    Hemoglobin 19.0 (H) 13.0 - 17.7 g/dL    Hematocrit 57.9 (H) 37.5 - 51.0 %    MCV 84.6 79.0 - 97.0 fL    MCH 27.8 26.6 - 33.0 pg    MCHC 32.8 31.5 - 35.7 g/dL    RDW 19.9 (H) 12.3 - 15.4 %    RDW-SD 58.3 (H) 37.0 - 54.0 fl    MPV 10.3 6.0 - 12.0 fL    Platelets 182 140 - 450 10*3/mm3    Neutrophil % 71.8 42.7 - 76.0 %    Lymphocyte % 15.8 (L) 19.6 - 45.3 %    Monocyte % 9.1 5.0 - 12.0 %    Eosinophil % 2.4 0.3 - 6.2 %    Basophil % 0.6 0.0 - 1.5 %    Immature Grans % 0.3 0.0 - 0.5 %    Neutrophils, Absolute 10.70 (H) 1.70 - 7.00 10*3/mm3    Lymphocytes, Absolute 2.35 0.70 - 3.10 10*3/mm3    Monocytes, Absolute 1.35 (H) 0.10 - 0.90 10*3/mm3    Eosinophils, Absolute 0.36 0.00 - 0.40 10*3/mm3    Basophils, Absolute 0.09 0.00 - 0.20 10*3/mm3    Immature Grans, Absolute 0.04 0.00 - 0.05 10*3/mm3    nRBC 0.0 0.0 - 0.2 /100 WBC   Lactic Acid, Plasma    Collection Time: 03/07/21  1:48 PM    Specimen: Blood   Result Value Ref Range    Lactate 2.2 (C) 0.5 - 2.0 mmol/L   Ethanol    Collection Time: 03/07/21  1:48 PM    Specimen: Blood   Result Value Ref Range    Ethanol 42 (H) 0 - 10 mg/dL   Urinalysis With Microscopic If Indicated (No  Culture) - Urine, Clean Catch    Collection Time: 03/07/21  1:58 PM    Specimen: Urine, Clean Catch   Result Value Ref Range    Color, UA Orange (A) Yellow, Straw    Appearance, UA Clear Clear    pH, UA <=5.0 5.0 - 8.0    Specific Gravity, UA 1.020 1.001 - 1.030    Glucose, UA Negative Negative    Ketones, UA 15 mg/dL (1+) (A) Negative    Bilirubin, UA Small (1+) (A) Negative    Blood, UA Negative Negative    Protein, UA Trace (A) Negative    Leuk Esterase, UA Trace (A) Negative    Nitrite, UA Positive (A) Negative    Urobilinogen, UA 1.0 E.U./dL 0.2 - 1.0 E.U./dL   Urine Drug Screen - Urine, Clean Catch    Collection Time: 03/07/21  1:58 PM    Specimen: Urine, Clean Catch   Result Value Ref Range    THC, Screen, Urine Negative Negative    Phencyclidine (PCP), Urine Negative Negative    Cocaine Screen, Urine Negative Negative    Methamphetamine, Ur Positive (A) Negative    Opiate Screen Positive (A) Negative    Amphetamine Screen, Urine Positive (A) Negative    Benzodiazepine Screen, Urine Negative Negative    Tricyclic Antidepressants Screen Positive (A) Negative    Methadone Screen, Urine Negative Negative    Barbiturates Screen, Urine Negative Negative    Oxycodone Screen, Urine Negative Negative    Propoxyphene Screen Negative Negative    Buprenorphine, Screen, Urine Positive (A) Negative   Urinalysis, Microscopic Only - Urine, Clean Catch    Collection Time: 03/07/21  1:58 PM    Specimen: Urine, Clean Catch   Result Value Ref Range    RBC, UA 0-2 None Seen, 0-2 /HPF    WBC, UA 0-2 None Seen, 0-2 /HPF    Bacteria, UA None Seen None Seen, Trace /HPF    Squamous Epithelial Cells, UA None Seen None Seen, 0-2 /HPF    Hyaline Casts, UA 0-6 0 - 6 /LPF    Methodology Automated Microscopy      Note: In addition to lab results from this visit, the labs listed above may include labs taken at another facility or during a different encounter within the last 24 hours. Please correlate lab times with ED admission and  "discharge times for further clarification of the services performed during this visit.    CT Abdomen Pelvis With Contrast   Preliminary Result   Mild peripancreatic stranding identified suggesting a mild   acute pancreatitis. Clinical correlation to lab values is recommended.       DICTATED:   03/07/2021   EDITED/ls :   03/07/2021            Vitals:    03/07/21 1336 03/07/21 1400 03/07/21 1500   BP: (!) 131/103 (!) 138/104 133/95   BP Location: Left arm Left arm    Patient Position: Sitting Sitting    Pulse: (!) 130 109 116   Resp: 22 20 20   Temp: 97.8 °F (36.6 °C)     TempSrc: Oral     SpO2: 100% 93% 93%   Weight: 68 kg (150 lb)     Height: 175.3 cm (69\")       Medications   Sodium Chloride (PF) 0.9 % 10 mL (has no administration in time range)   aztreonam (AZACTAM) 2 g/100 mL 0.9% NS (mbp) (has no administration in time range)   vancomycin 1250 mg/250 mL 0.9% NS IVPB ([facility]) (has no administration in time range)   Morphine sulfate (PF) injection 4 mg (has no administration in time range)   sodium chloride 0.9 % bolus 1,000 mL (0 mL Intravenous Stopped 3/7/21 1533)   famotidine (PEPCID) injection 20 mg (20 mg Intravenous Given 3/7/21 1447)   ondansetron (ZOFRAN) injection 4 mg (4 mg Intravenous Given 3/7/21 1447)   thiamine (B-1) 100 mg, folic acid 1 mg in sodium chloride 0.9 % 1,000 mL infusion (1,000 mL/hr Intravenous New Bag 3/7/21 1520)   LORazepam (ATIVAN) injection 1 mg (1 mg Intravenous Given 3/7/21 1520)   iopamidol (ISOVUE-300) 61 % injection 100 mL (90 mL Intravenous Given 3/7/21 1539)     ECG/EMG Results (last 24 hours)     ** No results found for the last 24 hours. **        No orders to display         COVID-19 RISK SCREEN     1. Has the patient had close contact without PPE with a lab confirmed COVID-19 (+) person or a person under investigation (PUI) for COVID-19 infection?  -- No     2. Has the patient had respiratory symptoms, worsened/new cough and/or SOA, unexplained fever, or sudden loss of smell " and/or taste in the past 7 days? --  No    3. Does the patient have baseline higher exposure risk such as working in healthcare field, currently residing in healthcare facility, or ongoing hemodialysis?  --  No                                          MDM    Final diagnoses:   Alcohol-induced acute pancreatitis, unspecified complication status   Elevated liver function tests   Elevated lactic acid level   Non-intractable vomiting with nausea, unspecified vomiting type   Drug abuse (CMS/HCC)            Angélica Whyte PA  03/07/21 1616       Angélica Whyte PA  03/07/21 1623      Electronically signed by Angel Lechuga MD at 03/07/21 1645       Vital Signs (last day)     Date/Time   Temp   Temp src   Pulse   Resp   BP   Patient Position   SpO2    03/07/21 1726   97.9 (36.6)   Oral   99   18   129/93   Lying   --    03/07/21 1700   --   --   112   18   129/97   --   96    03/07/21 1600   --   --   112   18   (!) 143/105   --   95    03/07/21 1500   --   --   116   20   133/95   --   93    03/07/21 1400   --   --   109   20   (!) 138/104   Sitting   93    03/07/21 1336   97.8 (36.6)   Oral   (!) 130   22   (!) 131/103   Sitting   100              Oxygen Therapy (last day)     Date/Time   SpO2   Device (Oxygen Therapy)   Flow (L/min)   Oxygen Concentration (%)   ETCO2 (mmHg)    03/07/21 1726   --   room air   --   --   --    03/07/21 1700   96   --   --   --   --    03/07/21 1600   95   --   --   --   --    03/07/21 1500   93   --   --   --   --    03/07/21 1400   93   room air   --   --   --    03/07/21 1336   100   room air   --   --   --              CIWA (last day)     Date/Time CIWA-Ar Score    03/07/21 1726  9    03/07/21 16:20:20  4    03/07/21 14:52:15  6          Current Facility-Administered Medications   Medication Dose Route Frequency Provider Last Rate Last Admin   • acetaminophen (TYLENOL) tablet 650 mg  650 mg Oral Q4H PRN Zackary Norman APRN        Or   • acetaminophen (TYLENOL) 160 MG/5ML solution  650 mg  650 mg Oral Q4H PRN Zackary Norman APRN        Or   • acetaminophen (TYLENOL) suppository 650 mg  650 mg Rectal Q4H PRN Zackary Norman APRN       • [START ON 3/8/2021] aztreonam (AZACTAM) 2 g/100 mL 0.9% NS (mbp)  2 g Intravenous Q8H Zackary Norman APRN       • bisacodyl (DULCOLAX) suppository 10 mg  10 mg Rectal Daily PRN Zackary Norman APRN       • chlordiazePOXIDE (LIBRIUM) capsule 50 mg  50 mg Oral Q6H Lesly Manzanares MD       • HYDROcodone-acetaminophen (NORCO) 5-325 MG per tablet 1 tablet  1 tablet Oral Q6H PRN Lesly Manzanares MD       • lactated ringers infusion  200 mL/hr Intravenous Continuous Zackary Norman APRN       • LORazepam (ATIVAN) tablet 1 mg  1 mg Oral Q2H PRN Lesly Manzanares MD        Or   • LORazepam (ATIVAN) injection 1 mg  1 mg Intravenous Q2H PRN Lesly Manzanares MD        Or   • LORazepam (ATIVAN) tablet 2 mg  2 mg Oral Q1H PRN Lesly Manzanares MD        Or   • LORazepam (ATIVAN) injection 2 mg  2 mg Intravenous Q1H PRN Lesly Manzanares MD        Or   • LORazepam (ATIVAN) injection 2 mg  2 mg Intravenous Q15 Min PRN Lesly Manzanares MD        Or   • LORazepam (ATIVAN) injection 2 mg  2 mg Intramuscular Q15 Min PRN Lesly Manzanares MD       • morphine injection 2 mg  2 mg Intravenous Q4H PRN Lesly Manzanares MD       • nicotine (NICODERM CQ) 21 MG/24HR patch 1 patch  1 patch Transdermal Q24H Zackary Norman APRN       • ondansetron (ZOFRAN) tablet 4 mg  4 mg Oral Q6H PRN Zackary Norman APRN        Or   • ondansetron (ZOFRAN) injection 4 mg  4 mg Intravenous Q6H PRN Zackary Norman APRN       • sennosides-docusate (PERICOLACE) 8.6-50 MG per tablet 2 tablet  2 tablet Oral BID PRN Zackary Norman APRN       • Sodium Chloride (PF) 0.9 % 10 mL  10 mL Intravenous PRN Zackary Norman APRN       • sodium chloride 0.9 % flush 10 mL  10 mL Intravenous Q12H Zackary Norman APRN       • sodium chloride 0.9 % flush 10 mL  10 mL Intravenous PRN Zackary Norman APRN       • [START ON 3/8/2021]  thiamine (B-1) 100 mg, folic acid 1 mg in dextrose 5 % and sodium chloride 0.9 % 1,000 mL infusion  125 mL/hr Intravenous Daily Zackary Norman APRN       • vancomycin 1250 mg/250 mL 0.9% NS IVPB ([facility])  20 mg/kg Intravenous Once Angélica Whyte PA   1,250 mg at 03/07/21 1710       Lab Results (last 24 hours)     Procedure Component Value Units Date/Time    Urine Culture - Urine, Urine, Clean Catch [[id number]] Collected: 03/07/21 1358    Specimen: Urine, Clean Catch Updated: 03/07/21 1820    Timed Lactic Acid, Reflex [[id number]]  (Normal) Collected: 03/07/21 1658    Specimen: Blood Updated: 03/07/21 1719     Lactate 0.9 mmol/L      Comment: Falsely depressed results may occur on samples drawn from patients receiving N-Acetylcysteine (NAC) or Metamizole.       [place] Draw [[id number]] Collected: 03/07/21 1348    Specimen: Blood Updated: 03/07/21 1500    Narrative:      The following orders were created for panel order [place] Draw.  Procedure                               Abnormality         Status                     ---------                               -----------         ------                     Light Blue Top[[id number]]                                   Final result               Green Top (Gel)[[id number]]                                  Final result               Lavender Top[[id number]]                                     Final result               Gold Top - SST[[id number]]                                   Final result               Gray Top - Ice[[id number]]                                   Final result                 Please view results for these tests on the individual orders.    Light Blue Top [[id number]] Collected: 03/07/21 1348    Specimen: Blood Updated: 03/07/21 1500     Extra Tube hold for add-on     Comment: Auto resulted       Lavender Top [[id number]] Collected: 03/07/21 1348    Specimen: Blood Updated: 03/07/21 1500     Extra Tube hold for add-on     Comment: Auto resulted       Green Top  (Gel) [[id number]] Collected: 03/07/21 1348    Specimen: Blood Updated: 03/07/21 1500     Extra Tube Hold for add-ons.     Comment: Auto resulted.       Gold Top - SST [[id number]] Collected: 03/07/21 1348    Specimen: Blood Updated: 03/07/21 1500     Extra Tube Hold for add-ons.     Comment: Auto resulted.       Gray Top - Ice [[id number]] Collected: 03/07/21 1348    Specimen: Blood Updated: 03/07/21 1500     Extra Tube Hold for add-ons.     Comment: Auto resulted.       Urinalysis, Microscopic Only - Urine, Clean Catch [[id number]] Collected: 03/07/21 1358    Specimen: Urine, Clean Catch Updated: 03/07/21 1452     RBC, UA 0-2 /HPF      WBC, UA 0-2 /HPF      Bacteria, UA None Seen /HPF      Squamous Epithelial Cells, UA None Seen /HPF      Hyaline Casts, UA 0-6 /LPF      Methodology Automated Microscopy    Urinalysis With Microscopic If Indicated (No Culture) - Urine, Clean Catch [[id number]]  (Abnormal) Collected: 03/07/21 1358    Specimen: Urine, Clean Catch Updated: 03/07/21 1452     Color, UA [place]     Appearance, UA Clear     pH, UA <=5.0     Specific Gravity, UA 1.020     Glucose, UA Negative     Ketones, UA 15 mg/dL (1+)     Bilirubin, UA Small (1+)     Blood, UA Negative     Protein, UA Trace     Leuk Esterase, UA Trace     Nitrite, UA Positive     Urobilinogen, UA 1.0 E.U./dL    Lactic Acid, Plasma [[id number]]  (Abnormal) Collected: 03/07/21 1348    Specimen: Blood Updated: 03/07/21 1444     Lactate 2.2 mmol/L      Comment: Falsely depressed results may occur on samples drawn from patients receiving N-Acetylcysteine (NAC) or Metamizole.       Urine Drug Screen - Urine, Clean Catch [[id number]]  (Abnormal) Collected: 03/07/21 1358    Specimen: Urine, Clean Catch Updated: 03/07/21 1443     THC, Screen, Urine Negative     Phencyclidine (PCP), Urine Negative     Cocaine Screen, Urine Negative     Methamphetamine, Ur Positive     Opiate Screen Positive     Amphetamine Screen, Urine Positive     Benzodiazepine  Screen, Urine Negative     Tricyclic Antidepressants Screen Positive     Methadone Screen, Urine Negative     Barbiturates Screen, Urine Negative     Oxycodone Screen, Urine Negative     Propoxyphene Screen Negative     Buprenorphine, Screen, Urine Positive    Narrative:      Cutoff For Drugs Screened:    Amphetamines               500 ng/ml  Barbiturates               200 ng/ml  Benzodiazepines            150 ng/ml  Cocaine                    150 ng/ml  Methadone                  200 ng/ml  Opiates                    100 ng/ml  Phencyclidine               25 ng/ml  THC                            50 ng/ml  Methamphetamine            500 ng/ml  Tricyclic Antidepressants  300 ng/ml  Oxycodone                  100 ng/ml  Propoxyphene               300 ng/ml  Buprenorphine               10 ng/ml    The normal value for all drugs tested is negative. This report includes unconfirmed screening results, with the cutoff values listed, to be used for medical treatment purposes only.  Unconfirmed results must not be used for non-medical purposes such as employment or legal testing.  Clinical consideration should be applied to any drug of abuse test, particularly when unconfirmed results are used.      Ethanol [[id number]]  (Abnormal) Collected: 03/07/21 1348    Specimen: Blood Updated: 03/07/21 1441     Ethanol 42 mg/dL     Comprehensive Metabolic Panel [[id number]]  (Abnormal) Collected: 03/07/21 1348    Specimen: Blood Updated: 03/07/21 1434     Glucose 106 mg/dL      BUN 6 mg/dL      Creatinine 0.81 mg/dL      Sodium 139 mmol/L      Potassium 3.7 mmol/L      Chloride 99 mmol/L      CO2 23.0 mmol/L      Calcium 9.8 mg/dL      Total Protein 8.5 g/dL      Albumin 4.50 g/dL      ALT (SGPT) 105 U/L      AST (SGOT) 128 U/L      Alkaline Phosphatase 212 U/L      Total Bilirubin 0.8 mg/dL      eGFR Non African Amer 110 mL/min/1.73      Globulin 4.0 gm/dL      A/G Ratio 1.1 g/dL      BUN/Creatinine Ratio 7.4     Anion Gap 17.0 mmol/L      Narrative:      GFR Normal >60  Chronic Kidney Disease <60  Kidney Failure <15      Lipase [[id number]]  (Abnormal) Collected: 03/07/21 1348    Specimen: Blood Updated: 03/07/21 1434     Lipase 193 U/L     Blood Culture - Blood, Arm, Right [[id number]] Collected: 03/07/21 1350    Specimen: Blood from Arm, Right Updated: 03/07/21 1404    Blood Culture - Blood, Arm, Right [[id number]] Collected: 03/07/21 1350    Specimen: Blood from Arm, Right Updated: 03/07/21 1404    CBC & Differential [[id number]]  (Abnormal) Collected: 03/07/21 1348    Specimen: Blood Updated: 03/07/21 1403    Narrative:      The following orders were created for panel order CBC & Differential.  Procedure                               Abnormality         Status                     ---------                               -----------         ------                     CBC Auto Differential[[id number]]        Abnormal            Final result                 Please view results for these tests on the individual orders.    CBC Auto Differential [[id number]]  (Abnormal) Collected: 03/07/21 1348    Specimen: Blood Updated: 03/07/21 1403     WBC 14.89 10*3/mm3      RBC 6.84 10*6/mm3      Hemoglobin 19.0 g/dL      Hematocrit 57.9 %      MCV 84.6 fL      MCH 27.8 pg      MCHC 32.8 g/dL      RDW 19.9 %      RDW-SD 58.3 fl      MPV 10.3 fL      Platelets 182 10*3/mm3      Neutrophil % 71.8 %      Lymphocyte % 15.8 %      Monocyte % 9.1 %      Eosinophil % 2.4 %      Basophil % 0.6 %      Immature Grans % 0.3 %      Neutrophils, Absolute 10.70 10*3/mm3      Lymphocytes, Absolute 2.35 10*3/mm3      Monocytes, Absolute 1.35 10*3/mm3      Eosinophils, Absolute 0.36 10*3/mm3      Basophils, Absolute 0.09 10*3/mm3      Immature Grans, Absolute 0.04 10*3/mm3      nRBC 0.0 /100 WBC         Imaging Results (Last 24 Hours)     Procedure Component Value Units Date/Time    CT Abdomen Pelvis With Contrast [[id number]] Collected: 03/07/21 1545     Updated: 03/07/21 3867     Narrative:      EXAMINATION: CT ABDOMEN/PELVIS W CONTRAST - 03/07/2021      INDICATION: Abdominal pain.     TECHNIQUE: Multiple axial CT imaging is obtained of the abdomen and  pelvis following the administration of intravenous contrast.     The radiation dose reduction device was turned on for each scan per the  ALARA (As Low as Reasonably Achievable) protocol.     COMPARISON: 10/20/2020     FINDINGS:     ABDOMEN: Atelectatic changes in the lung bases bilaterally. The liver is  fatty infiltrated. Spleen is unremarkable. There is an incidental  pericardial cyst identified anteriorly within the right lower lobe. The  cyst measures 4.6 x 2.4 cm. Kidneys and adrenal glands within normal  limits. Pancreas reveals some mild peripancreatic stranding suggesting  possibly a mild acute pancreatitis. Correlation to clinical lab values  is recommended. No abdominal or retroperitoneal lymphadenopathy. The  abdominal portion of the gastrointestinal tract within normal limits. No  free fluid or free air.     PELVIS: Pelvic organs are unremarkable. The pelvic portion of the  gastrointestinal tract within normal limits. The appendix is normal. No  free fluid or free air. No abnormal mass or fluid collections  identified. The bony structures are unremarkable.     Delayed imaging reveals contrast seen in the renal collecting systems  bilaterally as well as within the ureters and bladder.       Impression:      Mild peripancreatic stranding identified suggesting a mild  acute pancreatitis. Clinical correlation to lab values is recommended.     DICTATED:   03/07/2021  EDITED/ls :   03/07/2021           Orders (last 24 hrs)      Start     Ordered    03/08/21 0900  thiamine (B-1) 100 mg, folic acid 1 mg in dextrose 5 % and sodium chloride 0.9 % 1,000 mL infusion  Daily      03/07/21 1744    03/08/21 0600  Inpatient Opioid Addiction Triage Consult  Once     Provider:  (Not yet assigned)    03/07/21 1749    03/08/21 0600  CBC Auto  Differential  Morning Draw      03/07/21 1749    03/08/21 0600  Comprehensive Metabolic Panel  Morning Draw      03/07/21 1749    03/08/21 0600  Lactic Acid, Plasma  Morning Draw      03/07/21 1749    03/08/21 0600  Lipase  Morning Draw      03/07/21 1749    03/08/21 0600  Lipid Panel  Morning Draw      03/07/21 1749    03/08/21 0600  Magnesium  Morning Draw      03/07/21 1749    03/08/21 0600  Procalcitonin  Morning Draw      03/07/21 1749    03/08/21 0600  Hemoglobin A1c  Morning Draw      03/07/21 1749    03/08/21 0400  aztreonam (AZACTAM) 2 g/100 mL 0.9% NS (mbp)  Every 8 Hours      03/07/21 1749    03/07/21 2100  sodium chloride 0.9 % flush 10 mL  Every 12 Hours Scheduled      03/07/21 1749    03/07/21 2000  Vital Signs  Every 4 Hours      03/07/21 1749    03/07/21 1845  lactated ringers infusion  Continuous      03/07/21 1749    03/07/21 1845  nicotine (NICODERM CQ) 21 MG/24HR patch 1 patch  Every 24 Hours      03/07/21 1749    03/07/21 1845  chlordiazePOXIDE (LIBRIUM) capsule 50 mg  Every 6 Hours Scheduled      03/07/21 1749    03/07/21 1845  aztreonam (AZACTAM) 2 g/100 mL 0.9% NS (mbp)  Once,   Status:  Discontinued     Note to Pharmacy: Dose administered in ED.    03/07/21 1749 03/07/21 1821  Urine Culture - Urine, Urine, Clean Catch  Once     Comments: Please ensure 'Use Existing Specimen' is selected if this order is for urine culture add-on.  If no button appears, please contact the lab for assistance.      03/07/21 1820    03/07/21 1810  Inpatient Admission  Once      03/07/21 1809    03/07/21 1750  Intake & Output  Every Shift      03/07/21 1749    03/07/21 1750  Weigh Patient  Once      03/07/21 1749    03/07/21 1750  Oxygen Therapy- Nasal Cannula; Titrate for SPO2: 90% - 95%  Continuous      03/07/21 1749    03/07/21 1750  Insert Peripheral IV  Once      03/07/21 1749    03/07/21 1750  Saline Lock & Maintain IV Access  Continuous      03/07/21 1749    03/07/21 1750  Place Sequential Compression  Device  Once      03/07/21 1749    03/07/21 1750  Maintain Sequential Compression Device  Continuous      03/07/21 1749    03/07/21 1750  Pulse Oximetry, Continuous  Continuous,   Status:  Canceled      03/07/21 1749    03/07/21 1750  Cardiac Monitoring  Continuous      03/07/21 1749    03/07/21 1750  Follow Standard Precautions  Once      03/07/21 1749    03/07/21 1750  Fall Precautions  Continuous      03/07/21 1749    03/07/21 1750  Notify Provider (With Default Parameters)  Until Discontinued      03/07/21 1749    03/07/21 1750  NPO Diet  Diet Effective Now      03/07/21 1749    03/07/21 1750  Urinalysis With Culture If Indicated - Urine, Clean Catch  Once,   Status:  Canceled     Comments: Please ensure 'Use Existing Specimen' is selected if this order is for urine culture add-on.  If no button appears, please contact the lab for assistance.      03/07/21 1749    03/07/21 1750  COVID PRE-OP / PRE-PROCEDURE SCREENING ORDER (NO ISOLATION) - Swab, Nasopharynx  Once      03/07/21 1749    03/07/21 1750  Vital Signs  Per Hospital Policy      03/07/21 1749    03/07/21 1750  Continuous Pulse Oximetry  Continuous      03/07/21 1749    03/07/21 1750  Obtain Baseline Clinical [place] Withdrawal Assessment - Ar, Sedation Scale & Vital Signs  Once     Comments: Follow CIWA Scoring Reference Guide    03/07/21 1749    03/07/21 1750  Clinical [place] Withdrawal Assessment (CIWA-Ar)  Per Hospital Policy      03/07/21 1749    03/07/21 1750  If CIWA Score Less Than 8 Monitor Every 4 Hours & Then Discontinue Assessment - Restart Scoring Assessment & Protocol If Symptoms Reappear  Continuous      03/07/21 1749    03/07/21 1750  Obtain Pre & Post Sedation Scores With Every Lorazepam Dose - Hold For POSS Greater Than 2 or RASS of -3 or Less  Continuous      03/07/21 1749    03/07/21 1750  Seizure Precautions  Continuous      03/07/21 1749    03/07/21 1750  Notify Provider - Withdrawal  Until Discontinued      03/07/21 1749     03/07/21 1750  Notify Provider of Abnormal Lab Results  Until Discontinued      03/07/21 1749    03/07/21 1750  Notify Provider - Vitals  Until Discontinued      03/07/21 1749    03/07/21 1750  Safety Precautions  Continuous      03/07/21 1749 03/07/21 1750  COVID-19 PCR, [facility], NP SWAB IN [facility] TRANSPORT MEDIA 24-30 HR TAT - Swab, Nasopharynx  PROCEDURE ONCE      03/07/21 1750    03/07/21 1749  acetaminophen (TYLENOL) tablet 650 mg  Every 4 Hours PRN      03/07/21 1749    03/07/21 1749  acetaminophen (TYLENOL) 160 MG/5ML solution 650 mg  Every 4 Hours PRN      03/07/21 1749    03/07/21 1749  acetaminophen (TYLENOL) suppository 650 mg  Every 4 Hours PRN      03/07/21 1749    03/07/21 1749  ondansetron (ZOFRAN) tablet 4 mg  Every 6 Hours PRN      03/07/21 1749    03/07/21 1749  ondansetron (ZOFRAN) injection 4 mg  Every 6 Hours PRN      03/07/21 1749    03/07/21 1749  LORazepam (ATIVAN) tablet 1 mg  Every 2 Hours PRN      03/07/21 1749    03/07/21 1749  LORazepam (ATIVAN) injection 1 mg  Every 2 Hours PRN      03/07/21 1749    03/07/21 1749  LORazepam (ATIVAN) tablet 2 mg  Every 1 Hour PRN      03/07/21 1749    03/07/21 1749  LORazepam (ATIVAN) injection 2 mg  Every 1 Hour PRN      03/07/21 1749    03/07/21 1749  LORazepam (ATIVAN) injection 2 mg  Every 15 Minutes PRN      03/07/21 1749    03/07/21 1749  LORazepam (ATIVAN) injection 2 mg  Every 15 Minutes PRN      03/07/21 1749    03/07/21 1749  sodium chloride 0.9 % flush 10 mL  As Needed      03/07/21 1749    03/07/21 1749  sennosides-docusate (PERICOLACE) 8.6-50 MG per tablet 2 tablet  2 Times Daily PRN      03/07/21 1749    03/07/21 1749  morphine injection 2 mg  Every 4 Hours PRN      03/07/21 1749    03/07/21 1749  bisacodyl (DULCOLAX) suppository 10 mg  Daily PRN      03/07/21 1749    03/07/21 1749  HYDROcodone-acetaminophen (NORCO) 5-325 MG per tablet 1 tablet  Every 6 Hours PRN      03/07/21 1749    03/07/21 1701  Code Status and Medical  Interventions:  Continuous      03/07/21 1705    03/07/21 1648  Timed Lactic Acid, Reflex  PROCEDURE ONCE      03/07/21 1444    03/07/21 1635  Tele Bed Request ([facility] ONLY)  Once      03/07/21 1634    03/07/21 1614  Morphine sulfate (PF) injection 4 mg  Once      03/07/21 1612    03/07/21 1609  aztreonam (AZACTAM) 2 g/100 mL 0.9% NS (mbp)  Once      03/07/21 1607    03/07/21 1609  vancomycin 1250 mg/250 mL 0.9% NS IVPB ([facility])  Once      03/07/21 1607    03/07/21 1541  iopamidol (ISOVUE-300) 61 % injection 100 mL  Once in Imaging      03/07/21 1539    03/07/21 1506  Clinical [place] Withdrawal Assessment  Once      03/07/21 1506    03/07/21 1503  LORazepam (ATIVAN) injection 1 mg  Once      03/07/21 1501    03/07/21 1501  thiamine (B-1) 100 mg, folic acid 1 mg in sodium chloride 0.9 % 1,000 mL infusion  Once      03/07/21 1459    03/07/21 1451  Urinalysis, Microscopic Only - Urine, Clean Catch  Once      03/07/21 1450    03/07/21 1419  sodium chloride 0.9 % bolus 1,000 mL  Once      03/07/21 1417    03/07/21 1419  famotidine (PEPCID) injection 20 mg  Once      03/07/21 1417    03/07/21 1419  ondansetron (ZOFRAN) injection 4 mg  Once      03/07/21 1417    03/07/21 1418  CT Abdomen Pelvis With Contrast  1 Time Imaging     Comments: IV CONTRAST ONLY      03/07/21 1417    03/07/21 1418  Urine Drug Screen - Urine, Clean Catch  STAT      03/07/21 1417    03/07/21 1418  Ethanol  STAT      03/07/21 1417    03/07/21 1344  Blood Culture - Blood, Arm, Right  Once      03/07/21 1343    03/07/21 1344  Blood Culture - Blood, Arm, Right  Once      03/07/21 1343    03/07/21 1344  Lactic Acid, Plasma  Once      03/07/21 1343    03/07/21 1338  NPO Diet  Diet Effective Now,   Status:  Canceled      03/07/21 1337    03/07/21 1338  Undress and Gown  Once      03/07/21 1337    03/07/21 1338  Insert Peripheral IV  Once      03/07/21 1337    03/07/21 1338  [place] Draw  Once      03/07/21 1337    03/07/21 1338  CBC & Differential  Once       03/07/21 1337    03/07/21 1338  Comprehensive Metabolic Panel  Once      03/07/21 1337    03/07/21 1338  Lipase  Once      03/07/21 1337    03/07/21 1338  Urinalysis With Microscopic If Indicated (No Culture) - Urine, Clean Catch  Once      03/07/21 1337    03/07/21 1338  POC Pregnancy, Urine  Once,   Status:  Canceled      03/07/21 1337    03/07/21 1338  Light Blue Top  PROCEDURE ONCE      03/07/21 1337    03/07/21 1338  Green Top (Gel)  PROCEDURE ONCE      03/07/21 1337    03/07/21 1338  Lavender Top  PROCEDURE ONCE      03/07/21 1337    03/07/21 1338  Gold Top - SST  PROCEDURE ONCE      03/07/21 1337    03/07/21 1338  Gray Top - Ice  PROCEDURE ONCE      03/07/21 1337    03/07/21 1338  CBC Auto Differential  PROCEDURE ONCE      03/07/21 1337    03/07/21 1337  Sodium Chloride (PF) 0.9 % 10 mL  As Needed      03/07/21 1337    Unscheduled  Up With Assistance  As Needed      03/07/21 1749                Physician Progress Notes (last 24 hours) (Notes from 03/06/21 1822 through 03/07/21 1822)    No notes of this type exist for this encounter.         Medical Student Notes (last 24 hours) (Notes from 03/06/21 1822 through 03/07/21 1822)    No notes of this type exist for this encounter.         Consult Notes (last 24 hours) (Notes from 03/06/21 1822 through 03/07/21 1822)    No notes of this type exist for this encounter.

## 2021-03-08 LAB
ALBUMIN SERPL-MCNC: 3.2 G/DL (ref 3.5–5.2)
ALBUMIN/GLOB SERPL: 1 G/DL
ALP SERPL-CCNC: 140 U/L (ref 39–117)
ALT SERPL W P-5'-P-CCNC: 72 U/L (ref 1–41)
ANION GAP SERPL CALCULATED.3IONS-SCNC: 8 MMOL/L (ref 5–15)
AST SERPL-CCNC: 80 U/L (ref 1–40)
BACTERIA SPEC AEROBE CULT: NO GROWTH
BASOPHILS # BLD AUTO: 0.06 10*3/MM3 (ref 0–0.2)
BASOPHILS NFR BLD AUTO: 1.1 % (ref 0–1.5)
BILIRUB SERPL-MCNC: 0.8 MG/DL (ref 0–1.2)
BUN SERPL-MCNC: 7 MG/DL (ref 6–20)
BUN/CREAT SERPL: 8.8 (ref 7–25)
CALCIUM SPEC-SCNC: 8.5 MG/DL (ref 8.6–10.5)
CHLORIDE SERPL-SCNC: 107 MMOL/L (ref 98–107)
CHOLEST SERPL-MCNC: 110 MG/DL (ref 0–200)
CO2 SERPL-SCNC: 26 MMOL/L (ref 22–29)
CREAT SERPL-MCNC: 0.8 MG/DL (ref 0.76–1.27)
D-LACTATE SERPL-SCNC: 0.8 MMOL/L (ref 0.5–2)
DEPRECATED RDW RBC AUTO: 61.9 FL (ref 37–54)
EOSINOPHIL # BLD AUTO: 0.32 10*3/MM3 (ref 0–0.4)
EOSINOPHIL NFR BLD AUTO: 6 % (ref 0.3–6.2)
ERYTHROCYTE [DISTWIDTH] IN BLOOD BY AUTOMATED COUNT: 19.4 % (ref 12.3–15.4)
GFR SERPL CREATININE-BSD FRML MDRD: 112 ML/MIN/1.73
GLOBULIN UR ELPH-MCNC: 3.1 GM/DL
GLUCOSE BLDC GLUCOMTR-MCNC: 81 MG/DL (ref 70–130)
GLUCOSE SERPL-MCNC: 83 MG/DL (ref 65–99)
HBA1C MFR BLD: 5.3 % (ref 4.8–5.6)
HCT VFR BLD AUTO: 48.5 % (ref 37.5–51)
HDLC SERPL-MCNC: 38 MG/DL (ref 40–60)
HGB BLD-MCNC: 15 G/DL (ref 13–17.7)
IMM GRANULOCYTES # BLD AUTO: 0.02 10*3/MM3 (ref 0–0.05)
IMM GRANULOCYTES NFR BLD AUTO: 0.4 % (ref 0–0.5)
LDLC SERPL CALC-MCNC: 55 MG/DL (ref 0–100)
LDLC/HDLC SERPL: 1.43 {RATIO}
LIPASE SERPL-CCNC: 186 U/L (ref 13–60)
LYMPHOCYTES # BLD AUTO: 1.71 10*3/MM3 (ref 0.7–3.1)
LYMPHOCYTES NFR BLD AUTO: 31.9 % (ref 19.6–45.3)
MAGNESIUM SERPL-MCNC: 1.8 MG/DL (ref 1.6–2.6)
MCH RBC QN AUTO: 27.2 PG (ref 26.6–33)
MCHC RBC AUTO-ENTMCNC: 30.9 G/DL (ref 31.5–35.7)
MCV RBC AUTO: 87.9 FL (ref 79–97)
MONOCYTES # BLD AUTO: 0.58 10*3/MM3 (ref 0.1–0.9)
MONOCYTES NFR BLD AUTO: 10.8 % (ref 5–12)
NEUTROPHILS NFR BLD AUTO: 2.67 10*3/MM3 (ref 1.7–7)
NEUTROPHILS NFR BLD AUTO: 49.8 % (ref 42.7–76)
NRBC BLD AUTO-RTO: 0 /100 WBC (ref 0–0.2)
PLATELET # BLD AUTO: 122 10*3/MM3 (ref 140–450)
PMV BLD AUTO: 10.7 FL (ref 6–12)
POTASSIUM SERPL-SCNC: 4.5 MMOL/L (ref 3.5–5.2)
PROCALCITONIN SERPL-MCNC: 0.13 NG/ML (ref 0–0.25)
PROT SERPL-MCNC: 6.3 G/DL (ref 6–8.5)
RBC # BLD AUTO: 5.52 10*6/MM3 (ref 4.14–5.8)
SODIUM SERPL-SCNC: 141 MMOL/L (ref 136–145)
TRIGL SERPL-MCNC: 88 MG/DL (ref 0–150)
VLDLC SERPL-MCNC: 17 MG/DL (ref 5–40)
WBC # BLD AUTO: 5.36 10*3/MM3 (ref 3.4–10.8)

## 2021-03-08 PROCEDURE — 25010000002 MORPHINE PER 10 MG: Performed by: INTERNAL MEDICINE

## 2021-03-08 PROCEDURE — 80053 COMPREHEN METABOLIC PANEL: CPT | Performed by: NURSE PRACTITIONER

## 2021-03-08 PROCEDURE — 25010000002 ONDANSETRON PER 1 MG: Performed by: NURSE PRACTITIONER

## 2021-03-08 PROCEDURE — 83735 ASSAY OF MAGNESIUM: CPT | Performed by: NURSE PRACTITIONER

## 2021-03-08 PROCEDURE — 25010000002 LORAZEPAM PER 2 MG: Performed by: INTERNAL MEDICINE

## 2021-03-08 PROCEDURE — 80061 LIPID PANEL: CPT | Performed by: NURSE PRACTITIONER

## 2021-03-08 PROCEDURE — 85025 COMPLETE CBC W/AUTO DIFF WBC: CPT | Performed by: NURSE PRACTITIONER

## 2021-03-08 PROCEDURE — 25010000003 MAGNESIUM SULFATE 4 GM/100ML SOLUTION: Performed by: FAMILY MEDICINE

## 2021-03-08 PROCEDURE — 83690 ASSAY OF LIPASE: CPT | Performed by: NURSE PRACTITIONER

## 2021-03-08 PROCEDURE — 84145 PROCALCITONIN (PCT): CPT | Performed by: NURSE PRACTITIONER

## 2021-03-08 PROCEDURE — 83036 HEMOGLOBIN GLYCOSYLATED A1C: CPT | Performed by: NURSE PRACTITIONER

## 2021-03-08 PROCEDURE — 25010000002 THIAMINE PER 100 MG: Performed by: NURSE PRACTITIONER

## 2021-03-08 PROCEDURE — 82962 GLUCOSE BLOOD TEST: CPT

## 2021-03-08 PROCEDURE — 83605 ASSAY OF LACTIC ACID: CPT | Performed by: NURSE PRACTITIONER

## 2021-03-08 PROCEDURE — 99232 SBSQ HOSP IP/OBS MODERATE 35: CPT | Performed by: FAMILY MEDICINE

## 2021-03-08 RX ORDER — BUPRENORPHINE HYDROCHLORIDE AND NALOXONE HYDROCHLORIDE DIHYDRATE 8; 2 MG/1; MG/1
1 TABLET SUBLINGUAL DAILY
COMMUNITY

## 2021-03-08 RX ORDER — MAGNESIUM SULFATE HEPTAHYDRATE 40 MG/ML
2 INJECTION, SOLUTION INTRAVENOUS AS NEEDED
Status: DISCONTINUED | OUTPATIENT
Start: 2021-03-08 | End: 2021-03-09 | Stop reason: HOSPADM

## 2021-03-08 RX ORDER — MAGNESIUM SULFATE HEPTAHYDRATE 40 MG/ML
4 INJECTION, SOLUTION INTRAVENOUS AS NEEDED
Status: DISCONTINUED | OUTPATIENT
Start: 2021-03-08 | End: 2021-03-09 | Stop reason: HOSPADM

## 2021-03-08 RX ORDER — FOLIC ACID 1 MG/1
1 TABLET ORAL DAILY
Status: DISCONTINUED | OUTPATIENT
Start: 2021-03-10 | End: 2021-03-09 | Stop reason: HOSPADM

## 2021-03-08 RX ADMIN — LORAZEPAM 1 MG: 2 INJECTION INTRAMUSCULAR; INTRAVENOUS at 12:00

## 2021-03-08 RX ADMIN — CHLORDIAZEPOXIDE HYDROCHLORIDE 50 MG: 25 CAPSULE ORAL at 23:32

## 2021-03-08 RX ADMIN — MORPHINE SULFATE 2 MG: 2 INJECTION, SOLUTION INTRAMUSCULAR; INTRAVENOUS at 10:48

## 2021-03-08 RX ADMIN — HYDROCODONE BITARTRATE AND ACETAMINOPHEN 1 TABLET: 5; 325 TABLET ORAL at 20:55

## 2021-03-08 RX ADMIN — MORPHINE SULFATE 2 MG: 2 INJECTION, SOLUTION INTRAMUSCULAR; INTRAVENOUS at 06:39

## 2021-03-08 RX ADMIN — SODIUM CHLORIDE, POTASSIUM CHLORIDE, SODIUM LACTATE AND CALCIUM CHLORIDE 150 ML/HR: 600; 310; 30; 20 INJECTION, SOLUTION INTRAVENOUS at 20:56

## 2021-03-08 RX ADMIN — AZTREONAM 2 G: 2 INJECTION, POWDER, LYOPHILIZED, FOR SOLUTION INTRAMUSCULAR; INTRAVENOUS at 03:34

## 2021-03-08 RX ADMIN — CHLORDIAZEPOXIDE HYDROCHLORIDE 50 MG: 25 CAPSULE ORAL at 11:59

## 2021-03-08 RX ADMIN — Medication 1 PATCH: at 17:27

## 2021-03-08 RX ADMIN — SODIUM CHLORIDE, POTASSIUM CHLORIDE, SODIUM LACTATE AND CALCIUM CHLORIDE 200 ML/HR: 600; 310; 30; 20 INJECTION, SOLUTION INTRAVENOUS at 04:41

## 2021-03-08 RX ADMIN — THIAMINE HYDROCHLORIDE 125 ML/HR: 100 INJECTION, SOLUTION INTRAMUSCULAR; INTRAVENOUS at 10:48

## 2021-03-08 RX ADMIN — MORPHINE SULFATE 2 MG: 2 INJECTION, SOLUTION INTRAMUSCULAR; INTRAVENOUS at 14:49

## 2021-03-08 RX ADMIN — CHLORDIAZEPOXIDE HYDROCHLORIDE 50 MG: 25 CAPSULE ORAL at 06:38

## 2021-03-08 RX ADMIN — SODIUM CHLORIDE, PRESERVATIVE FREE 10 ML: 5 INJECTION INTRAVENOUS at 21:21

## 2021-03-08 RX ADMIN — HYDROCODONE BITARTRATE AND ACETAMINOPHEN 1 TABLET: 5; 325 TABLET ORAL at 09:18

## 2021-03-08 RX ADMIN — LORAZEPAM 1 MG: 2 INJECTION INTRAMUSCULAR; INTRAVENOUS at 06:38

## 2021-03-08 RX ADMIN — HYDROCODONE BITARTRATE AND ACETAMINOPHEN 1 TABLET: 5; 325 TABLET ORAL at 14:49

## 2021-03-08 RX ADMIN — CHLORDIAZEPOXIDE HYDROCHLORIDE 50 MG: 25 CAPSULE ORAL at 01:01

## 2021-03-08 RX ADMIN — MORPHINE SULFATE 2 MG: 2 INJECTION, SOLUTION INTRAMUSCULAR; INTRAVENOUS at 19:22

## 2021-03-08 RX ADMIN — LORAZEPAM 1 MG: 2 INJECTION INTRAMUSCULAR; INTRAVENOUS at 20:55

## 2021-03-08 RX ADMIN — CHLORDIAZEPOXIDE HYDROCHLORIDE 50 MG: 25 CAPSULE ORAL at 17:26

## 2021-03-08 RX ADMIN — MAGNESIUM SULFATE HEPTAHYDRATE 4 G: 40 INJECTION, SOLUTION INTRAVENOUS at 20:55

## 2021-03-08 RX ADMIN — LORAZEPAM 2 MG: 2 INJECTION INTRAMUSCULAR; INTRAVENOUS at 17:26

## 2021-03-08 RX ADMIN — LORAZEPAM 2 MG: 2 INJECTION INTRAMUSCULAR; INTRAVENOUS at 15:45

## 2021-03-08 RX ADMIN — AZTREONAM 2 G: 2 INJECTION, POWDER, LYOPHILIZED, FOR SOLUTION INTRAMUSCULAR; INTRAVENOUS at 12:00

## 2021-03-08 RX ADMIN — LORAZEPAM 2 MG: 2 INJECTION INTRAMUSCULAR; INTRAVENOUS at 09:19

## 2021-03-08 RX ADMIN — ONDANSETRON 4 MG: 2 INJECTION INTRAMUSCULAR; INTRAVENOUS at 06:39

## 2021-03-08 RX ADMIN — HYDROCODONE BITARTRATE AND ACETAMINOPHEN 1 TABLET: 5; 325 TABLET ORAL at 00:54

## 2021-03-08 RX ADMIN — SODIUM CHLORIDE, PRESERVATIVE FREE 10 ML: 5 INJECTION INTRAVENOUS at 10:49

## 2021-03-08 RX ADMIN — LORAZEPAM 1 MG: 2 INJECTION INTRAMUSCULAR; INTRAVENOUS at 01:01

## 2021-03-08 NOTE — PLAN OF CARE
Goal Outcome Evaluation:  VSS, room air, NSR to Sinus Tach on tele, pt denied n/v. Pt c/o pain several times, PRNs administered per order. AxOx4. Will continue to monitor.

## 2021-03-08 NOTE — PROGRESS NOTES
[facility] Medicine Services  PROGRESS NOTE    Patient Name: Eusebio Holley  : 1990  MRN: [id number]    Date of Admission: 3/7/2021  Primary Care Physician: Alexis Watters MD    Subjective   Subjective     CC:  F/U alcohol pancreatitis     HPI:  Pt seen and examined. RN notes reviewed. No acute events overnight. Pt feeling better. Wants to try to eat. No vomiting. Pain controlled. States he's anxious.     ROS:  Gen- No fevers, chills  CV- No chest pain, palpitations  Resp- No cough, dyspnea  GI- No V/D, abd pain, +mild nausea     Objective   Objective     Vital Signs:   Temp:  [97.4 °F (36.3 °C)-98.3 °F (36.8 °C)] 98.3 °F (36.8 °C)  Heart Rate:  [] 107  Resp:  [16-22] 16  BP: (113-143)/() 116/86        Physical Exam:  Constitutional: No acute distress, awake, alert  HENT: NCAT, mucous membranes moist  Respiratory: Clear to auscultation bilaterally, respiratory effort normal   Cardiovascular: RRR, no murmurs, rubs, or gallops  Gastrointestinal: Positive bowel sounds, soft, nontender, nondistended  Musculoskeletal: No bilateral ankle edema  Psychiatric: Very flat affect, cooperative  Neurologic: Oriented x 3, no focal deficits   Skin: No rashes    Results Reviewed:  Results from last 7 days   Lab Units 21  0711 21  0710 21  1348   WBC 10*3/mm3  --  5.36 14.89*   HEMOGLOBIN g/dL  --  15.0 19.0*   HEMATOCRIT %  --  48.5 57.9*   PLATELETS 10*3/mm3  --  122* 182   PROCALCITONIN ng/mL 0.13  --   --      Results from last 7 days   Lab Units 21  0711 21  1348   SODIUM mmol/L 141 139   POTASSIUM mmol/L 4.5 3.7   CHLORIDE mmol/L 107 99   CO2 mmol/L 26.0 23.0   BUN mg/dL 7 6   CREATININE mg/dL 0.80 0.81   GLUCOSE mg/dL 83 106*   CALCIUM mg/dL 8.5* 9.8   ALT (SGPT) U/L 72* 105*   AST (SGOT) U/L 80* 128*     Estimated Creatinine Clearance: 141.4 mL/min (by C-G formula based on SCr of 0.8 mg/dL).    Microbiology Results Abnormal     Procedure  Component Value - Date/Time    Urine Culture - Urine, Urine, Clean Catch [[id number]]  (Normal) Collected: 03/07/21 1358    Lab Status: Preliminary result Specimen: Urine, Clean Catch Updated: 03/08/21 0924     Urine Culture No growth          Imaging Results (Last 24 Hours)     Procedure Component Value Units Date/Time    CT Abdomen Pelvis With Contrast [[id number]] Collected: 03/07/21 1545     Updated: 03/08/21 1040    Narrative:      EXAMINATION: CT ABDOMEN/PELVIS W CONTRAST - 03/07/2021      INDICATION: Abdominal pain.     TECHNIQUE: Multiple axial CT imaging is obtained of the abdomen and  pelvis following the administration of intravenous contrast.     The radiation dose reduction device was turned on for each scan per the  ALARA (As Low as Reasonably Achievable) protocol.     COMPARISON: 10/20/2020     FINDINGS:     ABDOMEN: Atelectatic changes in the lung bases bilaterally. The liver is  fatty infiltrated. Spleen is unremarkable. There is an incidental  pericardial cyst identified anteriorly within the right lower lobe. The  cyst measures 4.6 x 2.4 cm. Kidneys and adrenal glands within normal  limits. Pancreas reveals some mild peripancreatic stranding suggesting  possibly a mild acute pancreatitis. Correlation to clinical lab values  is recommended. No abdominal or retroperitoneal lymphadenopathy. The  abdominal portion of the gastrointestinal tract within normal limits. No  free fluid or free air.     PELVIS: Pelvic organs are unremarkable. The pelvic portion of the  gastrointestinal tract within normal limits. The appendix is normal. No  free fluid or free air. No abnormal mass or fluid collections  identified. The bony structures are unremarkable.     Delayed imaging reveals contrast seen in the renal collecting systems  bilaterally as well as within the ureters and bladder.       Impression:      Mild peripancreatic stranding identified suggesting a mild  acute pancreatitis. Clinical correlation to lab  values is recommended.     DICTATED:   03/07/2021  EDITED/ls :   03/07/2021     This report was finalized on 3/8/2021 10:37 AM by Dr. Brandee Aguilera MD.                 I have reviewed the medications:  Scheduled Meds:aztreonam, 2 g, Intravenous, Q8H  chlordiazePOXIDE, 50 mg, Oral, Q6H  [START ON 3/10/2021] folic acid, 1 mg, Oral, Daily  nicotine, 1 patch, Transdermal, Q24H  sodium chloride, 10 mL, Intravenous, Q12H  IV Fluids 1000 mL + additives, 125 mL/hr, Intravenous, Daily  [START ON 3/10/2021] thiamine, 100 mg, Oral, Daily      Continuous Infusions:lactated ringers, 150 mL/hr, Last Rate: 150 mL/hr (03/08/21 1118)      PRN Meds:.•  acetaminophen **OR** acetaminophen **OR** acetaminophen  •  bisacodyl  •  HYDROcodone-acetaminophen  •  LORazepam **OR** LORazepam **OR** LORazepam **OR** LORazepam **OR** LORazepam **OR** LORazepam  •  Morphine  •  ondansetron **OR** ondansetron  •  senna-docusate sodium  •  Sodium Chloride (PF)  •  sodium chloride    Assessment/Plan   Assessment & Plan     Active Hospital Problems    Diagnosis  POA   • **Acute alcoholic pancreatitis [K85.20]  Yes   • Erythrocytosis [D75.1]  Yes   • Alcohol-induced acute pancreatitis [K85.20]  Yes   • Alcohol abuse [F10.10]  Yes   • Lactic acidosis [E87.2]  Yes   • Essential hypertension [I10]  Yes   • Suboxone/narcotic abuse, reportedly buys this on the street [F11.10]  Yes      Resolved Hospital Problems   No resolved problems to display.        Brief Hospital Course to date:  Eusebio Holley is a 30 y.o. male has medical history significant for pancreatitis, hepatitis, EtOH abuse, Suboxone use, opioid abuse, polysubstance abuse, and hypertension who presents to the ED with complaints of abdominal pain that radiates to his back, decreased appetite, nausea, and vomiting for the past 3 days. He typically drinks a liter of liquor daily. His last drink was approximately two hours prior to coming to the ED.    This patient's problems and plans were  partially entered by my partner and updated as appropriate by me 03/08/21.  All problems are new to me today     ETOH Pancreatitis, recurrrent   Lactic Acidosis: resolved  -Continue supportive care with IVF, anti-emetics and pain control  -Decrease IVF to 150cc/hr  -Clear liquid diet      ETOH Abuse; history of withdrawal symptoms   Wishes to quit  -reportedly drinks 1 Liter of liquor daily  -Last drink 3/7 about 11am  -Banana bag x 3 days, then PO   -Scheduled Librium 50 mg every 6 hours  -CIWA protocol with PRN Ativan  -Addiction team to see      Etohic hepatitis  - Supportive care; LFTs trending down      Erythrocytosis   -Resolved     Possible UTI  -Cx with no growth, DC ABX     Polysubstance abuse  -UDS + for amphetamine, buprenorphine, methamphetamine, opiate, and TCA  -Addiction team to see      Tobacco abuse  -Continue Nicotine patch     Migraine, acute  - at home he uses imitrex   - supportive care for now     DVT Prophylaxis:  Mechanical    Disposition: I expect the patient to be discharged 1-2 days    CODE STATUS:   Code Status and Medical Interventions:   Ordered at: 03/07/21 1705     Level Of Support Discussed With:    Patient     Code Status:    CPR     Medical Interventions (Level of Support Prior to Arrest):    Full       Marycruz Stahl,   03/08/21

## 2021-03-08 NOTE — PROGRESS NOTES
Discharge Planning Assessment  [facility]     Patient Name: Eusebio Holley  MRN: [id number]  Today's Date: 3/8/2021    Admit Date: 3/7/2021    Discharge Needs Assessment     Row Name 03/08/21 1138       Living Environment    Lives With  parent(s)    Current Living Arrangements  home/apartment/condo    Primary Care Provided by  self    Provides Primary Care For  no one    Family Caregiver if Needed  parent(s)    Quality of Family Relationships  unable to assess    Able to Return to Prior Arrangements  yes       Resource/Environmental Concerns    Resource/Environmental Concerns  none       Transition Planning    Patient/Family Anticipates Transition to  home with family    Patient/Family Anticipated Services at Transition      Transportation Anticipated  family or friend will provide       Discharge Needs Assessment    Readmission Within the Last 30 Days  no previous admission in last 30 days    Equipment Currently Used at Home  none    Concerns to be Addressed  discharge planning    Anticipated Changes Related to Illness  none    Equipment Needed After Discharge  none        Discharge Plan     Row Name 03/08/21 1139       Plan    Plan  Ongiong    Patient/Family in Agreement with Plan  yes    Plan Comments  Spoke with patient in room to initiate discharge planning.  He lives with his father in [facility].  He is independent with ADL's.  He has no DME at home and is not current with home health.  Mr. Holley has RX coverage and has his scripts filled at [facility].  Chemical dependcy consulted.  Patient states that his plan is eith er home or possibly rehab.  He denies any needs at this time.  CM will continue to follow.  Mary Villaseñor RN [phone]    Final Discharge Disposition Code  30 - still a patient        Continued Care and Services - Admitted Since 3/7/2021    Coordination has not been started for this encounter.         Demographic Summary     Row Name 03/08/21 1137       General Information     Arrived From  emergency department    Referral Source  admission list    Reason for Consult  discharge planning    Preferred Language  English     Used During This Interaction  no    General Information Comments  PCP-Alexis Watters        Functional Status     Row Name 03/08/21 1137       Functional Status    Usual Activity Tolerance  good    Current Activity Tolerance  good       Functional Status, IADL    Medications  independent    Meal Preparation  independent    Housekeeping  independent    Laundry  independent    Shopping  independent        Psychosocial    No documentation.       Abuse/Neglect    No documentation.       Legal     Row Name 03/08/21 6350       Financial/Legal    Finance Comments  Verified with patient that he has [place] Medicaid.  No issues obtaining medications.        Substance Abuse    No documentation.       Patient Forms    No documentation.           Mary Villaseñor RN

## 2021-03-09 VITALS
WEIGHT: 166.3 LBS | SYSTOLIC BLOOD PRESSURE: 135 MMHG | RESPIRATION RATE: 16 BRPM | HEART RATE: 99 BPM | BODY MASS INDEX: 24.63 KG/M2 | TEMPERATURE: 98.4 F | DIASTOLIC BLOOD PRESSURE: 91 MMHG | OXYGEN SATURATION: 94 % | HEIGHT: 69 IN

## 2021-03-09 LAB
ANION GAP SERPL CALCULATED.3IONS-SCNC: 5 MMOL/L (ref 5–15)
BUN SERPL-MCNC: 5 MG/DL (ref 6–20)
BUN/CREAT SERPL: 8.2 (ref 7–25)
CALCIUM SPEC-SCNC: 8.7 MG/DL (ref 8.6–10.5)
CHLORIDE SERPL-SCNC: 109 MMOL/L (ref 98–107)
CO2 SERPL-SCNC: 25 MMOL/L (ref 22–29)
CREAT SERPL-MCNC: 0.61 MG/DL (ref 0.76–1.27)
DEPRECATED RDW RBC AUTO: 58.7 FL (ref 37–54)
ERYTHROCYTE [DISTWIDTH] IN BLOOD BY AUTOMATED COUNT: 19.6 % (ref 12.3–15.4)
GFR SERPL CREATININE-BSD FRML MDRD: >150 ML/MIN/1.73
GLUCOSE SERPL-MCNC: 98 MG/DL (ref 65–99)
HCT VFR BLD AUTO: 46.6 % (ref 37.5–51)
HGB BLD-MCNC: 14.9 G/DL (ref 13–17.7)
MAGNESIUM SERPL-MCNC: 2.3 MG/DL (ref 1.6–2.6)
MCH RBC QN AUTO: 27.4 PG (ref 26.6–33)
MCHC RBC AUTO-ENTMCNC: 32 G/DL (ref 31.5–35.7)
MCV RBC AUTO: 85.8 FL (ref 79–97)
PLATELET # BLD AUTO: 136 10*3/MM3 (ref 140–450)
PMV BLD AUTO: 10.7 FL (ref 6–12)
POTASSIUM SERPL-SCNC: 3.7 MMOL/L (ref 3.5–5.2)
RBC # BLD AUTO: 5.43 10*6/MM3 (ref 4.14–5.8)
SODIUM SERPL-SCNC: 139 MMOL/L (ref 136–145)
WBC # BLD AUTO: 5.51 10*3/MM3 (ref 3.4–10.8)

## 2021-03-09 PROCEDURE — 83735 ASSAY OF MAGNESIUM: CPT | Performed by: FAMILY MEDICINE

## 2021-03-09 PROCEDURE — 80048 BASIC METABOLIC PNL TOTAL CA: CPT | Performed by: FAMILY MEDICINE

## 2021-03-09 PROCEDURE — 85027 COMPLETE CBC AUTOMATED: CPT | Performed by: FAMILY MEDICINE

## 2021-03-09 PROCEDURE — 25010000002 THIAMINE PER 100 MG: Performed by: NURSE PRACTITIONER

## 2021-03-09 PROCEDURE — 25010000002 LORAZEPAM PER 2 MG: Performed by: INTERNAL MEDICINE

## 2021-03-09 PROCEDURE — 25010000002 MORPHINE PER 10 MG: Performed by: INTERNAL MEDICINE

## 2021-03-09 PROCEDURE — 99233 SBSQ HOSP IP/OBS HIGH 50: CPT | Performed by: NURSE PRACTITIONER

## 2021-03-09 PROCEDURE — 25010000002 ONDANSETRON PER 1 MG: Performed by: NURSE PRACTITIONER

## 2021-03-09 PROCEDURE — 97162 PT EVAL MOD COMPLEX 30 MIN: CPT

## 2021-03-09 RX ORDER — HYDROCODONE BITARTRATE AND ACETAMINOPHEN 7.5; 325 MG/1; MG/1
1 TABLET ORAL EVERY 6 HOURS PRN
Status: DISCONTINUED | OUTPATIENT
Start: 2021-03-09 | End: 2021-03-09 | Stop reason: HOSPADM

## 2021-03-09 RX ORDER — PANTOPRAZOLE SODIUM 40 MG/1
40 TABLET, DELAYED RELEASE ORAL
Status: DISCONTINUED | OUTPATIENT
Start: 2021-03-09 | End: 2021-03-09 | Stop reason: HOSPADM

## 2021-03-09 RX ADMIN — CHLORDIAZEPOXIDE HYDROCHLORIDE 50 MG: 25 CAPSULE ORAL at 06:45

## 2021-03-09 RX ADMIN — THIAMINE HYDROCHLORIDE 125 ML/HR: 100 INJECTION, SOLUTION INTRAMUSCULAR; INTRAVENOUS at 07:35

## 2021-03-09 RX ADMIN — MORPHINE SULFATE 2 MG: 2 INJECTION, SOLUTION INTRAMUSCULAR; INTRAVENOUS at 02:15

## 2021-03-09 RX ADMIN — HYDROCODONE BITARTRATE AND ACETAMINOPHEN 1 TABLET: 5; 325 TABLET ORAL at 06:45

## 2021-03-09 RX ADMIN — MORPHINE SULFATE 2 MG: 2 INJECTION, SOLUTION INTRAMUSCULAR; INTRAVENOUS at 13:01

## 2021-03-09 RX ADMIN — LORAZEPAM 1 MG: 1 TABLET ORAL at 02:14

## 2021-03-09 RX ADMIN — ONDANSETRON 4 MG: 2 INJECTION INTRAMUSCULAR; INTRAVENOUS at 07:41

## 2021-03-09 RX ADMIN — SODIUM CHLORIDE, POTASSIUM CHLORIDE, SODIUM LACTATE AND CALCIUM CHLORIDE 150 ML/HR: 600; 310; 30; 20 INJECTION, SOLUTION INTRAVENOUS at 04:16

## 2021-03-09 RX ADMIN — MORPHINE SULFATE 2 MG: 2 INJECTION, SOLUTION INTRAMUSCULAR; INTRAVENOUS at 07:33

## 2021-03-09 RX ADMIN — SODIUM CHLORIDE, PRESERVATIVE FREE 10 ML: 5 INJECTION INTRAVENOUS at 07:35

## 2021-03-09 RX ADMIN — CHLORDIAZEPOXIDE HYDROCHLORIDE 50 MG: 25 CAPSULE ORAL at 11:26

## 2021-03-09 RX ADMIN — HYDROCODONE BITARTRATE AND ACETAMINOPHEN 1 TABLET: 7.5; 325 TABLET ORAL at 12:15

## 2021-03-09 RX ADMIN — PANTOPRAZOLE SODIUM 40 MG: 40 TABLET, DELAYED RELEASE ORAL at 12:15

## 2021-03-09 RX ADMIN — LORAZEPAM 1 MG: 2 INJECTION INTRAMUSCULAR; INTRAVENOUS at 07:33

## 2021-03-09 NOTE — NURSING NOTE
"Pt requests frequent doses of ativan although he is not scoring for them. He is laying in bed with no distress noted. States that he wants to leave AMA due to \"not getting his ativan or pain medicine often enough.\" MD and APRN notified of patients wish. AMA paperwork filled out and IV removed.   "

## 2021-03-09 NOTE — PLAN OF CARE
VSS, NS on Tele, Pt has rested well this shift with continuous complaints of pain that are not relieved with PRN analgesia, but Pt displays no nonverbal symptoms of pain. Pt repeatedly requests medication, including ativan, however CIWA scores were fairly consistently 10 or below. Will continue to assess and monitor.       Problem: Adult Inpatient Plan of Care  Goal: Absence of Hospital-Acquired Illness or Injury  Intervention: Identify and Manage Fall Risk  Recent Flowsheet Documentation  Taken 3/9/2021 0215 by Kemar Arredondo RN  Safety Promotion/Fall Prevention:   activity supervised   assistive device/personal items within reach   clutter free environment maintained   fall prevention program maintained   nonskid shoes/slippers when out of bed   room organization consistent   safety round/check completed  Taken 3/9/2021 0010 by Kemar Arredondo RN  Safety Promotion/Fall Prevention:   activity supervised   assistive device/personal items within reach   clutter free environment maintained   fall prevention program maintained   nonskid shoes/slippers when out of bed   room organization consistent   safety round/check completed  Taken 3/8/2021 2240 by Kemar Arredondo RN  Safety Promotion/Fall Prevention:   activity supervised   assistive device/personal items within reach   clutter free environment maintained   fall prevention program maintained   nonskid shoes/slippers when out of bed   room organization consistent   safety round/check completed  Taken 3/8/2021 2055 by Kemar Arredondo, MIKAYLA  Safety Promotion/Fall Prevention:   activity supervised   assistive device/personal items within reach   clutter free environment maintained   fall prevention program maintained   nonskid shoes/slippers when out of bed   room organization consistent   safety round/check completed  Intervention: Prevent Skin Injury  Recent Flowsheet Documentation  Taken 3/9/2021 0215 by Kemar Arredondo RN  Body Position: position changed independently  Taken  3/9/2021 0010 by Kemar Arredondo RN  Body Position: position changed independently  Taken 3/8/2021 2240 by Kemar Arredondo RN  Body Position: position changed independently  Taken 3/8/2021 2055 by Kemar Arredondo RN  Body Position: position changed independently  Intervention: Prevent and Manage VTE (venous thromboembolism) Risk  Recent Flowsheet Documentation  Taken 3/8/2021 2055 by Kemar Arredondo RN  VTE Prevention/Management:   bilateral   dorsiflexion/plantar flexion performed   sequential compression devices on  Intervention: Prevent Infection  Recent Flowsheet Documentation  Taken 3/8/2021 2240 by Kemar Arredondo RN  Infection Prevention:   environmental surveillance performed   equipment surfaces disinfected   single patient room provided  Taken 3/8/2021 2055 by Kemar Arredondo RN  Infection Prevention:   environmental surveillance performed   equipment surfaces disinfected   single patient room provided  Goal: Optimal Comfort and Wellbeing  Intervention: Provide Person-Centered Care  Recent Flowsheet Documentation  Taken 3/8/2021 2055 by Kemar Arredondo RN  Trust Relationship/Rapport:   care explained   choices provided   questions answered   questions encouraged   thoughts/feelings acknowledged     Problem: Pain (Pancreatitis)  Goal: Acceptable Pain Control  Intervention: Monitor and Manage Pain  Recent Flowsheet Documentation  Taken 3/9/2021 0215 by Kemar Arredondo RN  Pain Management Interventions: see MAR  Taken 3/8/2021 2055 by Kemar Arredondo RN  Diversional Activities:   Class Messenger   television     Problem: Respiratory Compromise (Pancreatitis)  Goal: Effective Oxygenation and Ventilation  Intervention: Promote Airway Secretion Clearance  Recent Flowsheet Documentation  Taken 3/9/2021 0215 by Kemar Arredondo RN  Activity Management: activity adjusted per tolerance  Cough And Deep Breathing: done independently per patient  Taken 3/9/2021 0010 by Kemar Arredondo RN  Activity Management: activity adjusted per  tolerance  Cough And Deep Breathing: done independently per patient  Taken 3/8/2021 2240 by Kemar Arredondo RN  Activity Management: activity adjusted per tolerance  Cough And Deep Breathing: done independently per patient  Taken 3/8/2021 2055 by Kemar Arredondo RN  Activity Management: activity adjusted per tolerance  Cough And Deep Breathing: done independently per patient  Intervention: Optimize Oxygenation and Ventilation  Recent Flowsheet Documentation  Taken 3/9/2021 0215 by Kemar Arredondo RN  Activity Management: activity adjusted per tolerance  Cough And Deep Breathing: done independently per patient  Taken 3/9/2021 0010 by Kemar Arredondo RN  Activity Management: activity adjusted per tolerance  Cough And Deep Breathing: done independently per patient  Taken 3/8/2021 2240 by Kemar Arredondo RN  Activity Management: activity adjusted per tolerance  Cough And Deep Breathing: done independently per patient  Taken 3/8/2021 2055 by Kemar Arredondo RN  Activity Management: activity adjusted per tolerance  Cough And Deep Breathing: done independently per patient   Goal Outcome Evaluation:

## 2021-03-09 NOTE — PLAN OF CARE
Problem: Adult Inpatient Plan of Care  Goal: Plan of Care Review  Recent Flowsheet Documentation  Taken 3/9/2021 0804 by Wanda Maldonado, PT  Progress: no change  Plan of Care Reviewed With: patient  Outcome Summary:   PT eval complete. Pt presents with decreased endurance and strength warranting PT services. Pt performed bed mobility with mod I, transferred with SPV, and ambulated 190' with CGA (1st 25 ft with RW, remaining without AD). Pt limited primarily d/t pain and weakness. Pt reports plans to attend OP rehab but does not wish to return to home environment   encouraged pt to discuss this with CM. Recommend OPPT services at discharge following chemical dependency rehab pending progress.   Goal Outcome Evaluation:  Plan of Care Reviewed With: patient  Progress: no change  Outcome Summary: PT eval complete. Pt presents with decreased endurance and strength warranting PT services. Pt performed bed mobility with mod I, transferred with SPV, and ambulated 190' with CGA (1st 25 ft with RW, remaining without AD). Pt limited primarily d/t pain and weakness. Pt reports plans to attend OP rehab but does not wish to return to home environment; encouraged pt to discuss this with CM. Recommend OPPT services at discharge following chemical dependency rehab pending progress.

## 2021-03-09 NOTE — PROGRESS NOTES
"    [facility] Medicine Services  PROGRESS NOTE    Patient Name: Eusebio Holley  : 1990  MRN: [id number]    Date of Admission: 3/7/2021  Primary Care Physician: Alexis Watters MD    Subjective   Subjective     CC:  F/U alcohol pancreatitis     HPI:  Patient seen resting up in bed in no acute distress.  No visitors at bedside.  Very flat affect, whispering voice.  Complains of mild nausea but no vomiting.  Right intermittent abdominal pain 6/10 scale that \"comes in waves\".  Asking me for increased pain medication.  Nurse reports he is asking for frequent pain meds IV as well as Ativan.  Remains irritated by staff that he cannot have the medications he wants.      ROS:  Gen- No fevers, chills  CV- No chest pain, palpitations  Resp- No cough, dyspnea  GI- No V/D, mild intermittent abdominal cramps, +mild nausea     Objective   Objective     Vital Signs:   Temp:  [97.3 °F (36.3 °C)-98.4 °F (36.9 °C)] 98.4 °F (36.9 °C)  Heart Rate:  [83-99] 99  Resp:  [16-18] 16  BP: (114-135)/() 135/91        Physical Exam:  Constitutional: No acute distress, awake, alert.  Resting up in bed.  No visitors at bedside.  No current signs of tremor, diaphoresis, or obvious discomfort on exam  HENT: NCAT, mucous membranes moist  Respiratory: Clear to auscultation bilaterally A/P, respiratory effort normal on RA.  Sats WNL.  Cardiovascular: RRR, no murmurs, rubs, or gallops  Gastrointestinal: Positive bowel sounds, soft, nontender to palpation, nondistended  Musculoskeletal: No bilateral ankle edema.  M AE spontaneously  Psychiatric: Very flat affect, almost monotone.  Poor eye contact.  Whispering voice.  Cooperative  Neurologic: Oriented x 3, no focal deficits.  Speech clear and appropriate but whispering.  Follows commands.  Skin: No rashes.  Multiple BSA tattoos.    Results Reviewed:  Results from last 7 days   Lab Units 21  0651 21  0711 21  0710 21  1348   WBC " 10*3/mm3 5.51  --  5.36 14.89*   HEMOGLOBIN g/dL 14.9  --  15.0 19.0*   HEMATOCRIT % 46.6  --  48.5 57.9*   PLATELETS 10*3/mm3 136*  --  122* 182   PROCALCITONIN ng/mL  --  0.13  --   --      Results from last 7 days   Lab Units 03/09/21  0651 03/08/21  0711 03/07/21  1348   SODIUM mmol/L 139 141 139   POTASSIUM mmol/L 3.7 4.5 3.7   CHLORIDE mmol/L 109* 107 99   CO2 mmol/L 25.0 26.0 23.0   BUN mg/dL 5* 7 6   CREATININE mg/dL 0.61* 0.80 0.81   GLUCOSE mg/dL 98 83 106*   CALCIUM mg/dL 8.7 8.5* 9.8   ALT (SGPT) U/L  --  72* 105*   AST (SGOT) U/L  --  80* 128*     Estimated Creatinine Clearance: 185.4 mL/min (A) (by C-G formula based on SCr of 0.61 mg/dL (L)).    Microbiology Results Abnormal     Procedure Component Value - Date/Time    Urine Culture - Urine, Urine, Clean Catch [[id number]]  (Normal) Collected: 03/07/21 1358    Lab Status: Final result Specimen: Urine, Clean Catch Updated: 03/08/21 1856     Urine Culture No growth    Blood Culture - Blood, Arm, Right [[id number]] Collected: 03/07/21 1350    Lab Status: Preliminary result Specimen: Blood from Arm, Right Updated: 03/08/21 1415     Blood Culture No growth at 24 hours    Blood Culture - Blood, Arm, Right [[id number]] Collected: 03/07/21 1350    Lab Status: Preliminary result Specimen: Blood from Arm, Right Updated: 03/08/21 1415     Blood Culture No growth at 24 hours          Imaging Results (Last 24 Hours)     ** No results found for the last 24 hours. **              I have reviewed the medications:  Scheduled Meds:chlordiazePOXIDE, 50 mg, Oral, Q6H  [START ON 3/10/2021] folic acid, 1 mg, Oral, Daily  nicotine, 1 patch, Transdermal, Q24H  pantoprazole, 40 mg, Oral, Q AM  sodium chloride, 10 mL, Intravenous, Q12H  [START ON 3/10/2021] thiamine, 100 mg, Oral, Daily      Continuous Infusions:lactated ringers, 150 mL/hr, Last Rate: 150 mL/hr (03/09/21 0416)      PRN Meds:.•  acetaminophen **OR** acetaminophen **OR** acetaminophen  •  bisacodyl  •   HYDROcodone-acetaminophen  •  LORazepam **OR** LORazepam **OR** LORazepam **OR** LORazepam **OR** LORazepam **OR** LORazepam  •  magnesium sulfate **OR** magnesium sulfate **OR** magnesium sulfate  •  Morphine  •  ondansetron **OR** ondansetron  •  senna-docusate sodium  •  Sodium Chloride (PF)  •  sodium chloride    Assessment/Plan   Assessment & Plan     Active Hospital Problems    Diagnosis  POA   • **Acute alcoholic pancreatitis [K85.20]  Yes   • Erythrocytosis [D75.1]  Yes   • Alcohol-induced acute pancreatitis [K85.20]  Yes   • Alcohol abuse [F10.10]  Yes   • Lactic acidosis [E87.2]  Yes   • Essential hypertension [I10]  Yes   • Suboxone/narcotic abuse, reportedly buys this on the street [F11.10]  Yes      Resolved Hospital Problems   No resolved problems to display.        Brief Hospital Course to date:  Eusebio Holley is a 30 y.o. male has medical history significant for pancreatitis, hepatitis, EtOH abuse, Suboxone use, opioid abuse, polysubstance abuse, and hypertension who presents to the ED with complaints of abdominal pain that radiates to his back, decreased appetite, nausea, and vomiting for the past 3 days. He typically drinks a liter of liquor daily. His last drink was approximately two hours prior to coming to the ED.    This patient's problems and plans were partially entered by my partner and updated as appropriate by me 03/09/21.    Assessment/plan:  Patient is new to me today     ETOH Pancreatitis, recurrrent   Lactic Acidosis: resolved  -Continue supportive care with IVF, anti-emetics and pain control  -IV fluids infusing  -Tolerating solid diet, eating everything on history  --No pain on palpation currently, however reports his abdominal pain comes and goes and is 6 on 10 scale.  Intermittent nausea associated with meals but no vomiting  --Insisting on increased pain medication stating Lortab fives are not enough for him.  Agreed to increase Lortab to 7.5 mg p.o. every 6.  No change in his  "IV morphine at this time.  Continue current Librium dosing.  --We will add PPI for reflux issues     ETOH Abuse; history of withdrawal symptoms   Wishes to quit  -reportedly drinks 1 Liter of liquor daily  -Last drink 3/7 about 11am  -Banana bag x 3 days, then PO   -Scheduled Librium 50 mg every 6 hours  -CIWA protocol with PRN Ativan  -Addiction team following.  Patient known to them.  --Patient is insisting on increased doses of pain pills as this dose \"does not work well\".  Also wanting his morphine increased stating he always gets it more than he is getting.  Also asking nursing regularly for Ativan when he is not scoring is seen to get it.     Etohic hepatitis  - Supportive care; LFTs trending down      Erythrocytosis   -Resolved     Possible UTI  -Cx with no growth, DC'd ABX     Polysubstance abuse  -UDS + for amphetamine, buprenorphine, methamphetamine, opiate, and TCA  -Addiction team following     Tobacco abuse  -Continue Nicotine patch  --Assist/advise     Migraine, acute  - at home he uses imitrex   - supportive care for now   --Resolved    DVT Prophylaxis:  Mechanical    Disposition: I expect the patient to be discharged 1-2 days pending improvement in labs and symptoms.    CODE STATUS:   Code Status and Medical Interventions:   Ordered at: 03/07/21 1705     Level Of Support Discussed With:    Patient     Code Status:    CPR     Medical Interventions (Level of Support Prior to Arrest):    Full       Miryam Angelo, APRN  03/09/21              "

## 2021-03-09 NOTE — PROGRESS NOTES
"Continued Stay Note  [facility]     Patient Name: Eusebio Holley  MRN: [id number]  Today's Date: 3/9/2021    Admit Date: 3/7/2021    Discharge Plan     Row Name 03/09/21 1356       Plan    Plan Ongoing    Plan Comments Approximately 45 minutes ago our team went to see the patient. He was sitting up in the bed and advised us, \"It's time for my medicine, can we delay this conversation?\" Due to the patient being unwilling to speak with us we left. Explained to patient that we will follow up with him later.        Discharge Codes    No documentation.             Manasa Mckeon     Chemical Dependency Team  Ext. [phone]    "

## 2021-03-09 NOTE — THERAPY EVALUATION
Patient Name: Eusebio Holley  : 1990    MRN: [id number]                              Today's Date: 3/9/2021       Admit Date: 3/7/2021    Visit Dx:     ICD-10-CM ICD-9-CM   1. Alcohol-induced acute pancreatitis, unspecified complication status  K85.20 577.0   2. Elevated liver function tests  R79.89 790.6   3. Elevated lactic acid level  R79.89 276.2   4. Non-intractable vomiting with nausea, unspecified vomiting type  R11.2 787.01   5. Drug abuse (CMS/[facility])  F19.10 305.90     Patient Active Problem List   Diagnosis   • Acute alcoholic pancreatitis   • Suboxone/narcotic abuse, reportedly buys this on the street   • Essential hypertension   • Lactic acidosis   • Delirium tremens (CMS/[facility])   • Broken tooth   • Dental abscess   • Alcohol abuse   • Erythrocytosis   • Alcohol-induced acute pancreatitis     Past Medical History:   Diagnosis Date   • Alcohol abuse    • Hypertension    • Pancreatitis      No past surgical history on file.  General Information     Row Name 21          Physical Therapy Time and Intention    Document Type  evaluation  -     Mode of Treatment  physical therapy  -     Row Name 21          General Information    Patient Profile Reviewed  yes  -AC     Prior Level of Function  independent:;all household mobility;community mobility;gait;transfer;ADL's  -     Existing Precautions/Restrictions  fall  -     Barriers to Rehab  ineffective coping;family issues CIWA; family issues per pt report, wishing not to return to home environment  -     Row Name 21          Living Environment    Lives With  parent(s) Father and step-mother  -     Row Name 21 [facility]          Home Main Entrance    Number of Stairs, Main Entrance  three 3 stairs descending into apartment  -     Stair Railings, Main Entrance  railings on both sides of stairs  -     Row Name 21 08          Stairs Within Home, Primary    Number of Stairs, Within Home, Primary  none   -     Row Name 03/09/21 0804          Cognition    Orientation Status (Cognition)  oriented x 4  -     Row Name 03/09/21 0804          Safety Issues, Functional Mobility    Impairments Affecting Function (Mobility)  balance;endurance/activity tolerance;pain;strength  -       User Key  (r) = Recorded By, (t) = Taken By, (c) = Cosigned By    Initials Name Provider Type    Wanda Cassidy, PT Physical Therapist        Mobility     Row Name 03/09/21 0804          Bed Mobility    Bed Mobility  supine-sit;sit-supine  -AC     Supine-Sit [place] (Bed Mobility)  modified independence  -AC     Sit-Supine [place] (Bed Mobility)  modified independence  -     Row Name 03/09/21 0804          Sit-Stand Transfer    Sit-Stand [place] (Transfers)  supervision  -     Row Name 03/09/21 0804          Gait/Stairs (Locomotion)    [place] Level (Gait)  contact guard  -AC     Distance in Feet (Gait)  190'  -AC     Deviations/Abnormal Patterns (Gait)  base of support, narrow;deng decreased;stride length decreased  -AC     Comment (Gait/Stairs)  Decr'd step length and no arm swing demonstrated.  -       User Key  (r) = Recorded By, (t) = Taken By, (c) = Cosigned By    Initials Name Provider Type     Wanda Maldonado, PT Physical Therapist        Obj/Interventions     Row Name 03/09/21 0804          Range of Motion Comprehensive    Comment, General Range of Motion  BLE ROM grossly WNL.  -     Row Name 03/09/21 0804          Strength Comprehensive (MMT)    Comment, General Manual Muscle Testing (MMT) Assessment  BLE MMT grossly 4+/5.  -     Row Name 03/09/21 0804          Balance    Balance Assessment  standing dynamic balance  -     Dynamic Standing Balance  WFL  -     Row Name 03/09/21 0804          Sensory Assessment (Somatosensory)    Sensory Assessment (Somatosensory)  right-sided sensation intact;other (see comments) Decr'd sensation along lateral L foot  -       User Key  (r) =  Recorded By, (t) = Taken By, (c) = Cosigned By    Initials Name Provider Type     Wanda Maldonado, PT Physical Therapist        Goals/Plan     Row Name 03/09/21 0804          Bed Mobility Goal 1 (PT)    Activity/Assistive Device (Bed Mobility Goal 1, PT)  bed mobility activities, all  -AC     [place] Level/Cues Needed (Bed Mobility Goal 1, PT)  independent  -AC     Time Frame (Bed Mobility Goal 1, PT)  short term goal (STG)  -AC     Row Name 03/09/21 0804          Transfer Goal 1 (PT)    Activity/Assistive Device (Transfer Goal 1, PT)  transfers, all  -AC     [place] Level/Cues Needed (Transfer Goal 1, PT)  independent  -AC     Time Frame (Transfer Goal 1, PT)  short term goal (STG)  -AC     Row Name 03/09/21 0804          Gait Training Goal 1 (PT)    Activity/Assistive Device (Gait Training Goal 1, PT)  gait (walking locomotion);increase endurance/gait distance  -AC     [place] Level (Gait Training Goal 1, PT)  modified independence  -AC     Distance (Gait Training Goal 1, PT)  400'  -AC     Time Frame (Gait Training Goal 1, PT)  short term goal (STG)  -AC     Row Name 03/09/21 0804          Stairs Goal 1 (PT)    Activity/Assistive Device (Stairs Goal 1, PT)  ascending stairs;descending stairs  -AC     [place] Level/Cues Needed (Stairs Goal 1, PT)  independent  -AC     Number of Stairs (Stairs Goal 1, PT)  3  -AC     Time Frame (Stairs Goal 1, PT)  short term goal (STG)  -AC       User Key  (r) = Recorded By, (t) = Taken By, (c) = Cosigned By    Initials Name Provider Type    Wanda Cassidy, PT Physical Therapist        Clinical Impression     Row Name 03/09/21 0804          Pain    Additional Documentation  Pain Scale: Numbers Pre/Post-Treatment (Group)  -     Row Name 03/09/21 0804          Pain Scale: Numbers Pre/Post-Treatment    Pretreatment Pain Rating  5/10  -AC     Posttreatment Pain Rating  5/10  -AC     Pain Location  chest  -AC     Pain Intervention(s)   Repositioned;Ambulation/increased activity;Rest  -AC     Row Name 03/09/21 0804          Plan of Care Review    Plan of Care Reviewed With  patient  -AC     Progress  no change  -AC     Outcome Summary  PT eval complete. Pt presents with decreased endurance and strength warranting PT services. Pt performed bed mobility with mod I, transferred with SPV, and ambulated 190' with CGA (1st 25 ft with RW, remaining without AD). Pt limited primarily d/t pain and weakness. Pt reports plans to attend OP rehab but does not wish to return to home environment; encouraged pt to discuss this with CM. Recommend OPPT services at discharge following chemical dependency rehab pending progress.  -AC     Row Name 03/09/21 0804          Therapy Assessment/Plan (PT)    Rehab Potential (PT)  good, to achieve stated therapy goals  -     Criteria for Skilled Interventions Met (PT)  yes  -AC     Row Name 03/09/21 0804          Vital Signs    Pre Systolic BP Rehab  133  -AC     Pre Treatment Diastolic BP  104  -AC     Post Systolic BP Rehab  129  -AC     Post Treatment Diastolic BP  99  -AC     Pre SpO2 (%)  97  -AC     O2 Delivery Pre Treatment  room air  -AC     Post SpO2 (%)  5  -AC     O2 Delivery Post Treatment  room air  -AC     Pre Patient Position  Supine  -AC     Intra Patient Position  Standing  -AC     Post Patient Position  Supine  -AC     Row Name 03/09/21 0804          Positioning and Restraints    Pre-Treatment Position  in bed  -AC     Post Treatment Position  bed  -AC     In Bed  notified nsg;supine;call light within reach;encouraged to call for assist;exit alarm on  -AC       User Key  (r) = Recorded By, (t) = Taken By, (c) = Cosigned By    Initials Name Provider Type    AC Wanda Maldonado, PT Physical Therapist        Outcome Measures     Row Name 03/09/21 0804          How much help from another person do you currently need...    Turning from your back to your side while in flat bed without using bedrails?  4  -AC      Moving from lying on back to sitting on the side of a flat bed without bedrails?  4  -AC     Moving to and from a bed to a chair (including a wheelchair)?  4  -AC     Standing up from a chair using your arms (e.g., wheelchair, bedside chair)?  4  -AC     Climbing 3-5 steps with a railing?  3  -AC     To walk in hospital room?  3  -AC     AM-PAC 6 Clicks Score (PT)  22  -AC     Row Name 03/09/21 0804          Functional Assessment    Outcome Measure Options  AM-PAC 6 Clicks Basic Mobility (PT)  -       User Key  (r) = Recorded By, (t) = Taken By, (c) = Cosigned By    Initials Name Provider Type    AC Wanda Maldonado, PT Physical Therapist        Physical Therapy Education                 Title: PT OT SLP Therapies (Done)     Topic: Physical Therapy (Done)     Point: Mobility training (Done)     Learning Progress Summary           Patient Acceptance, E, VU by  at 3/9/2021 0804    Comment: See flowsheet                   Point: Home exercise program (Done)     Learning Progress Summary           Patient Acceptance, E, VU by  at 3/9/2021 0804    Comment: See flowsheet                   Point: Body mechanics (Done)     Learning Progress Summary           Patient Acceptance, E, VU by  at 3/9/2021 0804    Comment: See flowsheet                   Point: Precautions (Done)     Learning Progress Summary           Patient Acceptance, E, VU by  at 3/9/2021 0804    Comment: See flowsheet                               User Key     Initials Effective Dates Name Provider Type Discipline     08/09/20 -  Wanda Maldonado, PT Physical Therapist PT              PT Recommendation and Plan  Planned Therapy Interventions (PT): balance training, bed mobility training, gait training, home exercise program, postural re-education, patient/family education, neuromuscular re-education, ROM (range of motion), stair training, strengthening, transfer training  Plan of Care Reviewed With: patient  Progress: no change  Outcome  Summary: PT eval complete. Pt presents with decreased endurance and strength warranting PT services. Pt performed bed mobility with mod I, transferred with SPV, and ambulated 190' with CGA (1st 25 ft with RW, remaining without AD). Pt limited primarily d/t pain and weakness. Pt reports plans to attend OP rehab but does not wish to return to home environment; encouraged pt to discuss this with CM. Recommend OPPT services at discharge following chemical dependency rehab pending progress.     Time Calculation:   PT Charges     Row Name 03/09/21 0804             Time Calculation    Start Time  0804  -      PT Received On  03/09/21  -      PT Goal Re-Cert Due Date  03/19/21  -        User Key  (r) = Recorded By, (t) = Taken By, (c) = Cosigned By    Initials Name Provider Type    AC Wanda Maldonado, PT Physical Therapist        Therapy Charges for Today     Code Description Service Date Service Provider Modifiers Qty    71408228014 HC PT EVAL MOD COMPLEXITY 4 3/9/2021 Wanda Maldonado, PT GP 1          PT G-Codes  Outcome Measure Options: AM-PAC 6 Clicks Basic Mobility (PT)  AM-PAC 6 Clicks Score (PT): 22    Wanda Maldonado PT  3/9/2021

## 2021-03-09 NOTE — PROGRESS NOTES
"Continued Stay Note  [facility]     Patient Name: Eusebio Holley  MRN: [id number]  Today's Date: 3/9/2021    Admit Date: 3/7/2021    Discharge Plan     Row Name 03/09/21 1442       Plan    Plan  Home    Plan Comments  We rounded on this patient earlier today- he was sitting up in bed \"waiting for his medication\"  stated \"can we delay this conversation until later, because I really need my medication right now\"  Briefly asked about SABINA/AUD treatment- states \"I don't know yet, I'm not sure\"  This patient is well known to our services this will be our 4th encounter with him since January 2020.    1st Admission: 1/14/20-1/18/20 DX;Pancreatitis, UDS+ BZO, Bupe (illicit), No BAL obtained. Final Disposition: He was linked to [facility] with [facility].  He did not follow-through with this plan.     2nd Admission:   5/22/20-5/29/20 UDS+ Bupe (illicit), BAL= 213 Final Disposition: He again, was linked to [facility] ([address] [place] [address]) after declining treatment for AUD- the patient left AMA.     3rd Admission: 10/20/20-10/26/20 DX: Pancreatitis, UDS+ BZO. Bupe (illicit), opiates. BAL= 32  The patient is still buying Suboxone off the street, despite being linked twice to [facility].  ETOH consumption:  A fifth a day for about a month.  REFUSED ALL SERVICES.    This encounter: (4th) BAL= 42 UDS+METH, Bupe, Opiates  Apparently he again is planning to decline all services.      Row Name 03/09/21 0460       Plan    Plan  Ongoing    Plan Comments  PT        Discharge Codes    No documentation.             Fabi Valdez, RN ,BSN   Addiction Coordinator     "

## 2021-03-11 NOTE — DISCHARGE SUMMARY
[facility] Medicine Services  ELOPEMENT AGAINST MEDICAL ADVICE    Patient Name: Eusebio Holley  : 1990  MRN: [id number]    Date of Admission: 3/7/2021  Date of Elopement:  3/9/2021  Primary Care Physician: Alexis Watters MD    Consults     No orders found from 2021 to 3/8/2021.        Hospital Course     Presenting Problem:   Alcohol-induced acute pancreatitis, unspecified complication status [K85.20]    Active Hospital Problems    Diagnosis  POA   • **Acute alcoholic pancreatitis [K85.20]  Yes   • Erythrocytosis [D75.1]  Yes   • Alcohol-induced acute pancreatitis [K85.20]  Yes   • Alcohol abuse [F10.10]  Yes   • Lactic acidosis [E87.2]  Yes   • Essential hypertension [I10]  Yes   • Suboxone/narcotic abuse, reportedly buys this on the street [F11.10]  Yes      Resolved Hospital Problems   No resolved problems to display.          Hospital Course:  Eusebio Holley is a 30 y.o. male with past medical history significant for pancreatitis, hepatitis, EtOH abuse, Suboxone use, opioid abuse, polysubstance abuse, and hypertension who presents to the ED with complaints of abdominal pain that radiates to his back, decreased appetite, nausea, and vomiting for the past 3 days. He typically drinks a liter of liquor daily. His last drink was approximately two hours prior to coming to the ED.     ETOH Pancreatitis, recurrrent   Lactic Acidosis: resolved  -Continue supportive care with IVF, anti-emetics and pain control  -IV fluids infusing  -Tolerating solid diet, eating everything on history  --No pain on palpation currently, however reports his abdominal pain comes and goes and is 6 on 10 scale.  Intermittent nausea associated with meals but no vomiting  --Insisting on increased pain medication stating Lortab fives are not enough for him.  Agreed to increase Lortab to 7.5 mg p.o. every 6.  No change in his IV morphine at this time.  Continue current Librium dosing.  --added PPI  "for reflux issues     ETOH Abuse; history of withdrawal symptoms   Wishes to quit  -reportedly drinks 1 Liter of liquor daily  -Last drink 3/7 about 11am  -Banana bag x 3 days, then PO   -Scheduled Librium 50 mg every 6 hours  -CIWA protocol with PRN Ativan  -Addiction team following.  Patient known to them.  --Patient is insisting on increased doses of pain pills as this dose \"does not work well\".  Also wanting his morphine increased stating he always gets it more than he is getting.  Also asking nursing regularly for Ativan when he is not scoring is seen to get it.     Etohic hepatitis  - Supportive care; LFTs trending down      Erythrocytosis   -Resolved     Possible UTI  -Cx with no growth, DC'd ABX     Polysubstance abuse  -UDS + for amphetamine, buprenorphine, methamphetamine, opiate, and TCA  -Addiction team following     Tobacco abuse  -Continue Nicotine patch  --Assist/advise     Migraine, acute  - at home he uses imitrex   - supportive care for now   --Resolved    Call from nurse that patient was leaving AMA because he \"medicate himself better at home.  Staff, patient was awake, alert and hemodynamically stable.  He left abruptly.    **Patient left AMA prior to completion of evaluation and management**      Day of Discharge     HPI:   **Patient left AMA prior to completion of evaluation and management**    Vital Signs:         Physical Exam (if applicable):  See physical exam from 9:35 AM today.  No further physical exam done prior to patient leaving AMA  Pending Labs     Order Current Status    Blood Culture - Blood, Arm, Right Preliminary result    Blood Culture - Blood, Arm, Right Preliminary result        Discharge Details     Discharge Disposition:  **Patient left AMA prior to completion of evaluation and management, therefore discharge planning remains incomplete including absence of any needed discharging medications, testing arrangements or follow up unless otherwise specified**    No future " appointments.          Miryam Angelo, APRN  03/11/21

## 2021-03-12 LAB
BACTERIA SPEC AEROBE CULT: NORMAL
BACTERIA SPEC AEROBE CULT: NORMAL

## 2025-05-12 ENCOUNTER — HOSPITAL ENCOUNTER (EMERGENCY)
Facility: HOSPITAL | Age: 35
Discharge: HOME | End: 2025-05-12
Attending: EMERGENCY MEDICINE | Admitting: EMERGENCY MEDICINE
Payer: COMMERCIAL

## 2025-05-12 ENCOUNTER — APPOINTMENT (OUTPATIENT)
Facility: HOSPITAL | Age: 35
End: 2025-05-12
Payer: COMMERCIAL

## 2025-05-12 VITALS
BODY MASS INDEX: 24.35 KG/M2 | RESPIRATION RATE: 14 BRPM | TEMPERATURE: 98.3 F | SYSTOLIC BLOOD PRESSURE: 110 MMHG | HEIGHT: 69 IN | HEART RATE: 77 BPM | DIASTOLIC BLOOD PRESSURE: 71 MMHG | OXYGEN SATURATION: 99 % | WEIGHT: 164.4 LBS

## 2025-05-12 DIAGNOSIS — K40.90 RIGHT INGUINAL HERNIA: Primary | ICD-10-CM

## 2025-05-12 LAB
ALBUMIN SERPL-MCNC: 3.8 G/DL (ref 3.5–5.2)
ALBUMIN/GLOB SERPL: 1.3 G/DL
ALP SERPL-CCNC: 131 U/L (ref 39–117)
ALT SERPL W P-5'-P-CCNC: 17 U/L (ref 1–41)
ANION GAP SERPL CALCULATED.3IONS-SCNC: 11.1 MMOL/L (ref 5–15)
AST SERPL-CCNC: 22 U/L (ref 1–40)
BASOPHILS # BLD AUTO: 0.06 10*3/MM3 (ref 0–0.2)
BASOPHILS NFR BLD AUTO: 0.9 % (ref 0–1.5)
BILIRUB SERPL-MCNC: <0.2 MG/DL (ref 0–1.2)
BUN SERPL-MCNC: 12 MG/DL (ref 6–20)
BUN/CREAT SERPL: 16.7 (ref 7–25)
CALCIUM SPEC-SCNC: 9.2 MG/DL (ref 8.6–10.5)
CHLORIDE SERPL-SCNC: 107 MMOL/L (ref 98–107)
CO2 SERPL-SCNC: 22.9 MMOL/L (ref 22–29)
CREAT SERPL-MCNC: 0.72 MG/DL (ref 0.76–1.27)
D-LACTATE SERPL-SCNC: 1.7 MMOL/L (ref 0.5–2)
DEPRECATED RDW RBC AUTO: 42.7 FL (ref 37–54)
EGFRCR SERPLBLD CKD-EPI 2021: 121.4 ML/MIN/1.73
EOSINOPHIL # BLD AUTO: 0.25 10*3/MM3 (ref 0–0.4)
EOSINOPHIL NFR BLD AUTO: 3.9 % (ref 0.3–6.2)
ERYTHROCYTE [DISTWIDTH] IN BLOOD BY AUTOMATED COUNT: 13.4 % (ref 12.3–15.4)
GLOBULIN UR ELPH-MCNC: 3 GM/DL
GLUCOSE SERPL-MCNC: 103 MG/DL (ref 65–99)
HCT VFR BLD AUTO: 35.6 % (ref 37.5–51)
HGB BLD-MCNC: 11.7 G/DL (ref 13–17.7)
IMM GRANULOCYTES # BLD AUTO: 0.01 10*3/MM3 (ref 0–0.05)
IMM GRANULOCYTES NFR BLD AUTO: 0.2 % (ref 0–0.5)
LYMPHOCYTES # BLD AUTO: 2.8 10*3/MM3 (ref 0.7–3.1)
LYMPHOCYTES NFR BLD AUTO: 43.5 % (ref 19.6–45.3)
MCH RBC QN AUTO: 28.3 PG (ref 26.6–33)
MCHC RBC AUTO-ENTMCNC: 32.9 G/DL (ref 31.5–35.7)
MCV RBC AUTO: 86 FL (ref 79–97)
MONOCYTES # BLD AUTO: 0.51 10*3/MM3 (ref 0.1–0.9)
MONOCYTES NFR BLD AUTO: 7.9 % (ref 5–12)
NEUTROPHILS NFR BLD AUTO: 2.81 10*3/MM3 (ref 1.7–7)
NEUTROPHILS NFR BLD AUTO: 43.6 % (ref 42.7–76)
PLATELET # BLD AUTO: 223 10*3/MM3 (ref 140–450)
PMV BLD AUTO: 9.9 FL (ref 6–12)
POTASSIUM SERPL-SCNC: 3.9 MMOL/L (ref 3.5–5.2)
PROT SERPL-MCNC: 6.8 G/DL (ref 6–8.5)
RBC # BLD AUTO: 4.14 10*6/MM3 (ref 4.14–5.8)
SODIUM SERPL-SCNC: 141 MMOL/L (ref 136–145)
WBC NRBC COR # BLD AUTO: 6.44 10*3/MM3 (ref 3.4–10.8)

## 2025-05-12 PROCEDURE — 74177 CT ABD & PELVIS W/CONTRAST: CPT

## 2025-05-12 PROCEDURE — 25010000002 HYDROMORPHONE 1 MG/ML SOLUTION: Performed by: EMERGENCY MEDICINE

## 2025-05-12 PROCEDURE — 80053 COMPREHEN METABOLIC PANEL: CPT | Performed by: EMERGENCY MEDICINE

## 2025-05-12 PROCEDURE — 25010000002 ONDANSETRON PER 1 MG: Performed by: EMERGENCY MEDICINE

## 2025-05-12 PROCEDURE — 85025 COMPLETE CBC W/AUTO DIFF WBC: CPT | Performed by: EMERGENCY MEDICINE

## 2025-05-12 PROCEDURE — 99285 EMERGENCY DEPT VISIT HI MDM: CPT | Performed by: EMERGENCY MEDICINE

## 2025-05-12 PROCEDURE — 96374 THER/PROPH/DIAG INJ IV PUSH: CPT

## 2025-05-12 PROCEDURE — 83605 ASSAY OF LACTIC ACID: CPT | Performed by: EMERGENCY MEDICINE

## 2025-05-12 PROCEDURE — 96375 TX/PRO/DX INJ NEW DRUG ADDON: CPT

## 2025-05-12 PROCEDURE — 25510000001 IOPAMIDOL 61 % SOLUTION: Performed by: EMERGENCY MEDICINE

## 2025-05-12 RX ORDER — METHADONE HYDROCHLORIDE 10 MG/1
50 TABLET ORAL DAILY
COMMUNITY

## 2025-05-12 RX ORDER — ONDANSETRON 2 MG/ML
4 INJECTION INTRAMUSCULAR; INTRAVENOUS ONCE
Status: COMPLETED | OUTPATIENT
Start: 2025-05-12 | End: 2025-05-12

## 2025-05-12 RX ORDER — IOPAMIDOL 612 MG/ML
85 INJECTION, SOLUTION INTRAVASCULAR
Status: COMPLETED | OUTPATIENT
Start: 2025-05-12 | End: 2025-05-12

## 2025-05-12 RX ADMIN — HYDROMORPHONE HYDROCHLORIDE 1 MG: 1 INJECTION, SOLUTION INTRAMUSCULAR; INTRAVENOUS; SUBCUTANEOUS at 17:25

## 2025-05-12 RX ADMIN — IOPAMIDOL 85 ML: 612 INJECTION, SOLUTION INTRAVENOUS at 17:40

## 2025-05-12 RX ADMIN — ONDANSETRON 4 MG: 2 INJECTION INTRAMUSCULAR; INTRAVENOUS at 17:25

## 2025-05-12 NOTE — FSED PROVIDER NOTE
"Subjective  History of Present Illness:    Presents emergency department with concern for hernia in the right groin.  Has been present for over a month.  The area comes and goes spontaneously.  Has been more full and painful today.  No overlying skin change noted.  No fevers.  No intra-abdominal surgeries reported.  He did have a cyst removed from his abdominal wall when he was in high school.  No other symptoms    Nurses Notes reviewed and agree, including vitals, allergies, social history and prior medical history.     REVIEW OF SYSTEMS: All systems reviewed and not pertinent unless noted.    Past Medical History:   Diagnosis Date    Alcohol abuse     Hypertension     Migraine     Pancreatitis     Schizophrenia        Allergies:    Penicillins      History reviewed. No pertinent surgical history.      Social History     Socioeconomic History    Marital status:    Tobacco Use    Smoking status: Every Day     Current packs/day: 1.00     Types: Cigarettes    Smokeless tobacco: Never   Vaping Use    Vaping status: Never Used   Substance and Sexual Activity    Alcohol use: Yes     Comment: ROUGHLY A FIFTH OF VODKA DAILY     Drug use: Yes     Frequency: 1.0 times per week     Types: Benzodiazepines, Marijuana     Comment: every once in a while    Sexual activity: Defer         History reviewed. No pertinent family history.    Objective  Physical Exam:  /71   Pulse 77   Temp 98.3 °F (36.8 °C) (Oral)   Resp 14   Ht 175.3 cm (69\")   Wt 74.6 kg (164 lb 6.4 oz)   SpO2 99%   BMI 24.28 kg/m²      Physical Exam    Primary Survey    Airway: Patent and protected  Breathing: Symmetric bilaterally  Circulation: Mentating well, responsive        Constitutional: Nontoxic appearance.  Psychological: No abnormalities of mood affect.  Head: Atraumatic  Eyes: Conjunctiva are non-injected. no scleral icterus.  ENT: No obvious congestion or obstruction noted  Neck: No obvious deformity.  ROM appears preserved  Chest: " No deformity noted.  No paradoxical breathing noted  Respiratory: Respiratory effort was normal - no use of accessory respiratory muscles noted.  There is no stridor.  Cardiovascular: Perfusion appears preserved - mentating well RRR no murmurs  Gastrointestinal: Abdomen nondistended.  Soft nontender  Genitourinary: Inguinal hernia noted on the right, no scrotal involvement  Lymphatic: Not examined  Back: Not examined  Musculoskeletal: Musculoskeletal system is grossly intact.  There is no obvious deformity.  Neurological: Face: No Asymmetry.  Gross motor movement is intact in all 4 extremities.  Walks and ambulates without difficulty.  Patient exhibits normal speech.  Skin: No Pallor no obvious bruising.  No obvious rash.      ED Course:      Lab Results (last 24 hours)       Procedure Component Value Units Date/Time    CBC & Differential [[id number]]  (Abnormal) Collected: 05/12/25 1720    Specimen: Blood Updated: 05/12/25 1728    Narrative:      The following orders were created for panel order CBC & Differential.  Procedure                               Abnormality         Status                     ---------                               -----------         ------                     CBC Auto Differential[[id number]]        Abnormal            Final result                 Please view results for these tests on the individual orders.    Comprehensive Metabolic Panel [[id number]]  (Abnormal) Collected: 05/12/25 1720    Specimen: Blood Updated: 05/12/25 1744     Glucose 103 mg/dL      BUN 12 mg/dL      Creatinine 0.72 mg/dL      Sodium 141 mmol/L      Potassium 3.9 mmol/L      Chloride 107 mmol/L      CO2 22.9 mmol/L      Calcium 9.2 mg/dL      Total Protein 6.8 g/dL      Albumin 3.8 g/dL      ALT (SGPT) 17 U/L      AST (SGOT) 22 U/L      Alkaline Phosphatase 131 U/L      Total Bilirubin <0.2 mg/dL      Globulin 3.0 gm/dL      A/G Ratio 1.3 g/dL      BUN/Creatinine Ratio 16.7     Anion Gap 11.1 mmol/L      eGFR 121.4  mL/min/1.73     Narrative:      GFR Categories in Chronic Kidney Disease (CKD)              GFR Category          GFR (mL/min/1.73)    Interpretation  G1                    90 or greater        Normal or high (1)  G2                    60-89                Mild decrease (1)  G3a                   45-59                Mild to moderate decrease  G3b                   30-44                Moderate to severe decrease  G4                    15-29                Severe decrease  G5                    14 or less           Kidney failure    (1)In the absence of evidence of kidney disease, neither GFR category G1 or G2 fulfill the criteria for CKD.    eGFR calculation 2021 CKD-EPI creatinine equation, which does not include race as a factor    Lactic Acid, Plasma [[id number]]  (Normal) Collected: 05/12/25 1720    Specimen: Blood Updated: 05/12/25 1743     Lactate 1.7 mmol/L     CBC Auto Differential [[id number]]  (Abnormal) Collected: 05/12/25 1720    Specimen: Blood Updated: 05/12/25 1728     WBC 6.44 10*3/mm3      RBC 4.14 10*6/mm3      Hemoglobin 11.7 g/dL      Hematocrit 35.6 %      MCV 86.0 fL      MCH 28.3 pg      MCHC 32.9 g/dL      RDW 13.4 %      RDW-SD 42.7 fl      MPV 9.9 fL      Platelets 223 10*3/mm3      Neutrophil % 43.6 %      Lymphocyte % 43.5 %      Monocyte % 7.9 %      Eosinophil % 3.9 %      Basophil % 0.9 %      Immature Grans % 0.2 %      Neutrophils, Absolute 2.81 10*3/mm3      Lymphocytes, Absolute 2.80 10*3/mm3      Monocytes, Absolute 0.51 10*3/mm3      Eosinophils, Absolute 0.25 10*3/mm3      Basophils, Absolute 0.06 10*3/mm3      Immature Grans, Absolute 0.01 10*3/mm3              CT Abdomen Pelvis With Contrast  Result Date: 5/12/2025  CT ABDOMEN PELVIS W CONTRAST Date of Exam: 5/12/2025 5:37 PM EDT Indication: RLQ/groin pain, inguinal hernia now reduced. Comparison: 3/7/2021 and prior Technique: Axial CT images were obtained of the abdomen and pelvis following the uneventful intravenous  administration of 85 mL Isovue-300. Reconstructed coronal and sagittal images were also obtained. Automated exposure control and iterative construction methods were used. FINDINGS: Abdomen/Pelvis: Lower Chest: Dependent atelectasis noted at the lung bases. Loculated fluid collection versus pericardial cystic collection anteriorly and medially at the right lung base noted unchanged from comparison dating back to 2021. No free air is noted below the diaphragm. Organs: The liver, gallbladder, spleen, kidneys and adrenal glands are grossly unremarkable in appearance. Calcification noted in the region of the head of the pancreas. Very mild prominence of the pancreatic duct is noted and some mild atrophy at the proximal body, neck of the pancreas. Findings suggest sequela of chronic pancreatitis. GI/Bowel: The stomach appears grossly unremarkable in appearance. Small bowel demonstrates no evidence of obstruction. No focal wall thickening appreciated. Mild stranding noted within the mesentery inferiorly extending to the right inguinal region. Fat containing inguinal hernia with mild amount of fluid noted. The ileocecal valve is grossly unremarkable in appearance. The appendix is visualized and appears to be within normal limits. There is a moderate stool burden. There is no focal inflammatory change Pelvis: Urinary bladder is unremarkable in appearance. Right inguinal hernia containing fat and a trace amount of fluid noted. No bowel involvement is appreciated. No suspicious pelvic adenopathy or fluid collections Peritoneum/Retroperitoneum: Aorta is normal in caliber. There is no suspicious retroperitoneal adenopathy Bones/Soft Tissues: No destructive bone lesion.     Impression: 1.Right inguinal hernia containing fat and a trace amount of fluid. No bowel involvement is appreciated. 2.No acute intra-abdominal or intrapelvic abnormality appreciated. 3.Other incidental findings as above. Electronically Signed: Asad  MD Thais  5/12/2025 6:13 PM EDT  Workstation ID: [id number]       No orders to display       Procedures    MDM     Amount and/or Complexity of Data Reviewed  Clinical lab tests: reviewed        Initial impression of presenting illness : Vital signs reviewed -nonactionable.  34-year-old with right inguinal hernia.  Has been spontaneously reducible until today.  No overlying skin change to suggest strangulation.    My initial pre-test probability for an emergent process is moderate to high.  He has pain control-ordered hydromorphone.  Will attempt to reduce after pain control.  Will obtain labs and CT scan of the abdomen pelvis to further evaluate hernia.    Initial treatment of presenting symptoms: Symptoms nonactionable - No immediate immediately necessary.    any diagnostic and therapeutic plan was ordered and interpreted by SHIVA Barone MD with emphasis on identifying and treating emergent/urgent morbid conditions likely associated with the differential diagnosis with this type of presentation.    ED Course as of 05/12/25 1825   Mon May 12, 2025   1733 Hernia reduced with pain medication   [PLACIDO]   1824 CT nonactionable.  Labs nonactionable.  Will refer to general surgery. [PLACIDO]      ED Course User Index  [PLACIDO] Dameon Barone MD        Medications   HYDROmorphone (DILAUDID) injection 1 mg (1 mg Intravenous Given 5/12/25 1725)   ondansetron (ZOFRAN) injection 4 mg (4 mg Intravenous Given 5/12/25 1725)   iopamidol (ISOVUE-300) 61 % injection 85 mL (85 mL Intravenous Given 5/12/25 1740)       HEART SCORE   No data recorded           -----  ED Disposition       ED Disposition   Discharge    Condition   Stable    Comment   --             Final diagnoses:   Right inguinal hernia      Your Follow-Up Providers       Schedule an appointment as soon as possible for a visit  with Warren Holland MD.    Specialty: General Surgery  [address] [address]  [phone]                       Contact  information for after-discharge care    Follow-up information has not been specified.                    Your medication list        CONTINUE taking these medications        Instructions Last Dose Given Next Dose Due   buprenorphine-naloxone 8-2 MG per SL tablet  Commonly known as: SUBOXONE      Place 1 tablet under the tongue Daily.       methadone 10 MG tablet  Commonly known as: DOLOPHINE      Take 5 tablets by mouth Daily,

## 2025-07-25 ENCOUNTER — PRE-ADMISSION TESTING (OUTPATIENT)
Dept: PREADMISSION TESTING | Facility: HOSPITAL | Age: 35
End: 2025-07-25
Payer: COMMERCIAL

## 2025-07-25 VITALS — BODY MASS INDEX: 23.74 KG/M2 | WEIGHT: 160.27 LBS | HEIGHT: 69 IN

## 2025-07-25 LAB
ANION GAP SERPL CALCULATED.3IONS-SCNC: 14 MMOL/L (ref 5–15)
BUN SERPL-MCNC: 23.7 MG/DL (ref 6–20)
BUN/CREAT SERPL: 22.6 (ref 7–25)
CALCIUM SPEC-SCNC: 10.3 MG/DL (ref 8.6–10.5)
CHLORIDE SERPL-SCNC: 100 MMOL/L (ref 98–107)
CO2 SERPL-SCNC: 28 MMOL/L (ref 22–29)
CREAT SERPL-MCNC: 1.05 MG/DL (ref 0.76–1.27)
DEPRECATED RDW RBC AUTO: 42.7 FL (ref 37–54)
EGFRCR SERPLBLD CKD-EPI 2021: 94.3 ML/MIN/1.73
ERYTHROCYTE [DISTWIDTH] IN BLOOD BY AUTOMATED COUNT: 13.2 % (ref 12.3–15.4)
GLUCOSE SERPL-MCNC: 110 MG/DL (ref 65–99)
HCT VFR BLD AUTO: 43.5 % (ref 37.5–51)
HGB BLD-MCNC: 14 G/DL (ref 13–17.7)
MCH RBC QN AUTO: 28.6 PG (ref 26.6–33)
MCHC RBC AUTO-ENTMCNC: 32.2 G/DL (ref 31.5–35.7)
MCV RBC AUTO: 89 FL (ref 79–97)
PLATELET # BLD AUTO: 236 10*3/MM3 (ref 140–450)
PMV BLD AUTO: 9.6 FL (ref 6–12)
POTASSIUM SERPL-SCNC: 3.9 MMOL/L (ref 3.5–5.2)
QT INTERVAL: 342 MS
QTC INTERVAL: 452 MS
RBC # BLD AUTO: 4.89 10*6/MM3 (ref 4.14–5.8)
SODIUM SERPL-SCNC: 142 MMOL/L (ref 136–145)
WBC NRBC COR # BLD AUTO: 7.69 10*3/MM3 (ref 3.4–10.8)

## 2025-07-25 PROCEDURE — 93010 ELECTROCARDIOGRAM REPORT: CPT | Performed by: INTERNAL MEDICINE

## 2025-07-25 PROCEDURE — 36415 COLL VENOUS BLD VENIPUNCTURE: CPT

## 2025-07-25 PROCEDURE — 93005 ELECTROCARDIOGRAM TRACING: CPT

## 2025-07-25 PROCEDURE — 80048 BASIC METABOLIC PNL TOTAL CA: CPT

## 2025-07-25 PROCEDURE — 85027 COMPLETE CBC AUTOMATED: CPT

## 2025-07-25 NOTE — PAT
Patient to apply Chlorhexadine wipes  to surgical area (as instructed) the night before procedure and the AM of procedure. Wipes provided.   Ben with Dr. Holland office aware of pt's fentanyl use.

## 2025-07-31 ENCOUNTER — ANESTHESIA EVENT (OUTPATIENT)
Dept: PERIOP | Facility: HOSPITAL | Age: 35
End: 2025-07-31
Payer: COMMERCIAL

## 2025-08-01 ENCOUNTER — ANESTHESIA EVENT CONVERTED (OUTPATIENT)
Dept: ANESTHESIOLOGY | Facility: HOSPITAL | Age: 35
End: 2025-08-01
Payer: COMMERCIAL

## 2025-08-01 ENCOUNTER — HOSPITAL ENCOUNTER (OUTPATIENT)
Facility: HOSPITAL | Age: 35
Setting detail: HOSPITAL OUTPATIENT SURGERY
Discharge: HOME | End: 2025-08-01
Attending: STUDENT IN AN ORGANIZED HEALTH CARE EDUCATION/TRAINING PROGRAM | Admitting: STUDENT IN AN ORGANIZED HEALTH CARE EDUCATION/TRAINING PROGRAM
Payer: COMMERCIAL

## 2025-08-01 ENCOUNTER — ANESTHESIA (OUTPATIENT)
Dept: PERIOP | Facility: HOSPITAL | Age: 35
End: 2025-08-01
Payer: COMMERCIAL

## 2025-08-01 VITALS
TEMPERATURE: 97.4 F | RESPIRATION RATE: 12 BRPM | SYSTOLIC BLOOD PRESSURE: 100 MMHG | OXYGEN SATURATION: 99 % | HEART RATE: 74 BPM | DIASTOLIC BLOOD PRESSURE: 72 MMHG

## 2025-08-01 PROCEDURE — 25010000002 KETOROLAC TROMETHAMINE PER 15 MG

## 2025-08-01 PROCEDURE — 25010000002 METHADONE PER 10 MG: Performed by: NURSE ANESTHETIST, CERTIFIED REGISTERED

## 2025-08-01 PROCEDURE — S0260 H&P FOR SURGERY: HCPCS

## 2025-08-01 PROCEDURE — 25010000002 PROPOFOL 10 MG/ML EMULSION: Performed by: NURSE ANESTHETIST, CERTIFIED REGISTERED

## 2025-08-01 PROCEDURE — 25810000003 LACTATED RINGERS PER 1000 ML: Performed by: ANESTHESIOLOGY

## 2025-08-01 PROCEDURE — C1781 MESH (IMPLANTABLE): HCPCS | Performed by: STUDENT IN AN ORGANIZED HEALTH CARE EDUCATION/TRAINING PROGRAM

## 2025-08-01 PROCEDURE — 25010000002 ONDANSETRON PER 1 MG: Performed by: NURSE ANESTHETIST, CERTIFIED REGISTERED

## 2025-08-01 PROCEDURE — 25010000002 SUGAMMADEX 200 MG/2ML SOLUTION: Performed by: NURSE ANESTHETIST, CERTIFIED REGISTERED

## 2025-08-01 PROCEDURE — 25010000002 MIDAZOLAM PER 1 MG: Performed by: ANESTHESIOLOGY

## 2025-08-01 PROCEDURE — 25010000002 DEXAMETHASONE SODIUM PHOSPHATE 10 MG/ML SOLUTION: Performed by: NURSE ANESTHETIST, CERTIFIED REGISTERED

## 2025-08-01 PROCEDURE — 25010000002 CEFAZOLIN PER 500 MG: Performed by: STUDENT IN AN ORGANIZED HEALTH CARE EDUCATION/TRAINING PROGRAM

## 2025-08-01 PROCEDURE — 25010000002 BUPIVACAINE (PF) 0.25 % SOLUTION: Performed by: NURSE ANESTHETIST, CERTIFIED REGISTERED

## 2025-08-01 PROCEDURE — 25010000002 LIDOCAINE PF 1% 1 % SOLUTION: Performed by: NURSE ANESTHETIST, CERTIFIED REGISTERED

## 2025-08-01 DEVICE — DEV WND/CLS STRATAFIX SPIRALPDS PLS CT 2/0 15CM 26MM VIL: Type: IMPLANTABLE DEVICE | Site: ABDOMEN | Status: FUNCTIONAL

## 2025-08-01 DEVICE — 3DMAX MID ANATOMICAL MESH, 10 CM X 16 CM (4" X 6"), LARGE, RIGHT
Type: IMPLANTABLE DEVICE | Site: ABDOMEN | Status: FUNCTIONAL
Brand: 3DMAX

## 2025-08-01 RX ORDER — METHADONE HYDROCHLORIDE 10 MG/1
30 TABLET ORAL ONCE
Refills: 0 | Status: COMPLETED | OUTPATIENT
Start: 2025-08-01 | End: 2025-08-01

## 2025-08-01 RX ORDER — ROCURONIUM BROMIDE 10 MG/ML
INJECTION, SOLUTION INTRAVENOUS AS NEEDED
Status: DISCONTINUED | OUTPATIENT
Start: 2025-08-01 | End: 2025-08-01 | Stop reason: SURG

## 2025-08-01 RX ORDER — KETOROLAC TROMETHAMINE 30 MG/ML
30 INJECTION, SOLUTION INTRAMUSCULAR; INTRAVENOUS ONCE
Status: COMPLETED | OUTPATIENT
Start: 2025-08-01 | End: 2025-08-01

## 2025-08-01 RX ORDER — METHADONE HYDROCHLORIDE 10 MG/1
10 TABLET ORAL EVERY 4 HOURS PRN
Refills: 0 | Status: ACTIVE | OUTPATIENT
Start: 2025-08-01 | End: 2025-08-01

## 2025-08-01 RX ORDER — ACETAMINOPHEN 325 MG/1
TABLET ORAL
Status: COMPLETED
Start: 2025-08-01 | End: 2025-08-01

## 2025-08-01 RX ORDER — LIDOCAINE HYDROCHLORIDE 10 MG/ML
INJECTION, SOLUTION EPIDURAL; INFILTRATION; INTRACAUDAL; PERINEURAL AS NEEDED
Status: DISCONTINUED | OUTPATIENT
Start: 2025-08-01 | End: 2025-08-01 | Stop reason: SURG

## 2025-08-01 RX ORDER — ACETAMINOPHEN 325 MG/1
650 TABLET ORAL ONCE
Status: COMPLETED | OUTPATIENT
Start: 2025-08-01 | End: 2025-08-01

## 2025-08-01 RX ORDER — FAMOTIDINE 20 MG/1
20 TABLET, FILM COATED ORAL ONCE
Status: COMPLETED | OUTPATIENT
Start: 2025-08-01 | End: 2025-08-01

## 2025-08-01 RX ORDER — METHADONE HYDROCHLORIDE 10 MG/ML
15 INJECTION, SOLUTION INTRAMUSCULAR; INTRAVENOUS; SUBCUTANEOUS 2 TIMES DAILY PRN
Refills: 0 | Status: COMPLETED | OUTPATIENT
Start: 2025-08-01 | End: 2025-08-01

## 2025-08-01 RX ORDER — SODIUM CHLORIDE 0.9 % (FLUSH) 0.9 %
3-10 SYRINGE (ML) INJECTION AS NEEDED
Status: DISCONTINUED | OUTPATIENT
Start: 2025-08-01 | End: 2025-08-01 | Stop reason: HOSPADM

## 2025-08-01 RX ORDER — BUPIVACAINE HYDROCHLORIDE 2.5 MG/ML
INJECTION, SOLUTION EPIDURAL; INFILTRATION; INTRACAUDAL; PERINEURAL
Status: COMPLETED | OUTPATIENT
Start: 2025-08-01 | End: 2025-08-01

## 2025-08-01 RX ORDER — SODIUM CHLORIDE 0.9 % (FLUSH) 0.9 %
10 SYRINGE (ML) INJECTION EVERY 12 HOURS SCHEDULED
Status: DISCONTINUED | OUTPATIENT
Start: 2025-08-01 | End: 2025-08-01 | Stop reason: HOSPADM

## 2025-08-01 RX ORDER — MIDAZOLAM HYDROCHLORIDE 1 MG/ML
1 INJECTION, SOLUTION INTRAMUSCULAR; INTRAVENOUS
Status: COMPLETED | OUTPATIENT
Start: 2025-08-01 | End: 2025-08-01

## 2025-08-01 RX ORDER — NALOXONE HCL 0.4 MG/ML
0.4 VIAL (ML) INJECTION AS NEEDED
Status: DISCONTINUED | OUTPATIENT
Start: 2025-08-01 | End: 2025-08-01 | Stop reason: HOSPADM

## 2025-08-01 RX ORDER — PROMETHAZINE HYDROCHLORIDE 25 MG/1
25 TABLET ORAL ONCE AS NEEDED
Status: DISCONTINUED | OUTPATIENT
Start: 2025-08-01 | End: 2025-08-01 | Stop reason: HOSPADM

## 2025-08-01 RX ORDER — METHADONE HYDROCHLORIDE 10 MG/1
TABLET ORAL
Status: COMPLETED
Start: 2025-08-01 | End: 2025-08-01

## 2025-08-01 RX ORDER — ONDANSETRON 2 MG/ML
4 INJECTION INTRAMUSCULAR; INTRAVENOUS ONCE AS NEEDED
Status: DISCONTINUED | OUTPATIENT
Start: 2025-08-01 | End: 2025-08-01 | Stop reason: HOSPADM

## 2025-08-01 RX ORDER — SODIUM CHLORIDE 0.9 % (FLUSH) 0.9 %
10 SYRINGE (ML) INJECTION AS NEEDED
Status: DISCONTINUED | OUTPATIENT
Start: 2025-08-01 | End: 2025-08-01 | Stop reason: HOSPADM

## 2025-08-01 RX ORDER — KETOROLAC TROMETHAMINE 30 MG/ML
INJECTION, SOLUTION INTRAMUSCULAR; INTRAVENOUS
Status: COMPLETED
Start: 2025-08-01 | End: 2025-08-01

## 2025-08-01 RX ORDER — FAMOTIDINE 10 MG/ML
20 INJECTION, SOLUTION INTRAVENOUS ONCE
Status: DISCONTINUED | OUTPATIENT
Start: 2025-08-01 | End: 2025-08-01 | Stop reason: HOSPADM

## 2025-08-01 RX ORDER — SODIUM CHLORIDE, SODIUM LACTATE, POTASSIUM CHLORIDE, CALCIUM CHLORIDE 600; 310; 30; 20 MG/100ML; MG/100ML; MG/100ML; MG/100ML
9 INJECTION, SOLUTION INTRAVENOUS CONTINUOUS
Status: DISCONTINUED | OUTPATIENT
Start: 2025-08-02 | End: 2025-08-01 | Stop reason: HOSPADM

## 2025-08-01 RX ORDER — HYDRALAZINE HYDROCHLORIDE 20 MG/ML
5 INJECTION INTRAMUSCULAR; INTRAVENOUS
Status: DISCONTINUED | OUTPATIENT
Start: 2025-08-01 | End: 2025-08-01 | Stop reason: HOSPADM

## 2025-08-01 RX ORDER — PROPOFOL 10 MG/ML
VIAL (ML) INTRAVENOUS AS NEEDED
Status: DISCONTINUED | OUTPATIENT
Start: 2025-08-01 | End: 2025-08-01 | Stop reason: SURG

## 2025-08-01 RX ORDER — DEXAMETHASONE SODIUM PHOSPHATE 10 MG/ML
INJECTION, SOLUTION INTRAMUSCULAR; INTRAVENOUS AS NEEDED
Status: DISCONTINUED | OUTPATIENT
Start: 2025-08-01 | End: 2025-08-01 | Stop reason: SURG

## 2025-08-01 RX ORDER — SODIUM CHLORIDE 9 MG/ML
9 INJECTION, SOLUTION INTRAVENOUS AS NEEDED
Status: DISCONTINUED | OUTPATIENT
Start: 2025-08-01 | End: 2025-08-01 | Stop reason: HOSPADM

## 2025-08-01 RX ORDER — LABETALOL HYDROCHLORIDE 5 MG/ML
5 INJECTION, SOLUTION INTRAVENOUS
Status: DISCONTINUED | OUTPATIENT
Start: 2025-08-01 | End: 2025-08-01 | Stop reason: HOSPADM

## 2025-08-01 RX ORDER — DEXAMETHASONE SODIUM PHOSPHATE 10 MG/ML
INJECTION, SOLUTION INTRAMUSCULAR; INTRAVENOUS
Status: COMPLETED | OUTPATIENT
Start: 2025-08-01 | End: 2025-08-01

## 2025-08-01 RX ORDER — ONDANSETRON 2 MG/ML
INJECTION INTRAMUSCULAR; INTRAVENOUS AS NEEDED
Status: DISCONTINUED | OUTPATIENT
Start: 2025-08-01 | End: 2025-08-01 | Stop reason: SURG

## 2025-08-01 RX ORDER — SODIUM CHLORIDE 0.9 % (FLUSH) 0.9 %
3 SYRINGE (ML) INJECTION EVERY 12 HOURS SCHEDULED
Status: DISCONTINUED | OUTPATIENT
Start: 2025-08-01 | End: 2025-08-01 | Stop reason: HOSPADM

## 2025-08-01 RX ORDER — DEXMEDETOMIDINE HYDROCHLORIDE 100 UG/ML
INJECTION, SOLUTION INTRAVENOUS AS NEEDED
Status: DISCONTINUED | OUTPATIENT
Start: 2025-08-01 | End: 2025-08-01 | Stop reason: SURG

## 2025-08-01 RX ORDER — LIDOCAINE HYDROCHLORIDE 10 MG/ML
0.5 INJECTION, SOLUTION EPIDURAL; INFILTRATION; INTRACAUDAL; PERINEURAL ONCE AS NEEDED
Status: DISCONTINUED | OUTPATIENT
Start: 2025-08-01 | End: 2025-08-01 | Stop reason: HOSPADM

## 2025-08-01 RX ORDER — IPRATROPIUM BROMIDE AND ALBUTEROL SULFATE 2.5; .5 MG/3ML; MG/3ML
3 SOLUTION RESPIRATORY (INHALATION) ONCE AS NEEDED
Status: DISCONTINUED | OUTPATIENT
Start: 2025-08-01 | End: 2025-08-01 | Stop reason: HOSPADM

## 2025-08-01 RX ORDER — PROMETHAZINE HYDROCHLORIDE 25 MG/1
25 SUPPOSITORY RECTAL ONCE AS NEEDED
Status: DISCONTINUED | OUTPATIENT
Start: 2025-08-01 | End: 2025-08-01 | Stop reason: HOSPADM

## 2025-08-01 RX ORDER — EPHEDRINE SULFATE 50 MG/ML
INJECTION INTRAVENOUS AS NEEDED
Status: DISCONTINUED | OUTPATIENT
Start: 2025-08-01 | End: 2025-08-01 | Stop reason: SURG

## 2025-08-01 RX ORDER — SODIUM CHLORIDE, SODIUM LACTATE, POTASSIUM CHLORIDE, CALCIUM CHLORIDE 600; 310; 30; 20 MG/100ML; MG/100ML; MG/100ML; MG/100ML
9 INJECTION, SOLUTION INTRAVENOUS CONTINUOUS
Status: DISCONTINUED | OUTPATIENT
Start: 2025-08-01 | End: 2025-08-01 | Stop reason: HOSPADM

## 2025-08-01 RX ADMIN — METHADONE HYDROCHLORIDE 5 MG: 10 INJECTION, SOLUTION INTRAMUSCULAR; INTRAVENOUS; SUBCUTANEOUS at 09:40

## 2025-08-01 RX ADMIN — DEXAMETHASONE SODIUM PHOSPHATE 4 MG: 10 INJECTION INTRAMUSCULAR; INTRAVENOUS at 07:40

## 2025-08-01 RX ADMIN — DEXAMETHASONE SODIUM PHOSPHATE 6 MG: 10 INJECTION, SOLUTION INTRAMUSCULAR; INTRAVENOUS at 08:18

## 2025-08-01 RX ADMIN — METHADONE HYDROCHLORIDE 30 MG: 10 TABLET ORAL at 14:02

## 2025-08-01 RX ADMIN — FAMOTIDINE 20 MG: 20 TABLET, FILM COATED ORAL at 06:55

## 2025-08-01 RX ADMIN — METHADONE HYDROCHLORIDE 30 MG: 10 TABLET ORAL at 12:00

## 2025-08-01 RX ADMIN — SODIUM CHLORIDE, SODIUM LACTATE, POTASSIUM CHLORIDE, CALCIUM CHLORIDE 9 ML/HR: 20; 30; 600; 310 INJECTION, SOLUTION INTRAVENOUS at 06:55

## 2025-08-01 RX ADMIN — METHADONE HYDROCHLORIDE 10 MG: 10 TABLET ORAL at 10:38

## 2025-08-01 RX ADMIN — ROCURONIUM BROMIDE 50 MG: 10 INJECTION INTRAVENOUS at 08:10

## 2025-08-01 RX ADMIN — METHADONE HYDROCHLORIDE 5 MG: 10 INJECTION, SOLUTION INTRAMUSCULAR; INTRAVENOUS; SUBCUTANEOUS at 09:57

## 2025-08-01 RX ADMIN — EPHEDRINE SULFATE 10 MG: 50 INJECTION INTRAVENOUS at 08:34

## 2025-08-01 RX ADMIN — ONDANSETRON 4 MG: 2 INJECTION INTRAMUSCULAR; INTRAVENOUS at 08:18

## 2025-08-01 RX ADMIN — SUGAMMADEX 200 MG: 100 INJECTION, SOLUTION INTRAVENOUS at 09:54

## 2025-08-01 RX ADMIN — METHADONE HYDROCHLORIDE 5 MG: 10 INJECTION, SOLUTION INTRAMUSCULAR; INTRAVENOUS; SUBCUTANEOUS at 09:28

## 2025-08-01 RX ADMIN — PROPOFOL 200 MG: 10 INJECTION, EMULSION INTRAVENOUS at 08:10

## 2025-08-01 RX ADMIN — KETOROLAC TROMETHAMINE 30 MG: 30 INJECTION, SOLUTION INTRAMUSCULAR; INTRAVENOUS at 10:32

## 2025-08-01 RX ADMIN — ACETAMINOPHEN 650 MG: 325 TABLET ORAL at 10:21

## 2025-08-01 RX ADMIN — DEXMEDETOMIDINE HYDROCHLORIDE 8 MCG: 100 INJECTION, SOLUTION INTRAVENOUS at 08:06

## 2025-08-01 RX ADMIN — MIDAZOLAM HYDROCHLORIDE 1 MG: 1 INJECTION, SOLUTION INTRAMUSCULAR; INTRAVENOUS at 06:55

## 2025-08-01 RX ADMIN — METHADONE HYDROCHLORIDE 5 MG: 10 INJECTION, SOLUTION INTRAMUSCULAR; INTRAVENOUS; SUBCUTANEOUS at 09:16

## 2025-08-01 RX ADMIN — MIDAZOLAM HYDROCHLORIDE 1 MG: 1 INJECTION, SOLUTION INTRAMUSCULAR; INTRAVENOUS at 07:05

## 2025-08-01 RX ADMIN — SODIUM CHLORIDE 2000 MG: 900 INJECTION INTRAVENOUS at 08:18

## 2025-08-01 RX ADMIN — BUPIVACAINE HYDROCHLORIDE 60 ML: 2.5 INJECTION, SOLUTION EPIDURAL; INFILTRATION; INTRACAUDAL; PERINEURAL at 08:15

## 2025-08-01 RX ADMIN — LIDOCAINE HYDROCHLORIDE 50 MG: 10 INJECTION, SOLUTION EPIDURAL; INFILTRATION; INTRACAUDAL; PERINEURAL at 08:10

## 2025-08-01 RX ADMIN — KETOROLAC TROMETHAMINE 30 MG: 30 INJECTION INTRAMUSCULAR; INTRAVENOUS at 10:32

## 2025-08-01 RX ADMIN — METHADONE HYDROCHLORIDE 10 MG: 10 INJECTION, SOLUTION INTRAMUSCULAR; INTRAVENOUS; SUBCUTANEOUS at 10:02

## 2025-08-01 NOTE — DISCHARGE INSTRUCTIONS
[facility]:  DISCHARGE INSTRUCTIONS  Dr. Warren Holland    DIET  Okay to resume a regular diet.      PAIN MANAGEMENT  Pain is normal after surgery, but should be able to be managed.     Recommend alternatin mg acetaminophen (Tylenol) every 6 hours*   600-800 mg ibuprofen (Motrin, Advil, etc.) every 6 hours  *Do not take more than 4000 mg of Tylenol in a single day.     If you received a prescription for pain medication after surgery, use this for breakthrough moderate or severe pain if not covered by acetaminophen or ibuprofen.  Okay to use warm and cool compresses.     Bowel Regimen  Prescribed pain medications may cause constipation. Any over-the-counter bowel regimen medications will help with these symptoms.     Recommended regimen:  Miralax 17g in zero sugar Gatorade/Powerade once daily  Senna laxative once to twice daily  Metamucil or other fiber supplement daily  If still constipated - milk of Magnesia or okay to try suppositories or enemas unless directed otherwise by surgeon      DISCHARGE ACTIVITY  Activity is as tolerated.   Okay to walk the night of surgery. Recommend walking at least 4 times daily to prevent complications  No excessive twisting/bending/lifting greater than 20 lbs for 4 weeks.  May return to more strenuous exercise as pain and lifting restrictions allow. Would avoid strenuous activity for at least 1-2 weeks to allow incision to heal.    Driving  You may not drive within 24 hour of receiving narcotic pain medication.   Okay to drive when not taking narcotic pain medication and your incision is not causing limitations with your reaction speed to traffic.    Return to work/school  You may return to work/school in 1-2 weeks with the above restrictions.  You may return to work/school without restrictions in 4 weeks or when your pain/activity allows.    WOUND CARE    Shower/Bathing  You may shower after 24 hours from the surgical procedure.   No tub baths, do not  submerge the incision underwater in a tub bath etc.      Wound Dressing  If dressed with adhesive glue, okay to follow shower/bathing instructions above. Keep site clean and dry.    If dressed with gauze bandages and Steri-Strips. Okay to remove the outer bandage immediately after exiting your first shower and remove the Steri-Strips if still in place after 7 days.    Please call our office, [phone], if you develop increased pain, redness, yellow/purulent discharge, or fevers/chills as this may indicate an infection      FOLLOW-UP  You will be scheduled a follow-up appointment 1-2 weeks after discharge.   [facility] will call you to schedule.    If you do not hear from anyone to schedule, please call [phone] to ask for an appointment 2 weeks from your surgery or discharge date.        CONCERNING SIGNS AND SYMPTOMS  1. Fevers/chills/flu-like symptoms  2. Increasing pain at the surgical site  3. Redness around incisions  4. Purulent drainage from incisions  5. Any other symptoms that are concerning to you    If you develop any of the signs or symptoms above, please call our office ([phone]) or after-hours line (). If unable to reach us, or symptoms are severe, please go to the ER for evaluation.

## 2025-08-01 NOTE — H&P
"Pre-Op H&P  Eusebio Holley  [id number]  1990    Chief complaint: \" I am here for hernia repair on my right side.\"    HPI:    Patient is a 34 y.o.male who presents with a right inguinal hernia.  He presents today for scheduled surgery anticipates a right robotic inguinal hernia repair-RIGHT.  Patient denies take any anticoagulant or antiplatelet medications.  He denies any symptomatic changes or recent illnesses since last office visit.    Review of Systems:  General ROS: negative for chills, fever or skin lesions;  No changes since last office visit.  Neg for recent sick exposure  Cardiovascular ROS: no chest pain or dyspnea on exertion  Respiratory ROS: no cough, shortness of breath, or wheezing    Allergies: Denies allergy to latex or contrast dye.  Allergies   Allergen Reactions    Penicillins Other (See Comments)     Beta lactam allergy details  Antibiotic reaction: unknown  Age at reaction: unknown  Dose to reaction time: unknown  Reason for antibiotic: unknown  Epinephrine required for reaction?: unknown  Tolerated antibiotics: unknown          Home Meds:    No current facility-administered medications on file prior to encounter.     Current Outpatient Medications on File Prior to Encounter   Medication Sig Dispense Refill    methadone (DOLOPHINE) 10 MG tablet Take 12 tablets by mouth Daily,         PMH:   Past Medical History:   Diagnosis Date    Alcohol abuse     Hypertension     IV drug abuse     Migraine     Pancreatitis     Schizophrenia      PSH:  No past surgical history on file.    Immunization History:  Influenza: No  Pneumococcal: No  Tetanus: No    Social History:   Tobacco:   Social History     Tobacco Use   Smoking Status Every Day    Current packs/day: 1.00    Types: Cigarettes   Smokeless Tobacco Never      Alcohol:     Social History     Substance and Sexual Activity   Alcohol Use Not Currently    Comment: scoial drinker currently; alcoholism in past       Vitals:           There " were no vitals taken for this visit.    Physical Exam:  General Appearance:    Alert, cooperative, no distress, appears stated age   Head:    Normocephalic, without obvious abnormality, atraumatic   Lungs:     Clear to auscultation bilaterally, respirations unlabored    Heart:   Regular rate and rhythm, S1 and S2 normal, no murmur, rub    or gallop    Abdomen:    Soft, nontender.  +bowel sounds   Breast Exam:    deferred   Genitalia:    deferred   Extremities:   Extremities normal, atraumatic, no cyanosis or edema   Skin:   Skin color, texture, turgor normal, no rashes or lesions   Neurologic:   Grossly intact   Results Review  LABS:  Lab Results   Component Value Date    WBC 7.69 07/25/2025    HGB 14.0 07/25/2025    HCT 43.5 07/25/2025    MCV 89.0 07/25/2025     07/25/2025    NEUTROABS 2.81 05/12/2025    GLUCOSE 110 (H) 07/25/2025    BUN 23.7 (H) 07/25/2025    CREATININE 1.05 07/25/2025    EGFRIFNONA >150 03/09/2021     07/25/2025    K 3.9 07/25/2025     07/25/2025    CO2 28.0 07/25/2025    MG 2.3 03/09/2021    PHOS 2.6 05/25/2020    CALCIUM 10.3 07/25/2025    ALBUMIN 3.8 05/12/2025    AST 22 05/12/2025    ALT 17 05/12/2025    BILITOT <0.2 05/12/2025    PTT 38.9 (H) 05/24/2020    INR 1.16 10/23/2020       RADIOLOGY:  No radiology results for the last 3 days     I reviewed the patient's new clinical results.    Cancer Staging (if applicable)  Cancer Patient: __ yes __no __unknown; If yes, clinical stage T:__ N:__M:__, stage group or __N/A    Impression: Patient presents with a right inguinal hernia.    Plan: Dr. Holland will perform a right robotic inguinal hernia repair-RIGHT.       Ye Don PA-C   08/01/25   6:47 AM EDT

## 2025-08-01 NOTE — OP NOTE
OPERATIVE NOTE      Patient Name:  Eusebio Holley  YOB: 1990  [id number]     Date of Surgery:  8/1/2025       Pre-op Diagnosis: Right inguinal hernia    Post-op Diagnosis: Right inguinal hernia      Procedure:   Robotic right inguinal hernia repair    Surgeon: Warren Holland MD    Assistant: Assistant: Ye Don PA-C     Anesthesia: General with Block    Estimated Blood Loss: minimal    Complications: None apparent    Implants:    Nothing was implanted during the procedure    Specimen:          None      Findings: Right indirect inguinal hernia    Indications:  Mr. Eusebio Holley is a 34 year old gentleman presenting with a reducible, symptomatic, right inguinal hernia. This was confirmed on clinical exam and CT. He presents today for repair.     Description of Procedure:   After obtaining consent, the patient was brought into the operating table and placed in supine position. SCD boots were placed to bilateral lower extremities. General anesthesia was administered without complication and the patient was intubated without difficulty. The patient’s abdomen was prepped and draped in the usual sterile fashion. Appropriate antibiotic prophylaxis was administered.     After an appropriate surgical pause and time-out was completed, a supraumbilical incision was made. The abdomen was accessed with a Veress needle and the peritoneal cavity insufflated to 15 mmHg after a confirmatory saline drop test and low opening pressure. An 8 mm robotic port was placed using the Optiview technique. Two additional 8 mm ports were placed in the right and left abdomen. The robot was then docked.     An indirect hernia was appreciated on the right side and no hernia was appreciated on the left side.     Attention was turned to the right side. A peritoneal flap was created starting several cm cephalad from the hernia defect and internal ring starting at the medial umbilical ligament. The peritoneal incision was  carried laterally and slightly inferiorly along the medial umbilical ligament. A preperitoneal plane was developed using blunt dissection with careful attention to cauterize small vessels. The epigastric vessels were kept superior. The pubic tubercle and Gonzalo's ligament were identified. The direct hernia sac was reduced. Peritoneum was widely mobilized laterally and tracked to where it was crossing the spermatic cord structures. The spermatic cord was isolated from the peritoneum which was reflected cephalad. There was a moderate-sized indirect hernia. All leading edges were divided. A large 3D MID Max mesh was  inserted to cover the entire myopectineal orifice defect. The mesh was sutured medially to the pubic tubercle and anteriorly and laterally above the iliopubic tract to the anterior abdominal wall using 2-0 Vicryl. The peritoneum was closed over the mesh using a 2-0 Stratafix suture.  There were additional peritoneal defects inferiorly along the inferior border of the mesh secondary to the dissection of the hernia sac. These were closed with a 2-0 Stratafix and the mesh secured inferiorly to the peritoneal edge to prevent it from flipping.    The abdomen was again checked for injuries. The robot was undocked, peritoneal cavity desufflated, and ports were withdrawn. All incisions were closed with 4-0 Monocryl sutures. They were dressed with surgical glue.    All sponge and needle counts were correct times two at the completion of the procedure. The patient recovered from anesthesia, was extubated in the operating room and was transported to the PACU in stable condition.    Assistant: Ye Don PA-C  was responsible for performing the following activities: Suturing, Closing, Placing Dressing, and Held/Positioned Camera and their skilled assistance was necessary for the success of this case.    Warren Holland MD     Date: 8/1/2025  Time: 08:07 EDT

## 2025-08-01 NOTE — ANESTHESIA PROCEDURE NOTES
"Peripheral Block      Patient reassessed immediately prior to procedure    Patient location during procedure: OR  Start time: 8/1/2025 8:15 AM  Stop time: 8/1/2025 8:15 AM  Reason for block: at surgeon's request and post-op pain management  Performed by  CRNA/CAA: Barak Reza CRNA  Preanesthetic Checklist  Completed: patient identified, IV checked, site marked, risks and benefits discussed, surgical consent, monitors and equipment checked, pre-op evaluation and timeout performed  Prep:  Pt Position: supine  Sterile barriers:cap, gloves, mask and washed/disinfected hands  Prep: ChloraPrep  Patient monitoring: blood pressure monitoring, continuous pulse oximetry and EKG  Procedure    Sedation: yes  Performed under: general  Guidance:ultrasound guided  Images:still images obtained, printed/placed on chart    Laterality:Bilateral  Block Type:TAP  Injection Technique:single-shot  Needle Type:short-bevel and echogenic  Needle Gauge:20 G  Resistance on Injection: none    Medications Used: bupivacaine PF (MARCAINE) 0.25 % injection - Injection   60 mL - 8/1/2025 8:15:00 AM  dexamethasone sodium phosphate injection - Injection   4 mg - 8/1/2025 7:40:00 AM      Medications  Comment:Block Injection:  LA dose divided between Right and Left block        Post Assessment  Injection Assessment: negative aspiration for heme, incremental injection and no paresthesia on injection  Patient Tolerance:comfortable throughout block  Complications:no  Additional Notes    Subcostal TAPs    A high-frequency linear transducer, with sterile cover, was placed sub-xiphoid to identify Linea Alba, right and left Rectus Abdominus Muscles (LOU). The transducer was moved either right or left subcostally to identify the LOU and the Transverse Abdominus Muscle (ARREGUIN). The insertion site was prepped in sterile fashion and then localized with 2-5 ml of 1% Lidocaine. Using ultrasound-guidance, a 20-gauge B-Rodríguez 4\" Ultraplex 360 non-stimulating echogenic " "needle was advanced in plane, from medial to lateral, until the tip of the needle was in the fascial plane between the LOU and ARREGUIN. 1-3ml of preservative free normal saline was used to hydro-dissect the fascial planes. After the fascial plane was verified, the local anesthetic (LA) was injected. The procedure was repeated on the opposite side for bilateral coverage. Aspiration every 5 ml to prevent intravascular injection. Injection was completed with negative aspiration of blood and negative intravascular injection. Injection pressures were normal with minimal resistance. The subcostal approach to the TAP nerve block ideally anesthetizes the intercostal nerves T6-T9.     Mid-Axillary/Lateral TAPs    A high-frequency linear transducer, with sterile cover, was placed in the midaxillary line between the ASIS and costal margin. The External Oblique Muscle (EOM), Internal Oblique Muscle (IOM), Transverse Abdominus Muscle (ARREGUIN), and Peritoneum were identified. The insertion site was prepped in sterile fashion and then localized with 2-5 ml of 1% Lidocaine. Using ultrasound-guidance, a 20-gauge B-Rodríguez 4\" Ultraplex 360 non-stimulating echogenic needle was advanced in plane, from medial to lateral, until the tip of the needle was in the fascial plane between the IOM and ARREGUIN. 1-3ml of preservative free normal saline was used to hydro-dissect the fascial planes. After the fascial plane was verified, the local anesthetic (LA) was injected. The procedure was repeated on the opposite side for bilateral coverage. Aspiration every 5 ml to prevent intravascular injection. Injection was completed with negative aspiration of blood and negative intravascular injection. Injection pressures were normal with minimal resistance. Midaxillary TAPs should reach intercostal nerves T10- T11 and the subcostal nerve T12.    Performed by: Jackie Thakkar CRNA            "

## 2025-08-01 NOTE — ANESTHESIA POSTPROCEDURE EVALUATION
Patient: Eusebio Holley    Procedure Summary       Date: 08/01/25 Room / Location:  [facility] [address] [place] [address] [facility]    Anesthesia Start: 0757 Anesthesia Stop: 1014    Procedure: ROBOTIC INGUINAL HERNIA REPAIR - RIGHT (Right: Abdomen) Diagnosis:     Surgeons: Warren Holland MD Provider: Cuong Hogan MD    Anesthesia Type: general ASA Status: 2            Anesthesia Type: general    Vitals  Vitals Value Taken Time   BP 86/60 08/01/25 10:10   Temp 98.5 °F (36.9 °C) 08/01/25 10:07   Pulse 73 08/01/25 10:15   Resp 12 08/01/25 10:15   SpO2 100 % 08/01/25 10:15           Post Anesthesia Care and Evaluation    Patient location during evaluation: PACU  Patient participation: complete - patient participated  Level of consciousness: awake and alert  Pain score: 2  Pain management: adequate    Airway patency: patent  Anesthetic complications: No anesthetic complications  PONV Status: none  Cardiovascular status: hemodynamically stable and acceptable  Respiratory status: nonlabored ventilation, acceptable, nasal cannula and spontaneous ventilation  Hydration status: acceptable    Comments: Pain meds per PACU

## 2025-08-01 NOTE — ANESTHESIA PROCEDURE NOTES
Airway  Reason: elective    Date/Time: 8/1/2025 8:13 AM  Airway not difficult    General Information and Staff    Patient location during procedure: OR  CRNA/CAA: Jackie Thakkar CRNA    Indications and Patient Condition  Indications for airway management: airway protection    Preoxygenated: yes  MILS not maintained throughout    Mask difficulty assessment: 1 - vent by mask    Final Airway Details    Final airway type: endotracheal airway      Successful airway: ETT  Cuffed: yes   Successful intubation technique: video laryngoscopy  Adjuncts used in placement: intubating stylet  Endotracheal tube insertion site: oral  Blade: Armstrong  Blade size: 3  ETT size (mm): 7.5  Cormack-Lehane Classification: grade I - full view of glottis  Placement verified by: chest auscultation and capnometry   Measured from: lips  ETT/EBT  to lips (cm): 21  Number of attempts at approach: 1  Assessment: lips, teeth, and gum same as pre-op and atraumatic intubation    Additional Comments  Negative epigastric sounds, Breath sound equal bilaterally with symmetric chest rise and fall

## 2025-08-01 NOTE — ANESTHESIA PREPROCEDURE EVALUATION
Anesthesia Evaluation     Patient summary reviewed and Nursing notes reviewed   NPO Solid Status: > 8 hours  NPO Liquid Status: > 2 hours           Airway   Mallampati: I  TM distance: >3 FB  Neck ROM: full  No difficulty expected  Dental    (+) edentulous and upper dentures    Pulmonary    (+) a smoker Current, cigarettes,  (-) asthma, shortness of breath, recent URI  Cardiovascular     ECG reviewed    (+) hypertension  (-) past MI, dysrhythmias, angina, cardiac stents    ROS comment: ECG ST     ECHO 2021  EF>55% ow normal    Neuro/Psych  (+) headaches  (-) CVA  GI/Hepatic/Renal/Endo    (-) no renal disease, diabetes, no thyroid disorder    Musculoskeletal     Abdominal    Substance History   (+) alcohol use, drug use     OB/GYN          Other   autoimmune disease (l) ,     ROS/Med Hx Other: Benzodiazepines, Marijuana, Fentanyl;in past  2weeks ago   Methadone 120 daily    Labs wnl                 Anesthesia Plan    ASA 2     general     (Local per surgeon vs TAP)  intravenous induction     Anesthetic plan, risks, benefits, and alternatives have been provided, discussed and informed consent has been obtained with: patient.    CODE STATUS:

## (undated) DEVICE — BLANKT WARM UPPR/BDY ARM/OUT 57X196CM

## (undated) DEVICE — MICRO HVTSA, 0.5G AND HVTSA SOURCEMARK PRODUCT CODE M1206 AND M1206-01: Brand: EXOFIN MICRO HVTSA, 0.5G

## (undated) DEVICE — PK LAP LASR CHOLE 10

## (undated) DEVICE — COLUMN DRAPE

## (undated) DEVICE — LAPAROVUE VISIBILITY SYSTEM LAPAROSCOPIC SOLUTIONS: Brand: LAPAROVUE

## (undated) DEVICE — ST TBG AIRSEAL BIF FLTR W/ACT/CHARCOAL/FLTR

## (undated) DEVICE — SNAP KOVER: Brand: UNBRANDED

## (undated) DEVICE — SEAL

## (undated) DEVICE — 1LYRTR 16FR10ML100%SIL UMS SNP: Brand: MEDLINE INDUSTRIES, INC.

## (undated) DEVICE — SOL ANTISTICK CAUTRY ELECTROLUBE LF

## (undated) DEVICE — DRAPE,TOP,102X53,STERILE: Brand: MEDLINE

## (undated) DEVICE — PATIENT RETURN ELECTRODE, SINGLE-USE, CONTACT QUALITY MONITORING, ADULT, WITH 9FT CORD, FOR PATIENTS WEIGING OVER 33LBS. (15KG): Brand: MEGADYNE

## (undated) DEVICE — DRAPE,UTILITY,TAPE,15X26,STERILE: Brand: MEDLINE

## (undated) DEVICE — BLADELESS OBTURATOR: Brand: WECK VISTA

## (undated) DEVICE — 3M™ IOBAN™ 2 ANTIMICROBIAL INCISE DRAPE 6650EZ: Brand: IOBAN™ 2

## (undated) DEVICE — VIOLET BRAIDED (POLYGLACTIN 910), SYNTHETIC ABSORBABLE SUTURE: Brand: COATED VICRYL

## (undated) DEVICE — UNDRGLV SURG BIOGEL PUNCTUREINDICATION SZ7 PF STRL

## (undated) DEVICE — ARM DRAPE

## (undated) DEVICE — GOWN SURG ORBIS LVL3 2XL STRL

## (undated) DEVICE — GLV SURG BIOGEL LTX PF 7

## (undated) DEVICE — ENDOPATH PNEUMONEEDLE INSUFFLATION NEEDLES WITH LUER LOCK CONNECTORS 120MM: Brand: ENDOPATH

## (undated) DEVICE — TROC BLADLES AIRSEAL/OPTI THRD 8X120MM 1P/U

## (undated) DEVICE — TIP COVER ACCESSORY

## (undated) DEVICE — SUT MNCRYL PLS ANTIB UD 4/0 PS2 18IN

## (undated) DEVICE — SYR LL TP 10ML STRL

## (undated) DEVICE — TRENDELENBURG WINGPAD POSITIONING KIT DELUXE - WITHOUT BODY STRAP: Brand: SOULE MEDICAL